# Patient Record
Sex: FEMALE | Race: WHITE | Employment: OTHER | URBAN - METROPOLITAN AREA
[De-identification: names, ages, dates, MRNs, and addresses within clinical notes are randomized per-mention and may not be internally consistent; named-entity substitution may affect disease eponyms.]

---

## 2017-01-01 ENCOUNTER — HOSPITAL ENCOUNTER (EMERGENCY)
Age: 72
Discharge: HOME | End: 2017-01-01
Attending: EMERGENCY MEDICINE
Payer: MEDICARE

## 2017-01-01 VITALS
TEMPERATURE: 97.7 F | HEIGHT: 63 IN | RESPIRATION RATE: 18 BRPM | BODY MASS INDEX: 33.66 KG/M2 | HEART RATE: 78 BPM | DIASTOLIC BLOOD PRESSURE: 70 MMHG | SYSTOLIC BLOOD PRESSURE: 195 MMHG | OXYGEN SATURATION: 94 % | WEIGHT: 190 LBS

## 2017-01-01 DIAGNOSIS — I10 ESSENTIAL HYPERTENSION: ICD-10-CM

## 2017-01-01 PROCEDURE — 74011250637 HC RX REV CODE- 250/637: Performed by: EMERGENCY MEDICINE

## 2017-01-01 PROCEDURE — 99284 EMERGENCY DEPT VISIT MOD MDM: CPT

## 2017-01-01 RX ORDER — CANDESARTAN CILEXETIL AND HYDROCHLOROTHIAZIDE 32; 12.5 MG/1; MG/1
1 TABLET ORAL DAILY
COMMUNITY
End: 2018-12-07

## 2017-01-01 RX ORDER — AMLODIPINE BESYLATE 5 MG/1
10 TABLET ORAL DAILY
COMMUNITY

## 2017-01-01 RX ORDER — CLONIDINE HYDROCHLORIDE 0.2 MG/1
0.2 TABLET ORAL
COMMUNITY

## 2017-01-01 RX ORDER — GLIMEPIRIDE 4 MG/1
4 TABLET ORAL 2 TIMES DAILY
COMMUNITY
End: 2018-12-07

## 2017-01-01 RX ORDER — CLONIDINE HYDROCHLORIDE 0.1 MG/1
0.2 TABLET ORAL
Status: COMPLETED | OUTPATIENT
Start: 2017-01-01 | End: 2017-01-01

## 2017-01-01 RX ORDER — AMLODIPINE BESYLATE 5 MG/1
5 TABLET ORAL
Status: COMPLETED | OUTPATIENT
Start: 2017-01-01 | End: 2017-01-01

## 2017-01-01 RX ADMIN — CLONIDINE HYDROCHLORIDE 0.2 MG: 0.1 TABLET ORAL at 07:40

## 2017-01-01 RX ADMIN — AMLODIPINE BESYLATE 5 MG: 5 TABLET ORAL at 07:40

## 2017-01-01 NOTE — DISCHARGE INSTRUCTIONS
Learning About High Blood Pressure  What is high blood pressure? Blood pressure is a measure of how hard the blood pushes against the walls of your arteries. It's normal for blood pressure to go up and down throughout the day, but if it stays up, you have high blood pressure. Another name for high blood pressure is hypertension. Two numbers tell you your blood pressure. The first number is the systolic pressure. It shows how hard the blood pushes when your heart is pumping. The second number is the diastolic pressure. It shows how hard the blood pushes between heartbeats, when your heart is relaxed and filling with blood. A blood pressure of less than 120/80 (say \"120 over 80\") is ideal for an adult. High blood pressure is 140/90 or higher. You have high blood pressure if your top number is 140 or higher or your bottom number is 90 or higher, or both. Many people fall into the category in between, called prehypertension. People with prehypertension need to make lifestyle changes to bring their blood pressure down and help prevent or delay high blood pressure. What happens when you have high blood pressure? · Blood flows through your arteries with too much force. Over time, this damages the walls of your arteries. But you can't feel it. High blood pressure usually doesn't cause symptoms. · Fat and calcium start to build up in your arteries. This buildup is called plaque. Plaque makes your arteries narrower and stiffer. Blood can't flow through them as easily. · This lack of good blood flow starts to damage some of the organs in your body. This can lead to problems such as coronary artery disease and heart attack, heart failure, stroke, kidney failure, and eye damage. How can you prevent high blood pressure? · Stay at a healthy weight. · Try to limit how much sodium you eat to less than 2,300 milligrams (mg) a day.  If you limit your sodium to 1,500 mg a day, you can lower your blood pressure even more.  ¨ Buy foods that are labeled \"unsalted,\" \"sodium-free,\" or \"low-sodium. \" Foods labeled \"reduced-sodium\" and \"light sodium\" may still have too much sodium. ¨ Flavor your food with garlic, lemon juice, onion, vinegar, herbs, and spices instead of salt. Do not use soy sauce, steak sauce, onion salt, garlic salt, mustard, or ketchup on your food. ¨ Use less salt (or none) when recipes call for it. You can often use half the salt a recipe calls for without losing flavor. · Be physically active. Get at least 30 minutes of exercise on most days of the week. Walking is a good choice. You also may want to do other activities, such as running, swimming, cycling, or playing tennis or team sports. · Limit alcohol to 2 drinks a day for men and 1 drink a day for women. · Eat plenty of fruits, vegetables, and low-fat dairy products. Eat less saturated and total fats. How is high blood pressure treated? · Your doctor will suggest making lifestyle changes. For example, your doctor may ask you to eat healthy foods, quit smoking, lose extra weight, and be more active. · If lifestyle changes don't help enough or your blood pressure is very high, you will have to take medicine every day. Follow-up care is a key part of your treatment and safety. Be sure to make and go to all appointments, and call your doctor if you are having problems. It's also a good idea to know your test results and keep a list of the medicines you take. Where can you learn more? Go to http://taylor-emanuel.info/. Enter P501 in the search box to learn more about \"Learning About High Blood Pressure. \"  Current as of: March 23, 2016  Content Version: 11.1  © 7076-0957 [employer]. Care instructions adapted under license by [employer] (which disclaims liability or warranty for this information).  If you have questions about a medical condition or this instruction, always ask your healthcare professional. [place], [facility] disclaims any warranty or liability for your use of this information. Low Sodium Diet (2,000 Milligram): Care Instructions  Your Care Instructions  Too much sodium causes your body to hold on to extra water. This can raise your blood pressure and force your heart and kidneys to work harder. In very serious cases, this could cause you to be put in the hospital. It might even be life-threatening. By limiting sodium, you will feel better and lower your risk of serious problems. The most common source of sodium is salt. People get most of the salt in their diet from canned, prepared, and packaged foods. Fast food and restaurant meals also are very high in sodium. Your doctor will probably limit your sodium to less than 2,000 milligrams (mg) a day. This limit counts all the sodium in prepared and packaged foods and any salt you add to your food. Follow-up care is a key part of your treatment and safety. Be sure to make and go to all appointments, and call your doctor if you are having problems. It's also a good idea to know your test results and keep a list of the medicines you take. How can you care for yourself at home? Read food labels  · Read labels on cans and food packages. The labels tell you how much sodium is in each serving. Make sure that you look at the serving size. If you eat more than the serving size, you have eaten more sodium. · Food labels also tell you the Percent Daily Value for sodium. Choose products with low Percent Daily Values for sodium. · Be aware that sodium can come in forms other than salt, including monosodium glutamate (MSG), sodium citrate, and sodium bicarbonate (baking soda). MSG is often added to Asian food. When you eat out, you can sometimes ask for food without MSG or added salt.   Buy low-sodium foods  · Buy foods that are labeled \"unsalted\" (no salt added), \"sodium-free\" (less than 5 mg of sodium per serving), or \"low-sodium\" (less than 140 mg of sodium per serving). Foods labeled \"reduced-sodium\" and \"light sodium\" may still have too much sodium. Be sure to read the label to see how much sodium you are getting. · Buy fresh vegetables, or frozen vegetables without added sauces. Buy low-sodium versions of canned vegetables, soups, and other canned goods. Prepare low-sodium meals  · Cut back on the amount of salt you use in cooking. This will help you adjust to the taste. Do not add salt after cooking. One teaspoon of salt has about 2,300 mg of sodium. · Take the salt shaker off the table. · Flavor your food with garlic, lemon juice, onion, vinegar, herbs, and spices. Do not use soy sauce, lite soy sauce, steak sauce, onion salt, garlic salt, celery salt, mustard, or ketchup on your food. · Use low-sodium salad dressings, sauces, and ketchup. Or make your own salad dressings and sauces without adding salt. · Use less salt (or none) when recipes call for it. You can often use half the salt a recipe calls for without losing flavor. Other foods such as rice, pasta, and grains do not need added salt. · Rinse canned vegetables, and cook them in fresh water. This removes some--but not all--of the salt. · Avoid water that is naturally high in sodium or that has been treated with water softeners, which add sodium. Call your local water company to find out the sodium content of your water supply. If you buy bottled water, read the label and choose a sodium-free brand. Avoid high-sodium foods  · Avoid eating:  ¨ Smoked, cured, salted, and canned meat, fish, and poultry. ¨ Ham, ellis, hot dogs, and luncheon meats. ¨ Regular, hard, and processed cheese and regular peanut butter. ¨ Crackers with salted tops, and other salted snack foods such as pretzels, chips, and salted popcorn. ¨ Frozen prepared meals, unless labeled low-sodium. ¨ Canned and dried soups, broths, and bouillon, unless labeled sodium-free or low-sodium.   ¨ Canned vegetables, unless labeled sodium-free or low-sodium. ¨ Western [place]icki fries, pizza, tacos, and other fast foods. ¨ Pickles, olives, ketchup, and other condiments, especially soy sauce, unless labeled sodium-free or low-sodium. Where can you learn more? Go to http://taylor-emanuel.info/. Enter O479 in the search box to learn more about \"Low Sodium Diet (2,000 Milligram): Care Instructions. \"  Current as of: July 26, 2016  Content Version: 11.1  © 8235-3351 [employer]. Care instructions adapted under license by [employer] (which disclaims liability or warranty for this information). If you have questions about a medical condition or this instruction, always ask your healthcare professional. [address] any warranty or liability for your use of this information.

## 2017-01-01 NOTE — ED PROVIDER NOTES
HPI Comments: 7:12 AM. Kathleen Haro is a 66 y.o. female with a history of DM, HTN, colon polyps, fatty liver, breast calcifications, and chronic kidney disease who presents to the ED c/o hypotension, after checking her BP monitor this morning so didn't take her usual BP meds. The patient mentions that she only took Atenolol yesterday, instead of all her usual BP medications. The patient notes that she normally also takes Atacand one a day, 0.2 mg Clonidine twice a day, and 5 mg Norvasc once a day. The patient denies vomiting or dizziness. There are no other concerns at this time. PCP: Malika Damico MD    The history is provided by the patient. Past Medical History:   Diagnosis Date    Breast calcifications      Left breast     Chronic kidney disease     Colon polyps     Diabetes ([facility] [address]     Fatty liver     Hypertension        Past Surgical History:   Procedure Laterality Date    Hx appendectomy      Endoscopy, colon, diagnostic      Hx hysterectomy  1990    Hx cholecystectomy      Hx breast biopsy Left 9/3/2010     Left breast    Hx breast biopsy       Liver    Hx tonsillectomy           Family History:   Problem Relation Age of Onset    Cancer Sister 71     breast cancer    Breast Cancer Sister 79    Hypertension Mother     Stroke Mother 59    Diabetes Other      grandmother    Colon Cancer Father 67       Social History     Social History    Marital status:      Spouse name: N/A    Number of children: N/A    Years of education: N/A     Occupational History    Not on file. Social History Main Topics    Smoking status: Former Smoker    Smokeless tobacco: Never Used    Alcohol use No    Drug use: No    Sexual activity: Not on file     Other Topics Concern    Not on file     Social History Narrative         ALLERGIES: Ciprofloxacin; Codeine; Penicillins; and Sulfa (sulfonamide antibiotics)    Review of Systems   Constitutional: Negative for fever.    HENT: Negative for sore throat. Eyes: Negative for redness. Respiratory: Negative for shortness of breath and wheezing. Cardiovascular:        Positive for hypotension   Gastrointestinal: Negative for abdominal pain and nausea. Genitourinary: Negative for dysuria. Musculoskeletal: Negative for neck stiffness. Skin: Negative for pallor. Neurological: Negative for headaches. Hematological: Does not bruise/bleed easily. All other systems reviewed and are negative. Vitals:    01/01/17 0630 01/01/17 0700 01/01/17 0730 01/01/17 0815   BP: (!) 202/69 191/62 (!) 212/75 195/70   Pulse:       Resp:       Temp:       SpO2: 94% 94% 93% 94%   Weight:       Height:                Physical Exam   Constitutional: She is oriented to person, place, and time. She appears well-developed and well-nourished. No distress. HENT:   Head: Normocephalic and atraumatic. Mouth/Throat: Oropharynx is clear and moist.   Eyes: Conjunctivae are normal. Pupils are equal, round, and reactive to light. No scleral icterus. Extraocular muscles intact   Neck: Normal range of motion. Neck supple. Cardiovascular: Intact distal pulses. Capillary refill < 3 seconds   Pulmonary/Chest: Effort normal and breath sounds normal. No respiratory distress. She has no wheezes. Abdominal: Soft. Bowel sounds are normal. She exhibits no distension. There is no tenderness. Musculoskeletal: Normal range of motion. She exhibits no edema. Lymphadenopathy:     She has no cervical adenopathy. Neurological: She is alert and oriented to person, place, and time. No cranial nerve deficit. Strength 5/5  No cerebral ataxia   Skin: Skin is warm and dry. She is not diaphoretic. Psychiatric:   No slurred speech   Nursing note and vitals reviewed. MDM  Number of Diagnoses or Management Options  Elevated blood pressure:   Essential hypertension:   Diagnosis management comments: Pt states her bp appeared low at home on he bp cuff.  However, in ED her bp is actually elevated. No ha, dizziness, or blurred vision. Will give her dose catapres, and amlodopine which she didn't take these or her metoprolol. Repeat bp is improving. Will dc home with violeta Pt to take her bp meds as precribed. Has no HA or dizziness. Amount and/or Complexity of Data Reviewed  Tests in the medicine section of CPT®: ordered and reviewed    Risk of Complications, Morbidity, and/or Mortality  Presenting problems: moderate    Patient Progress  Patient progress: improved    ED Course       Procedures    Vitals:  Patient Vitals for the past 12 hrs:   Temp Pulse Resp BP SpO2   01/01/17 0630 - - - (!) 202/69 94 %   01/01/17 0621 - - - (!) 209/87 -   01/01/17 0600 - - - 200/76 94 %   01/01/17 0545 - - - (!) 219/78 95 %   01/01/17 0540 - - - (!) 223/76 96 %   01/01/17 0532 97.7 °F (36.5 °C) 78 18 (!) 215/84 97 %   01/01/17 0527 - - - - 97 %   01/01/17 0524 - - - (!) 215/84 -     97% on RA, indicating adequate oxygenation. EKG interpretation by ED Physician:      Disposition:  Diagnosis:   1. Elevated blood pressure    2. Essential hypertension        Disposition: discharged    Follow-up Information     Follow up With Details Comments Contact Info    Edd Powell MD Call in 2 days  [address] EMERGENCY DEPT  As needed, If symptoms worsen [facility]. [phone]            Patient's Medications   Start Taking    No medications on file   Continue Taking    ALBUTEROL (PROVENTIL HFA) 90 MCG/ACTUATION INHALER    Take  by inhalation. AMLODIPINE (NORVASC) 5 MG TABLET    Take 5 mg by mouth daily. ASPIRIN 81 MG TABLET    Take 81 mg by mouth. ATENOLOL (TENORMIN) 100 MG TABLET    Take 100 mg by mouth daily. ATORVASTATIN (LIPITOR) 20 MG TABLET    Take  by mouth daily. CANDESARTAN-HYDROCHLOROTHIAZIDE (ATACAND HCT) 32-12.5 MG PER TABLET    Take 1 Tab by mouth daily. CANDESARTAN-HYDROCHLOROTHIAZIDE (ATACAND HCT) 32-12.5 MG PER TABLET    Take 1 Tab by mouth daily. CLONIDINE HCL (CATAPRES) 0.2 MG TABLET    Take 0.2 mg by mouth two (2) times a day. DIPHENHYDRAMINE HCL (BENADRYL ALLERGY PO)    Take  by mouth. GLIMEPIRIDE (AMARYL) 4 MG TABLET    Take 4 mg by mouth two (2) times a day. METFORMIN (GLUCOPHAGE) 1,000 MG TABLET    Take 1,000 mg by mouth two (2) times daily (with meals). MILK THISTLE 300 MG CAP    Take  by mouth. These Medications have changed    No medications on file   Stop Taking    No medications on file     Scribe Attestation:   I, Erin Latham, am scribing for and in the presence of Cynthia Langley,  January 01, 2017 at 6:41 AM     Signed by: Poonam Espana, 1/1/17, 6:41 AM    Physician Attestation:   I personally performed the services described in this documentation, reviewed and edited the documentation which was dictated to the scribe in my presence, and it accurately records my words and actions.  Cynthia Langley,   January 01, 2017 at 6:41 AM

## 2017-01-01 NOTE — ED NOTES
Patient states he home monitor gave BP readings of 130/? All day yesterday and this AM. Reports only taking Atenolol yesterday. Concerned about low blood pressure. Denies SOB, denies chest pain. States she has pain in her shoulders which is usual for her.

## 2017-01-24 ENCOUNTER — HOSPITAL ENCOUNTER (EMERGENCY)
Age: 72
Discharge: HOME | End: 2017-01-24
Attending: EMERGENCY MEDICINE
Payer: MEDICARE

## 2017-01-24 VITALS
BODY MASS INDEX: 34.72 KG/M2 | RESPIRATION RATE: 16 BRPM | DIASTOLIC BLOOD PRESSURE: 45 MMHG | SYSTOLIC BLOOD PRESSURE: 146 MMHG | WEIGHT: 196 LBS | OXYGEN SATURATION: 96 % | HEART RATE: 64 BPM | TEMPERATURE: 98 F

## 2017-01-24 LAB
ANION GAP BLD CALC-SCNC: 7 MMOL/L (ref 3–18)
APPEARANCE UR: CLEAR
BASOPHILS # BLD AUTO: 0 K/UL (ref 0–0.06)
BASOPHILS # BLD: 0 % (ref 0–2)
BILIRUB UR QL: NEGATIVE
BUN SERPL-MCNC: 17 MG/DL (ref 7–18)
BUN/CREAT SERPL: 27 (ref 12–20)
CALCIUM SERPL-MCNC: 9.2 MG/DL (ref 8.5–10.1)
CHLORIDE SERPL-SCNC: 100 MMOL/L (ref 100–108)
CK MB CFR SERPL CALC: 2 % (ref 0–4)
CK MB SERPL-MCNC: 0.5 NG/ML (ref 0.5–3.6)
CK SERPL-CCNC: 25 U/L (ref 26–192)
CO2 SERPL-SCNC: 32 MMOL/L (ref 21–32)
COLOR UR: YELLOW
CREAT SERPL-MCNC: 0.63 MG/DL (ref 0.6–1.3)
DIFFERENTIAL METHOD BLD: ABNORMAL
EOSINOPHIL # BLD: 0.2 K/UL (ref 0–0.4)
EOSINOPHIL NFR BLD: 2 % (ref 0–5)
ERYTHROCYTE [DISTWIDTH] IN BLOOD BY AUTOMATED COUNT: 15 % (ref 11.6–14.5)
GLUCOSE SERPL-MCNC: 157 MG/DL (ref 74–99)
GLUCOSE UR STRIP.AUTO-MCNC: NEGATIVE MG/DL
HCT VFR BLD AUTO: 37.5 % (ref 35–45)
HGB BLD-MCNC: 12.5 G/DL (ref 12–16)
HGB UR QL STRIP: NEGATIVE
KETONES UR QL STRIP.AUTO: NEGATIVE MG/DL
LEUKOCYTE ESTERASE UR QL STRIP.AUTO: NEGATIVE
LYMPHOCYTES # BLD AUTO: 17 % (ref 21–52)
LYMPHOCYTES # BLD: 1.3 K/UL (ref 0.9–3.6)
MCH RBC QN AUTO: 26.9 PG (ref 24–34)
MCHC RBC AUTO-ENTMCNC: 33.3 G/DL (ref 31–37)
MCV RBC AUTO: 80.8 FL (ref 74–97)
MONOCYTES # BLD: 0.4 K/UL (ref 0.05–1.2)
MONOCYTES NFR BLD AUTO: 6 % (ref 3–10)
NEUTS SEG # BLD: 5.6 K/UL (ref 1.8–8)
NEUTS SEG NFR BLD AUTO: 75 % (ref 40–73)
NITRITE UR QL STRIP.AUTO: NEGATIVE
PH UR STRIP: 7.5 [PH] (ref 5–8)
PLATELET # BLD AUTO: 225 K/UL (ref 135–420)
PMV BLD AUTO: 10.9 FL (ref 9.2–11.8)
POTASSIUM SERPL-SCNC: 3.9 MMOL/L (ref 3.5–5.5)
PROT UR STRIP-MCNC: NEGATIVE MG/DL
RBC # BLD AUTO: 4.64 M/UL (ref 4.2–5.3)
SODIUM SERPL-SCNC: 139 MMOL/L (ref 136–145)
SP GR UR REFRACTOMETRY: 1.01 (ref 1–1.03)
TROPONIN I SERPL-MCNC: <0.02 NG/ML (ref 0–0.04)
UROBILINOGEN UR QL STRIP.AUTO: 0.2 EU/DL (ref 0.2–1)
WBC # BLD AUTO: 7.6 K/UL (ref 4.6–13.2)

## 2017-01-24 PROCEDURE — 82550 ASSAY OF CK (CPK): CPT | Performed by: EMERGENCY MEDICINE

## 2017-01-24 PROCEDURE — 85025 COMPLETE CBC W/AUTO DIFF WBC: CPT | Performed by: EMERGENCY MEDICINE

## 2017-01-24 PROCEDURE — 80048 BASIC METABOLIC PNL TOTAL CA: CPT | Performed by: EMERGENCY MEDICINE

## 2017-01-24 PROCEDURE — 99284 EMERGENCY DEPT VISIT MOD MDM: CPT

## 2017-01-24 PROCEDURE — 81003 URINALYSIS AUTO W/O SCOPE: CPT | Performed by: EMERGENCY MEDICINE

## 2017-01-24 NOTE — ED TRIAGE NOTES
Seen at [facility] and urgent care over last few days for not feeling well due to elevated BP.  Denies headache

## 2017-01-24 NOTE — ED PROVIDER NOTES
HPI Comments: 1:29 PM Jackie Green is a 66 y.o. female with h/o DM, HTN, and CKD who presents to ED for evaluation of elevated blood pressure. Pt has been to [facility] and [facility]gent Care within the last 2 days about her high blood pressure. Pt was given Labetalol and discharged from [facility]. Her Norvasc dosage was increased by her Urgent Care provider. Pt has been taking her medications yesterday and this morning as prescribed. Pt took her Norvasc and Clonidine 0.2 mg. She says her blood pressure went up to 228/111. Pt was advised to come to the ED by Urgent Care. Pt denies CP, SOB, vomiting, and HA. She states she \"feels sluggish. \" No other concerns or symptoms at this time. PCP: Warner Nelson MD    The history is provided by the patient. Past Medical History:   Diagnosis Date    Breast calcifications      Left breast     Chronic kidney disease     Colon polyps     Diabetes ([facility] [address]     Fatty liver     Hypertension        Past Surgical History:   Procedure Laterality Date    Hx appendectomy      Endoscopy, colon, diagnostic      Hx hysterectomy  1990    Hx cholecystectomy      Hx breast biopsy Left 9/3/2010     Left breast    Hx breast biopsy       Liver    Hx tonsillectomy           Family History:   Problem Relation Age of Onset    Cancer Sister 71     breast cancer    Breast Cancer Sister 79    Hypertension Mother     Stroke Mother 59    Diabetes Other      grandmother    Colon Cancer Father 67       Social History     Social History    Marital status:      Spouse name: N/A    Number of children: N/A    Years of education: N/A     Occupational History    Not on file.      Social History Main Topics    Smoking status: Former Smoker    Smokeless tobacco: Never Used    Alcohol use No    Drug use: No    Sexual activity: Not on file     Other Topics Concern    Not on file     Social History Narrative         ALLERGIES: Ciprofloxacin; Codeine; Penicillins; and Sulfa (sulfonamide antibiotics)    Review of Systems   Constitutional: Positive for fatigue (\"sluggish\"). Negative for chills and fever. HENT: Negative for congestion, rhinorrhea and sore throat. Respiratory: Negative for cough and shortness of breath. Cardiovascular: Negative for chest pain and leg swelling. Gastrointestinal: Negative for abdominal pain, diarrhea, nausea and vomiting. Genitourinary: Negative for dysuria and frequency. Musculoskeletal: Negative for arthralgias, back pain and myalgias. Skin: Negative for rash and wound. Neurological: Negative for dizziness, numbness and headaches. Vitals:    01/24/17 1254 01/24/17 1421   BP: (!) 201/89 159/49   Pulse: 66 62   Resp: 17 14   Temp: 98 °F (36.7 °C)    SpO2: 100% 96%   Weight: 88.9 kg (196 lb)             Physical Exam   Constitutional: She is oriented to person, place, and time. She appears well-developed and well-nourished. No distress. HENT:   Head: Normocephalic and atraumatic. Mouth/Throat: Oropharynx is clear and moist.   Eyes: Conjunctivae and EOM are normal. Pupils are equal, round, and reactive to light. No scleral icterus. Neck: Normal range of motion. Neck supple. Cardiovascular: Normal rate, regular rhythm and normal heart sounds. No murmur heard. Pulmonary/Chest: Effort normal and breath sounds normal. No respiratory distress. Abdominal: Soft. Bowel sounds are normal. She exhibits no distension. There is no tenderness. Musculoskeletal: She exhibits no edema. Lymphadenopathy:     She has no cervical adenopathy. Neurological: She is alert and oriented to person, place, and time. Coordination normal.   Skin: Skin is warm and dry. No rash noted. Psychiatric: She has a normal mood and affect. Her behavior is normal.   Nursing note and vitals reviewed.        MDM  Number of Diagnoses or Management Options  Elevated blood pressure:   Diagnosis management comments: H/o elevated blood pressure seen X 2 in the last 3 days Norvasc increased to 10mg yesterday now improved Bp    Now improved will dc home         Amount and/or Complexity of Data Reviewed  Clinical lab tests: ordered and reviewed      ED Course       Procedures    Vitals:  Patient Vitals for the past 12 hrs:   Temp Pulse Resp BP SpO2   01/24/17 1421 - 62 14 159/49 96 %   01/24/17 1254 98 °F (36.7 °C) 66 17 (!) 201/89 100 %   100% on RA, indicating adequate oxygenation.       Medications ordered:   Medications - No data to display      Lab findings:  Recent Results (from the past 12 hour(s))   URINALYSIS W/ RFLX MICROSCOPIC    Collection Time: 01/24/17  2:08 PM   Result Value Ref Range    Color YELLOW      Appearance CLEAR      Specific gravity 1.014 1.005 - 1.030      pH (UA) 7.5 5.0 - 8.0      Protein NEGATIVE  NEG mg/dL    Glucose NEGATIVE  NEG mg/dL    Ketone NEGATIVE  NEG mg/dL    Bilirubin NEGATIVE  NEG      Blood NEGATIVE  NEG      Urobilinogen 0.2 0.2 - 1.0 EU/dL    Nitrites NEGATIVE  NEG      Leukocyte Esterase NEGATIVE  NEG     METABOLIC PANEL, BASIC    Collection Time: 01/24/17  2:30 PM   Result Value Ref Range    Sodium 139 136 - 145 mmol/L    Potassium 3.9 3.5 - 5.5 mmol/L    Chloride 100 100 - 108 mmol/L    CO2 32 21 - 32 mmol/L    Anion gap 7 3.0 - 18 mmol/L    Glucose 157 (H) 74 - 99 mg/dL    BUN 17 7.0 - 18 MG/DL    Creatinine 0.63 0.6 - 1.3 MG/DL    BUN/Creatinine ratio 27 (H) 12 - 20      GFR est AA >60 >60 ml/min/1.73m2    GFR est non-AA >60 >60 ml/min/1.73m2    Calcium 9.2 8.5 - 10.1 MG/DL   CBC WITH AUTOMATED DIFF    Collection Time: 01/24/17  2:31 PM   Result Value Ref Range    WBC 7.6 4.6 - 13.2 K/uL    RBC 4.64 4.20 - 5.30 M/uL    HGB 12.5 12.0 - 16.0 g/dL    HCT 37.5 35.0 - 45.0 %    MCV 80.8 74.0 - 97.0 FL    MCH 26.9 24.0 - 34.0 PG    MCHC 33.3 31.0 - 37.0 g/dL    RDW 15.0 (H) 11.6 - 14.5 %    PLATELET 095 488 - 767 K/uL    MPV 10.9 9.2 - 11.8 FL    NEUTROPHILS 75 (H) 40 - 73 %    LYMPHOCYTES 17 (L) 21 - 52 %    MONOCYTES 6 3 - 10 % EOSINOPHILS 2 0 - 5 %    BASOPHILS 0 0 - 2 %    ABS. NEUTROPHILS 5.6 1.8 - 8.0 K/UL    ABS. LYMPHOCYTES 1.3 0.9 - 3.6 K/UL    ABS. MONOCYTES 0.4 0.05 - 1.2 K/UL    ABS. EOSINOPHILS 0.2 0.0 - 0.4 K/UL    ABS. BASOPHILS 0.0 0.0 - 0.06 K/UL    DF AUTOMATED     CARDIAC PANEL,(CK, CKMB & TROPONIN)    Collection Time: 01/24/17  2:31 PM   Result Value Ref Range    CK 25 (L) 26 - 192 U/L    CK - MB 0.5 0.5 - 3.6 ng/ml    CK-MB Index 2.0 0.0 - 4.0 %    Troponin-I, Qt. <0.02 0.0 - 0.045 NG/ML         Orders:  Orders Placed This Encounter    METABOLIC PANEL, BASIC     Standing Status:   Standing     Number of Occurrences:   1    URINALYSIS W/ RFLX MICROSCOPIC     Standing Status:   Standing     Number of Occurrences:   1    CBC WITH AUTOMATED DIFF     Standing Status:   Standing     Number of Occurrences:   1    CARDIAC PANEL,(CK, CKMB & TROPONIN)     Standing Status:   Standing     Number of Occurrences:   1       Progress notes, Consult notes, or additional Procedure notes:       Disposition:  Diagnosis:   1. Elevated blood pressure        Disposition:     Follow-up Information     Follow up With Details Comments Contact [facility]  As needed, If symptoms worsen [address] [facility]    Emely Mccoy MD Schedule an appointment as soon as possible for a visit for ED follow up appointment [facility]  [phone]             Patient's Medications   Start Taking    No medications on file   Continue Taking    ALBUTEROL (PROVENTIL HFA) 90 MCG/ACTUATION INHALER    Take  by inhalation. AMLODIPINE (NORVASC) 5 MG TABLET    Take 10 mg by mouth daily. ASPIRIN 81 MG TABLET    Take 81 mg by mouth. ATENOLOL (TENORMIN) 100 MG TABLET    Take 100 mg by mouth daily. ATORVASTATIN (LIPITOR) 20 MG TABLET    Take  by mouth daily. CANDESARTAN-HYDROCHLOROTHIAZIDE (ATACAND HCT) 32-12.5 MG PER TABLET    Take 1 Tab by mouth daily. CANDESARTAN-HYDROCHLOROTHIAZIDE (ATACAND HCT) 32-12.5 MG PER TABLET    Take 1 Tab by mouth daily. CLONIDINE HCL (CATAPRES) 0.2 MG TABLET    Take 0.2 mg by mouth two (2) times a day. DIPHENHYDRAMINE HCL (BENADRYL ALLERGY PO)    Take  by mouth. GLIMEPIRIDE (AMARYL) 4 MG TABLET    Take 4 mg by mouth two (2) times a day. METFORMIN (GLUCOPHAGE) 1,000 MG TABLET    Take 1,000 mg by mouth two (2) times daily (with meals). MILK THISTLE 300 MG CAP    Take  by mouth. These Medications have changed    No medications on file   Stop Taking    No medications on file       [id number] [facility] and in the presence of Jayde Bishop MD (01/24/17)      Physician Attestation  I personally performed the services described in this documentation, reviewed and edited the documentation which was dictated to the scribe in my presence, and it accurately records my words and actions.     Jayde Bishop MD (01/24/17)      Signed by: Rhodia Habermann, Scribe, 01/24/17, 1:33 PM

## 2017-01-24 NOTE — DISCHARGE INSTRUCTIONS
Acute High Blood Pressure: Care Instructions  Your Care Instructions  Acute high blood pressure is very high blood pressure. It's a serious problem. Very high blood pressure can damage your brain, heart, eyes, and kidneys. You may have been given medicines to lower your blood pressure. You may have gotten them as pills or through a needle in one of your veins. This is called an IV. And maybe you were given other medicines too. These can be needed when high blood pressure causes other problems. To keep your blood pressure at a lower level, you may need to make healthy lifestyle changes. And you will probably need to take medicines. Be sure to follow up with your doctor about your blood pressure and what you can do about it. Follow-up care is a key part of your treatment and safety. Be sure to make and go to all appointments, and call your doctor if you are having problems. It's also a good idea to know your test results and keep a list of the medicines you take. How can you care for yourself at home? · See your doctor as often as he or she recommends. This is to make sure your blood pressure is under control. You will probably go at least 2 times a year. But it may be more often at first.  · Take your blood pressure medicine exactly as prescribed. You may take one or more types. They include diuretics, beta-blockers, ACE inhibitors, calcium channel blockers, and angiotensin II receptor blockers. Call your doctor if you think you are having a problem with your medicine. · If you take blood pressure medicine, talk to your doctor before you take decongestants or anti-inflammatory medicine, such as ibuprofen. These can raise blood pressure. · Learn how to check your blood pressure at home. Check it often. · Ask your doctor if it's okay to drink alcohol. · Talk to your doctor about lifestyle changes that can help blood pressure. These include being active and not smoking.   When should you call for help?  Call 911 anytime you think you may need emergency care. This may mean having symptoms that suggest that your blood pressure is causing a serious heart or blood vessel problem. Your blood pressure may be over 180/110. For example, call 911 if:  · You have symptoms of a heart attack. These may include:  ¨ Chest pain or pressure, or a strange feeling in the chest.  ¨ Sweating. ¨ Shortness of breath. ¨ Nausea or vomiting. ¨ Pain, pressure, or a strange feeling in the back, neck, jaw, or upper belly or in one or both shoulders or arms. ¨ Lightheadedness or sudden weakness. ¨ A fast or irregular heartbeat. · You have symptoms of a stroke. These may include:  ¨ Sudden numbness, tingling, weakness, or loss of movement in your face, arm, or leg, especially on only one side of your body. ¨ Sudden vision changes. ¨ Sudden trouble speaking. ¨ Sudden confusion or trouble understanding simple statements. ¨ Sudden problems with walking or balance. ¨ A sudden, severe headache that is different from past headaches. · You have severe back or belly pain. Do not wait until your blood pressure comes down on its own. Get help right away. Call your doctor now or seek immediate care if:  · Your blood pressure is much higher than normal (such as 180/110 or higher), but you don't have symptoms. · You think high blood pressure is causing symptoms, such as:  ¨ Severe headache. ¨ Blurry vision. Watch closely for changes in your health, and be sure to contact your doctor if:  · Your blood pressure measures 140/90 or higher at least 2 times. That means the top number is 140 or higher or the bottom number is 90 or higher, or both. · You think you may be having side effects from your blood pressure medicine. · Your blood pressure is usually normal, but it goes above normal at least 2 times. Where can you learn more? Go to http://taylor-emanuel.info/.   Enter E346 in the search box to learn more about \"Acute High Blood Pressure: Care Instructions. \"  Current as of: August 8, 2016  Content Version: 11.1  © 2881-0883 [employer]. Care instructions adapted under license by [employer] (which disclaims liability or warranty for this information). If you have questions about a medical condition or this instruction, always ask your healthcare professional. [address] any warranty or liability for your use of this information.

## 2017-01-31 ENCOUNTER — HOSPITAL ENCOUNTER (EMERGENCY)
Age: 72
Discharge: HOME | End: 2017-01-31
Attending: EMERGENCY MEDICINE
Payer: MEDICARE

## 2017-01-31 ENCOUNTER — APPOINTMENT (OUTPATIENT)
Dept: GENERAL RADIOLOGY | Age: 72
End: 2017-01-31
Attending: EMERGENCY MEDICINE
Payer: MEDICARE

## 2017-01-31 VITALS
RESPIRATION RATE: 16 BRPM | DIASTOLIC BLOOD PRESSURE: 62 MMHG | HEIGHT: 63 IN | SYSTOLIC BLOOD PRESSURE: 137 MMHG | WEIGHT: 190 LBS | OXYGEN SATURATION: 96 % | BODY MASS INDEX: 33.66 KG/M2 | HEART RATE: 65 BPM | TEMPERATURE: 98 F

## 2017-01-31 DIAGNOSIS — I10 ESSENTIAL HYPERTENSION: Primary | ICD-10-CM

## 2017-01-31 LAB
ANION GAP BLD CALC-SCNC: 22 MMOL/L (ref 10–20)
BUN BLD-MCNC: 18 MG/DL (ref 7–18)
CA-I BLD-MCNC: 1.15 MMOL/L (ref 1.12–1.32)
CHLORIDE BLD-SCNC: 92 MMOL/L (ref 100–108)
CO2 BLD-SCNC: 26 MMOL/L (ref 19–24)
CREAT UR-MCNC: 0.7 MG/DL (ref 0.6–1.3)
GLUCOSE BLD STRIP.AUTO-MCNC: 172 MG/DL (ref 74–106)
HCT VFR BLD CALC: 42 % (ref 36–49)
HGB BLD-MCNC: 14.3 G/DL (ref 12–16)
POTASSIUM BLD-SCNC: 3.5 MMOL/L (ref 3.5–5.5)
SODIUM BLD-SCNC: 136 MMOL/L (ref 136–145)
TROPONIN I BLD-MCNC: <0.04 NG/ML (ref 0–0.08)

## 2017-01-31 PROCEDURE — 71010 XR CHEST PORT: CPT

## 2017-01-31 PROCEDURE — 84484 ASSAY OF TROPONIN QUANT: CPT

## 2017-01-31 PROCEDURE — 99285 EMERGENCY DEPT VISIT HI MDM: CPT

## 2017-01-31 PROCEDURE — 80047 BASIC METABLC PNL IONIZED CA: CPT

## 2017-01-31 PROCEDURE — 93005 ELECTROCARDIOGRAM TRACING: CPT

## 2017-01-31 RX ORDER — LORAZEPAM 1 MG/1
1 TABLET ORAL
Status: DISCONTINUED | OUTPATIENT
Start: 2017-01-31 | End: 2017-01-31

## 2017-01-31 NOTE — DISCHARGE INSTRUCTIONS
Learning About High Blood Pressure  What is high blood pressure? Blood pressure is a measure of how hard the blood pushes against the walls of your arteries. It's normal for blood pressure to go up and down throughout the day, but if it stays up, you have high blood pressure. Another name for high blood pressure is hypertension. Two numbers tell you your blood pressure. The first number is the systolic pressure. It shows how hard the blood pushes when your heart is pumping. The second number is the diastolic pressure. It shows how hard the blood pushes between heartbeats, when your heart is relaxed and filling with blood. A blood pressure of less than 120/80 (say \"120 over 80\") is ideal for an adult. High blood pressure is 140/90 or higher. You have high blood pressure if your top number is 140 or higher or your bottom number is 90 or higher, or both. Many people fall into the category in between, called prehypertension. People with prehypertension need to make lifestyle changes to bring their blood pressure down and help prevent or delay high blood pressure. What happens when you have high blood pressure? · Blood flows through your arteries with too much force. Over time, this damages the walls of your arteries. But you can't feel it. High blood pressure usually doesn't cause symptoms. · Fat and calcium start to build up in your arteries. This buildup is called plaque. Plaque makes your arteries narrower and stiffer. Blood can't flow through them as easily. · This lack of good blood flow starts to damage some of the organs in your body. This can lead to problems such as coronary artery disease and heart attack, heart failure, stroke, kidney failure, and eye damage. How can you prevent high blood pressure? · Stay at a healthy weight. · Try to limit how much sodium you eat to less than 2,300 milligrams (mg) a day.  If you limit your sodium to 1,500 mg a day, you can lower your blood pressure even more.  ¨ Buy foods that are labeled \"unsalted,\" \"sodium-free,\" or \"low-sodium. \" Foods labeled \"reduced-sodium\" and \"light sodium\" may still have too much sodium. ¨ Flavor your food with garlic, lemon juice, onion, vinegar, herbs, and spices instead of salt. Do not use soy sauce, steak sauce, onion salt, garlic salt, mustard, or ketchup on your food. ¨ Use less salt (or none) when recipes call for it. You can often use half the salt a recipe calls for without losing flavor. · Be physically active. Get at least 30 minutes of exercise on most days of the week. Walking is a good choice. You also may want to do other activities, such as running, swimming, cycling, or playing tennis or team sports. · Limit alcohol to 2 drinks a day for men and 1 drink a day for women. · Eat plenty of fruits, vegetables, and low-fat dairy products. Eat less saturated and total fats. How is high blood pressure treated? · Your doctor will suggest making lifestyle changes. For example, your doctor may ask you to eat healthy foods, quit smoking, lose extra weight, and be more active. · If lifestyle changes don't help enough or your blood pressure is very high, you will have to take medicine every day. Follow-up care is a key part of your treatment and safety. Be sure to make and go to all appointments, and call your doctor if you are having problems. It's also a good idea to know your test results and keep a list of the medicines you take. Where can you learn more? Go to http://taylro-emanuel.info/. Enter P501 in the search box to learn more about \"Learning About High Blood Pressure. \"  Current as of: March 23, 2016  Content Version: 11.1  © 3250-5892 [employer]. Care instructions adapted under license by [employer] (which disclaims liability or warranty for this information).  If you have questions about a medical condition or this instruction, always ask your healthcare professional. [employer] disclaims any warranty or liability for your use of this information.

## 2017-01-31 NOTE — ED TRIAGE NOTES
Pt with dizziness that started today with chest tightness, states over last  Weeks has been having medications adjusted. Seen in er twice over 2 weeks. Took 2 clonidine before callign EMS.

## 2017-01-31 NOTE — ED PROVIDER NOTES
HPI Comments: 5:09 PM Lavern To is a 66 y.o. female with a history of HTN, DM,  who presents to the ED c/o dizziness that began earlier today. She states that for the past couple of weeks, she has been having her HTN medications adjusted and has been seen in the ED a couple of times. She states that today, she became anxious, experiencing chest tightness and dizziness, and decided to take 2 Clonidine and 2 Benadryl. She mentions that she has been having increased stress lately. There are no other concerns at this time. Patient is a 66 y.o. female presenting with hypertension and dizziness. The history is provided by the patient. Hypertension    Associated symptoms include dizziness. Dizziness          Past Medical History:   Diagnosis Date    Breast calcifications      Left breast     Chronic kidney disease     Colon polyps     Diabetes [facility] [address]     Fatty liver     Hypertension        Past Surgical History:   Procedure Laterality Date    Hx appendectomy      Endoscopy, colon, diagnostic      Hx hysterectomy  1990    Hx cholecystectomy      Hx breast biopsy Left 9/3/2010     Left breast    Hx breast biopsy       Liver    Hx tonsillectomy           Family History:   Problem Relation Age of Onset    Cancer Sister 71     breast cancer    Breast Cancer Sister 79    Hypertension Mother     Stroke Mother 59    Diabetes Other      grandmother    Colon Cancer Father 67       Social History     Social History    Marital status:      Spouse name: N/A    Number of children: N/A    Years of education: N/A     Occupational History    Not on file.      Social History Main Topics    Smoking status: Former Smoker    Smokeless tobacco: Never Used    Alcohol use No    Drug use: No    Sexual activity: Not on file     Other Topics Concern    Not on file     Social History Narrative         ALLERGIES: Ciprofloxacin; Codeine; Penicillins; and Sulfa (sulfonamide antibiotics)    Review of Systems   Constitutional: Negative. HENT: Negative. Eyes: Negative. Respiratory: Positive for chest tightness. Cardiovascular: Negative. Gastrointestinal: Negative. Endocrine: Negative. Genitourinary: Negative. Musculoskeletal: Negative. Skin: Negative. Allergic/Immunologic: Negative. Neurological: Positive for dizziness. Hematological: Negative. Psychiatric/Behavioral: The patient is nervous/anxious. All other systems reviewed and are negative. Vitals:    01/31/17 1705 01/31/17 1730   BP: 187/67 134/52   Pulse: 73 69   Resp: 12 15   Temp: 98 °F (36.7 °C)    SpO2: 100% 97%   Weight: 86.2 kg (190 lb)    Height: 5' 3\" (1.6 m)    100% on RA, indicating adequate oxygenation. Physical Exam   Constitutional: She is oriented to person, place, and time. She appears well-developed and well-nourished. HENT:   Head: Normocephalic and atraumatic. Eyes: Conjunctivae and EOM are normal. Pupils are equal, round, and reactive to light. Neck: Normal range of motion. Neck supple. Cardiovascular: Normal rate, regular rhythm, normal heart sounds and intact distal pulses. Pulmonary/Chest: Effort normal and breath sounds normal.   Abdominal: Soft. Bowel sounds are normal.   Musculoskeletal: Normal range of motion. Neurological: She is alert and oriented to person, place, and time. Skin: Skin is warm and dry. Psychiatric: She has a normal mood and affect. Her behavior is normal. Judgment and thought content normal.   Nursing note and vitals reviewed. [facility]  ED Course       Procedures      Vitals:  Patient Vitals for the past 12 hrs:   Temp Pulse Resp BP SpO2   01/31/17 1730 - 69 15 134/52 97 %   01/31/17 1705 98 °F (36.7 °C) 73 12 187/67 100 %   100% on RA, indicating adequate oxygenation.        Lab findings:  Recent Results (from the past 12 hour(s))   EKG, 12 LEAD, INITIAL    Collection Time: 01/31/17  5:11 PM   Result Value Ref Range    Ventricular Rate 74 BPM Atrial Rate 74 BPM    P-R Interval 148 ms    QRS Duration 78 ms    Q-T Interval 422 ms    QTC Calculation (Bezet) 468 ms    Calculated P Axis 28 degrees    Calculated R Axis 19 degrees    Calculated T Axis 63 degrees    Diagnosis       Normal sinus rhythm  Normal ECG  When compared with ECG of 16-NOV-2016 14:55,  ST elevation now present in Lateral leads     POC TROPONIN-I    Collection Time: 01/31/17  5:18 PM   Result Value Ref Range    Troponin-I (POC) <0.04 0.00 - 0.08 ng/mL   POC CHEM8    Collection Time: 01/31/17  5:20 PM   Result Value Ref Range    CO2 (POC) 26 (H) 19 - 24 MMOL/L    Glucose (POC) 172 (H) 74 - 106 MG/DL    BUN (POC) 18 7 - 18 MG/DL    Creatinine (POC) 0.7 0.6 - 1.3 MG/DL    GFR-AA (POC) >60 >60 ml/min/1.73m2    GFR, non-AA (POC) >60 >60 ml/min/1.73m2    Sodium (POC) 136 136 - 145 MMOL/L    Potassium (POC) 3.5 3.5 - 5.5 MMOL/L    Calcium, ionized (POC) 1.15 1.12 - 1.32 MMOL/L    Chloride (POC) 92 (L) 100 - 108 MMOL/L    Anion gap (POC) 22 (H) 10 - 20      Hematocrit (POC) 42 36 - 49 %    Hemoglobin (POC) 14.3 12 - 16 G/DL       EKG interpretation by ED Physician:  5:13 PM Normal sinus rhythm at a rate of 74 bpm. ME interval of 148 ms. QRS duration of 78 ms. QTc of 468 ms. R axis 19. X-Ray, CT or other radiology findings or impressions:  XR CHEST PORT    (Results Pending)   5:35 PM There is no acute cardiopulmonary disease noted on the patient's chest x-ray, as interpreted by myself. Progress notes, Consult notes or additional Procedure notes:   5:33 PM Blood pressure is currently 134/52. Reevaluation of patient:   5:50 PM I have reassessed the patient who will be discharged in stable condition. Patient is to return to emergency department if any new or worsening condition. Patient understands and verbalizes agreement with plan. Disposition:  Diagnosis:   1.  Essential hypertension        Disposition:Discharge    Follow-up Information     Follow up With Details Comments Contact Info    Dyana Andrea MD Schedule an appointment as soon as possible for a visit in 2 days  [address] [facility] EMERGENCY DEPT  If symptoms worsen [address]  [phone]            Patient's Medications   Start Taking    No medications on file   Continue Taking    ALBUTEROL (PROVENTIL HFA) 90 MCG/ACTUATION INHALER    Take  by inhalation. AMLODIPINE (NORVASC) 5 MG TABLET    Take 10 mg by mouth daily. ASPIRIN 81 MG TABLET    Take 81 mg by mouth. ATENOLOL (TENORMIN) 100 MG TABLET    Take 100 mg by mouth daily. ATORVASTATIN (LIPITOR) 20 MG TABLET    Take  by mouth daily. CANDESARTAN-HYDROCHLOROTHIAZIDE (ATACAND HCT) 32-12.5 MG PER TABLET    Take 1 Tab by mouth daily. CANDESARTAN-HYDROCHLOROTHIAZIDE (ATACAND HCT) 32-12.5 MG PER TABLET    Take 1 Tab by mouth daily. CLONIDINE HCL (CATAPRES) 0.2 MG TABLET    Take 0.2 mg by mouth two (2) times a day. DIPHENHYDRAMINE HCL (BENADRYL ALLERGY PO)    Take  by mouth. GLIMEPIRIDE (AMARYL) 4 MG TABLET    Take 4 mg by mouth two (2) times a day. METFORMIN (GLUCOPHAGE) 1,000 MG TABLET    Take 1,000 mg by mouth two (2) times daily (with meals). MILK THISTLE 300 MG CAP    Take  by mouth. These Medications have changed    No medications on file   Stop Taking    No medications on file    Scribe Attestation:   Isa Hutchinson acting as a scribe for and in the presence of Dr. Aimee Amador MD January 31, 2017 at 5:06 PM       Signed by: Poonam Song, January 31, 2017 at 5:50 PM       Provider Attestation:   I personally performed the services described in the documentation, reviewed the documentation, as recorded by the scribe in my presence, and it accurately and completely records my words and actions.      Reviewed and signed by:  Dr. Aimee Amador MD

## 2017-01-31 NOTE — ED NOTES
Pt discharged to home, questions answered, denies further questions, verbalized understanding of instructions. Armband removed and shredded. Pt educated regarding pain management and any pain medications prescribed, pt also educated on non pharmacologic pain management.

## 2017-02-01 LAB
ATRIAL RATE: 74 BPM
CALCULATED P AXIS, ECG09: 28 DEGREES
CALCULATED R AXIS, ECG10: 19 DEGREES
CALCULATED T AXIS, ECG11: 63 DEGREES
DIAGNOSIS, 93000: NORMAL
P-R INTERVAL, ECG05: 148 MS
Q-T INTERVAL, ECG07: 422 MS
QRS DURATION, ECG06: 78 MS
QTC CALCULATION (BEZET), ECG08: 468 MS
VENTRICULAR RATE, ECG03: 74 BPM

## 2017-04-04 ENCOUNTER — HOSPITAL ENCOUNTER (OUTPATIENT)
Dept: MAMMOGRAPHY | Age: 72
Discharge: HOME | End: 2017-04-04
Payer: MEDICARE

## 2017-04-04 DIAGNOSIS — Z12.31 VISIT FOR SCREENING MAMMOGRAM: ICD-10-CM

## 2017-04-04 PROCEDURE — 77067 SCR MAMMO BI INCL CAD: CPT

## 2017-04-04 PROCEDURE — 77063 BREAST TOMOSYNTHESIS BI: CPT

## 2017-04-04 PROCEDURE — G0202 SCR MAMMO BI INCL CAD: HCPCS

## 2018-05-23 ENCOUNTER — HOSPITAL ENCOUNTER (OUTPATIENT)
Dept: MAMMOGRAPHY | Age: 73
Discharge: HOME | End: 2018-05-23
Payer: MEDICARE

## 2018-05-23 DIAGNOSIS — Z12.31 VISIT FOR SCREENING MAMMOGRAM: ICD-10-CM

## 2018-05-23 PROCEDURE — 77063 BREAST TOMOSYNTHESIS BI: CPT

## 2018-12-02 ENCOUNTER — APPOINTMENT (OUTPATIENT)
Dept: GENERAL RADIOLOGY | Age: 73
DRG: 310 | End: 2018-12-02
Attending: NURSE PRACTITIONER
Payer: MEDICARE

## 2018-12-02 ENCOUNTER — HOSPITAL ENCOUNTER (INPATIENT)
Age: 73
LOS: 5 days | Discharge: HOME | DRG: 310 | End: 2018-12-07
Attending: EMERGENCY MEDICINE | Admitting: HOSPITALIST
Payer: MEDICARE

## 2018-12-02 DIAGNOSIS — I48.91 ATRIAL FIBRILLATION WITH RVR (HCC): ICD-10-CM

## 2018-12-02 DIAGNOSIS — I48.91 NEW ONSET A-FIB (HCC): Primary | ICD-10-CM

## 2018-12-02 DIAGNOSIS — F41.9 ANXIETY: ICD-10-CM

## 2018-12-02 PROBLEM — E11.9 DM (DIABETES MELLITUS) (HCC): Status: ACTIVE | Noted: 2018-12-02

## 2018-12-02 PROBLEM — I10 HTN (HYPERTENSION): Status: ACTIVE | Noted: 2018-12-02

## 2018-12-02 LAB
ANION GAP SERPL CALC-SCNC: 9 MMOL/L (ref 3–18)
BASOPHILS # BLD: 0 K/UL (ref 0–0.1)
BASOPHILS NFR BLD: 0 % (ref 0–2)
BUN SERPL-MCNC: 19 MG/DL (ref 7–18)
BUN/CREAT SERPL: 22 (ref 12–20)
CALCIUM SERPL-MCNC: 8.8 MG/DL (ref 8.5–10.1)
CHLORIDE SERPL-SCNC: 103 MMOL/L (ref 100–108)
CK MB CFR SERPL CALC: 2.1 % (ref 0–4)
CK MB SERPL-MCNC: 1.2 NG/ML (ref 5–25)
CK SERPL-CCNC: 56 U/L (ref 26–192)
CO2 SERPL-SCNC: 28 MMOL/L (ref 21–32)
CREAT SERPL-MCNC: 0.86 MG/DL (ref 0.6–1.3)
DIFFERENTIAL METHOD BLD: ABNORMAL
EOSINOPHIL # BLD: 0.1 K/UL (ref 0–0.4)
EOSINOPHIL NFR BLD: 1 % (ref 0–5)
ERYTHROCYTE [DISTWIDTH] IN BLOOD BY AUTOMATED COUNT: 14.8 % (ref 11.6–14.5)
EST. AVERAGE GLUCOSE BLD GHB EST-MCNC: 246 MG/DL
GLUCOSE BLD STRIP.AUTO-MCNC: 221 MG/DL (ref 70–110)
GLUCOSE SERPL-MCNC: 240 MG/DL (ref 74–99)
HBA1C MFR BLD: 10.2 % (ref 4.2–5.6)
HCT VFR BLD AUTO: 38 % (ref 35–45)
HGB BLD-MCNC: 12.2 G/DL (ref 12–16)
LYMPHOCYTES # BLD: 3 K/UL (ref 0.9–3.6)
LYMPHOCYTES NFR BLD: 34 % (ref 21–52)
MCH RBC QN AUTO: 26 PG (ref 24–34)
MCHC RBC AUTO-ENTMCNC: 32.1 G/DL (ref 31–37)
MCV RBC AUTO: 81 FL (ref 74–97)
MONOCYTES # BLD: 0.7 K/UL (ref 0.05–1.2)
MONOCYTES NFR BLD: 8 % (ref 3–10)
NEUTS SEG # BLD: 5.1 K/UL (ref 1.8–8)
NEUTS SEG NFR BLD: 57 % (ref 40–73)
PLATELET # BLD AUTO: 285 K/UL (ref 135–420)
PMV BLD AUTO: 10.9 FL (ref 9.2–11.8)
POTASSIUM SERPL-SCNC: 3.9 MMOL/L (ref 3.5–5.5)
RBC # BLD AUTO: 4.69 M/UL (ref 4.2–5.3)
SODIUM SERPL-SCNC: 140 MMOL/L (ref 136–145)
TROPONIN I SERPL-MCNC: <0.02 NG/ML (ref 0–0.04)
WBC # BLD AUTO: 9 K/UL (ref 4.6–13.2)

## 2018-12-02 PROCEDURE — 74011000258 HC RX REV CODE- 258: Performed by: NURSE PRACTITIONER

## 2018-12-02 PROCEDURE — 80048 BASIC METABOLIC PNL TOTAL CA: CPT

## 2018-12-02 PROCEDURE — 96376 TX/PRO/DX INJ SAME DRUG ADON: CPT

## 2018-12-02 PROCEDURE — 93005 ELECTROCARDIOGRAM TRACING: CPT

## 2018-12-02 PROCEDURE — 82962 GLUCOSE BLOOD TEST: CPT

## 2018-12-02 PROCEDURE — 96365 THER/PROPH/DIAG IV INF INIT: CPT

## 2018-12-02 PROCEDURE — 74011250637 HC RX REV CODE- 250/637: Performed by: HOSPITALIST

## 2018-12-02 PROCEDURE — 83036 HEMOGLOBIN GLYCOSYLATED A1C: CPT

## 2018-12-02 PROCEDURE — 65660000000 HC RM CCU STEPDOWN

## 2018-12-02 PROCEDURE — 99285 EMERGENCY DEPT VISIT HI MDM: CPT

## 2018-12-02 PROCEDURE — 74011000250 HC RX REV CODE- 250: Performed by: NURSE PRACTITIONER

## 2018-12-02 PROCEDURE — 71045 X-RAY EXAM CHEST 1 VIEW: CPT

## 2018-12-02 PROCEDURE — 82553 CREATINE MB FRACTION: CPT

## 2018-12-02 PROCEDURE — 85025 COMPLETE CBC W/AUTO DIFF WBC: CPT

## 2018-12-02 PROCEDURE — 74011636637 HC RX REV CODE- 636/637: Performed by: HOSPITALIST

## 2018-12-02 RX ORDER — ASPIRIN 81 MG/1
81 TABLET ORAL DAILY
Status: DISCONTINUED | OUTPATIENT
Start: 2018-12-03 | End: 2018-12-05

## 2018-12-02 RX ORDER — DEXTROSE MONOHYDRATE 25 G/50ML
25-50 INJECTION, SOLUTION INTRAVENOUS AS NEEDED
Status: DISCONTINUED | OUTPATIENT
Start: 2018-12-02 | End: 2018-12-08 | Stop reason: HOSPADM

## 2018-12-02 RX ORDER — INSULIN LISPRO 100 [IU]/ML
INJECTION, SOLUTION INTRAVENOUS; SUBCUTANEOUS
Status: DISCONTINUED | OUTPATIENT
Start: 2018-12-02 | End: 2018-12-08 | Stop reason: HOSPADM

## 2018-12-02 RX ORDER — ACETAMINOPHEN 325 MG/1
650 TABLET ORAL
Status: DISCONTINUED | OUTPATIENT
Start: 2018-12-02 | End: 2018-12-08 | Stop reason: HOSPADM

## 2018-12-02 RX ORDER — METOPROLOL TARTRATE 25 MG/1
25 TABLET, FILM COATED ORAL 2 TIMES DAILY
Status: DISCONTINUED | OUTPATIENT
Start: 2018-12-02 | End: 2018-12-03

## 2018-12-02 RX ORDER — ONDANSETRON 4 MG/1
4 TABLET, ORALLY DISINTEGRATING ORAL
Status: DISCONTINUED | OUTPATIENT
Start: 2018-12-02 | End: 2018-12-08 | Stop reason: HOSPADM

## 2018-12-02 RX ORDER — LORAZEPAM 0.5 MG/1
0.5 TABLET ORAL 3 TIMES DAILY
COMMUNITY
End: 2018-12-07

## 2018-12-02 RX ORDER — ATORVASTATIN CALCIUM 20 MG/1
20 TABLET, FILM COATED ORAL DAILY
Status: DISCONTINUED | OUTPATIENT
Start: 2018-12-03 | End: 2018-12-08 | Stop reason: HOSPADM

## 2018-12-02 RX ORDER — MAGNESIUM SULFATE 100 %
4 CRYSTALS MISCELLANEOUS AS NEEDED
Status: DISCONTINUED | OUTPATIENT
Start: 2018-12-02 | End: 2018-12-08 | Stop reason: HOSPADM

## 2018-12-02 RX ORDER — DILTIAZEM HYDROCHLORIDE 5 MG/ML
20 INJECTION INTRAVENOUS
Status: COMPLETED | OUTPATIENT
Start: 2018-12-02 | End: 2018-12-02

## 2018-12-02 RX ADMIN — DILTIAZEM HYDROCHLORIDE 10 MG/HR: 5 INJECTION INTRAVENOUS at 20:33

## 2018-12-02 RX ADMIN — DILTIAZEM HYDROCHLORIDE 20 MG: 5 INJECTION INTRAVENOUS at 15:30

## 2018-12-02 RX ADMIN — DILTIAZEM HYDROCHLORIDE 7.5 MG/HR: 5 INJECTION INTRAVENOUS at 19:57

## 2018-12-02 RX ADMIN — APIXABAN 5 MG: 5 TABLET, FILM COATED ORAL at 21:43

## 2018-12-02 RX ADMIN — METOPROLOL TARTRATE 25 MG: 25 TABLET ORAL at 22:04

## 2018-12-02 RX ADMIN — INSULIN LISPRO 4 UNITS: 100 INJECTION, SOLUTION INTRAVENOUS; SUBCUTANEOUS at 21:43

## 2018-12-02 RX ADMIN — DILTIAZEM HYDROCHLORIDE 2.5 MG/HR: 5 INJECTION INTRAVENOUS at 18:52

## 2018-12-02 NOTE — ED PROVIDER NOTES
Rafaela Morales is a 74year old female who presents to the ED with a c/o heart palpitations. Pt states it started this morning after having a bad dream that awoke her. 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 She was sent here to the ED by them after they called [facility] for atrial fibrillation by EKG. Pt denies pain. States this has never happened before. Admits only to HTN, and is complaint with her HTN meds. Past Medical History:  
Diagnosis Date  Breast calcifications Left breast   
 Chronic kidney disease  Colon polyps  Diabetes ([facility] [address]  Fatty liver  Hypertension  Menopause Past Surgical History:  
Procedure Laterality Date  ENDOSCOPY, COLON, DIAGNOSTIC    
 HX APPENDECTOMY  HX BREAST BIOPSY Left 9/3/2010 Left breast  
 HX BREAST BIOPSY Liver  HX CHOLECYSTECTOMY  HX HYSTERECTOMY  1990  
 HX TONSILLECTOMY Family History:  
Problem Relation Age of Onset  Cancer Sister 71  
     breast cancer  Breast Cancer Sister 79  Hypertension Mother  Stroke Mother 59  Diabetes Other   
     grandmother  Colon Cancer Father 67 Social History Socioeconomic History  Marital status:  Spouse name: Not on file  Number of children: Not on file  Years of education: Not on file  Highest education level: Not on file Social Needs  Financial resource strain: Not on file  Food insecurity - worry: Not on file  Food insecurity - inability: Not on file  Transportation needs - medical: Not on file  Transportation needs - non-medical: Not on file Occupational History  Not on file Tobacco Use  Smoking status: Former Smoker  Smokeless tobacco: Never Used Substance and Sexual Activity  Alcohol use: No  
 Drug use:  No  
  Sexual activity: Not on file Other Topics Concern  Not on file Social History Narrative  Not on file ALLERGIES: Ciprofloxacin; Codeine; Penicillins; and Sulfa (sulfonamide antibiotics) Review of Systems Constitutional: Negative. HENT: Negative. Eyes: Negative. Respiratory: Negative. Cardiovascular: Negative. Gastrointestinal: Negative. Genitourinary: Negative. Musculoskeletal: Negative. Skin: Negative. Neurological: Negative. Psychiatric/Behavioral: Negative. Vitals:  
 12/02/18 1523 12/02/18 1525 12/02/18 1530 12/02/18 1545 BP:   103/60 125/64 Pulse:   80 75 Resp:   17 19 Temp: 98.2 °F (36.8 °C) SpO2:   96% 95% Weight:  90.7 kg (200 lb) Height:  5' 3\" (1.6 m) Physical Exam  
Constitutional: She appears well-developed and well-nourished. No distress. HENT:  
Head: Normocephalic and atraumatic. Nose: Nose normal.  
Eyes: EOM are normal. Right eye exhibits no discharge. Left eye exhibits no discharge. No scleral icterus. Neck: Normal range of motion. Neck supple. No tracheal deviation present. Cardiovascular: Normal rate, regular rhythm and intact distal pulses. Pulmonary/Chest: Effort normal and breath sounds normal.  
Abdominal: Soft. She exhibits no distension. Genitourinary:  
Genitourinary Comments: NE 
  
Musculoskeletal: Normal range of motion. She exhibits edema (bilateral pedal edema. ). She exhibits no deformity. Neurological: She is alert. Coordination normal.  
Skin: Skin is warm and dry. NE. Psychiatric: She has a normal mood and affect. Her behavior is normal.  
Nursing note and vitals reviewed. MDM Number of Diagnoses or Management Options Atrial fibrillation with RVR ([facility] [address]):  
New onset a-fib [facility]):  
Diagnosis management comments: PROGRESS NOTE:  The CXR was reviewed. No acute findings. No central congestion noted.   Marcio Schaefer NP  4:22 PM 
 
 PROGRESS NOTE:  Called LIZETT Almanzar from cardiology, and advised her that the Pt slipped back into an uncontrolled rate and that we are admitting her. She will round on the Pt in the morning. Carole Cobos NP  6:10 PM 
 
CONSULT:  Spoke with Dr Greta Law, who has accepted the Pt for admission. Carole Cobos NP  6:20 PM 
 
 
 
  
Amount and/or Complexity of Data Reviewed Clinical lab tests: ordered and reviewed Tests in the radiology section of CPT®: ordered and reviewed Discuss the patient with other providers: yes (Abdiel Villafana PA-C) Independent visualization of images, tracings, or specimens: yes (Plain film XR) Risk of Complications, Morbidity, and/or Mortality Presenting problems: high Diagnostic procedures: high Management options: high Patient Progress Patient progress: stable 5:27 PM: Consult:  Discussed care with Davian Powell PA-C (Cardiology) Standard discussion; including history of patients chief complaint, available diagnostic results, and treatment course. She says as long as patient's rate is controlled and she is nonsymptomatic, she can follow up by calling them tomorrow morning and making an appointment. ED Course as of Dec 02 1615 Alexus Mancillar Dec 02, 2018  
1530 I was personally available for consultation in the emergency department. Gabriela Pond MD   [BT] 1609 Pt with rate much improved after medications   [BT] ED Course User Index [BT] Caren Cisneros MD  
 
 
Procedures Diagnosis: 1. New onset a-fib ([address] 2. Atrial fibrillation with RVR ([address] Disposition:    Admitted - Dr Greta Law Follow-up Information Follow up With Specialties Details Why Contact Info Mercedes Reynolds MD Internal Medicine   [address] [facility] [address] [phone] Félix Engel MD Cardiology, Internal Medicine Call in the [address] [facility] fo ran appointment. [address] [facility] [address] [facility] [place] [facility] [address] [phone] Medication List  
  
START taking these medications   
apixaban 5 mg (74 tabs) starter pack Commonly known as:  Olivia Ag Take 10 mg (two 5 mg tablets) by mouth twice a day for 7 days Followed by 5 mg (one 5 mg tablet) by mouth twice a day ASK your doctor about these medications   
aspirin 81 mg tablet * ATACAND HCT 32-12.5 mg per tablet Generic drug:  candesartan-hydroCHLOROthiazide * ATACAND HCT 32-12.5 mg per tablet Generic drug:  candesartan-hydroCHLOROthiazide BENADRYL ALLERGY PO 
  
cloNIDine HCl 0.2 mg tablet Commonly known as:  CATAPRES 
  
glimepiride 4 mg tablet Commonly known as:  AMARYL 
  
GLUCOPHAGE 1,000 mg tablet Generic drug:  metFORMIN 
  
LIPITOR 20 mg tablet Generic drug:  atorvastatin 
  
milk thistle 300 mg Cap NORVASC 5 mg tablet Generic drug:  amLODIPine PROVENTIL HFA 90 mcg/actuation inhaler Generic drug:  albuterol TENORMIN 100 mg tablet Generic drug:  atenolol * This list has 2 medication(s) that are the same as other medications prescribed for you. Read the directions carefully, and ask your doctor or other care provider to review them with you. Where to Get Your Medications Information about where to get these medications is not yet available Ask your nurse or doctor about these medications · apixaban 5 mg (74 tabs) starter pack

## 2018-12-02 NOTE — ED TRIAGE NOTES
Patient woke up this morning having palpitations. Spent the morning volunteering and then went to urgent care because it was not getting any better. Patient denies chest pain or SOB

## 2018-12-03 ENCOUNTER — APPOINTMENT (OUTPATIENT)
Dept: NON INVASIVE DIAGNOSTICS | Age: 73
DRG: 310 | End: 2018-12-03
Attending: HOSPITALIST
Payer: MEDICARE

## 2018-12-03 LAB
ANION GAP SERPL CALC-SCNC: 10 MMOL/L (ref 3–18)
ATRIAL RATE: 101 BPM
ATRIAL RATE: 150 BPM
BUN SERPL-MCNC: 14 MG/DL (ref 7–18)
BUN/CREAT SERPL: 20 (ref 12–20)
CALCIUM SERPL-MCNC: 8.9 MG/DL (ref 8.5–10.1)
CALCULATED R AXIS, ECG10: 28 DEGREES
CALCULATED R AXIS, ECG10: 33 DEGREES
CALCULATED T AXIS, ECG11: 129 DEGREES
CALCULATED T AXIS, ECG11: 96 DEGREES
CHLORIDE SERPL-SCNC: 103 MMOL/L (ref 100–108)
CO2 SERPL-SCNC: 27 MMOL/L (ref 21–32)
CREAT SERPL-MCNC: 0.71 MG/DL (ref 0.6–1.3)
DIAGNOSIS, 93000: NORMAL
DIAGNOSIS, 93000: NORMAL
GLUCOSE BLD STRIP.AUTO-MCNC: 185 MG/DL (ref 70–110)
GLUCOSE BLD STRIP.AUTO-MCNC: 188 MG/DL (ref 70–110)
GLUCOSE BLD STRIP.AUTO-MCNC: 241 MG/DL (ref 70–110)
GLUCOSE BLD STRIP.AUTO-MCNC: 242 MG/DL (ref 70–110)
GLUCOSE BLD STRIP.AUTO-MCNC: 286 MG/DL (ref 70–110)
GLUCOSE SERPL-MCNC: 303 MG/DL (ref 74–99)
POTASSIUM SERPL-SCNC: 4.1 MMOL/L (ref 3.5–5.5)
Q-T INTERVAL, ECG07: 312 MS
Q-T INTERVAL, ECG07: 412 MS
QRS DURATION, ECG06: 80 MS
QRS DURATION, ECG06: 82 MS
QTC CALCULATION (BEZET), ECG08: 463 MS
QTC CALCULATION (BEZET), ECG08: 486 MS
SODIUM SERPL-SCNC: 140 MMOL/L (ref 136–145)
VENTRICULAR RATE, ECG03: 146 BPM
VENTRICULAR RATE, ECG03: 76 BPM

## 2018-12-03 PROCEDURE — 74011636637 HC RX REV CODE- 636/637: Performed by: HOSPITALIST

## 2018-12-03 PROCEDURE — 80048 BASIC METABOLIC PNL TOTAL CA: CPT

## 2018-12-03 PROCEDURE — 36415 COLL VENOUS BLD VENIPUNCTURE: CPT

## 2018-12-03 PROCEDURE — 74011250637 HC RX REV CODE- 250/637: Performed by: HOSPITALIST

## 2018-12-03 PROCEDURE — 74011250637 HC RX REV CODE- 250/637: Performed by: INTERNAL MEDICINE

## 2018-12-03 PROCEDURE — 82962 GLUCOSE BLOOD TEST: CPT

## 2018-12-03 PROCEDURE — 65660000000 HC RM CCU STEPDOWN

## 2018-12-03 PROCEDURE — 74011250637 HC RX REV CODE- 250/637: Performed by: PHYSICIAN ASSISTANT

## 2018-12-03 RX ORDER — METOPROLOL TARTRATE 25 MG/1
25 TABLET, FILM COATED ORAL ONCE
Status: COMPLETED | OUTPATIENT
Start: 2018-12-03 | End: 2018-12-03

## 2018-12-03 RX ORDER — INSULIN GLARGINE 100 [IU]/ML
10 INJECTION, SOLUTION SUBCUTANEOUS
Status: DISCONTINUED | OUTPATIENT
Start: 2018-12-03 | End: 2018-12-04

## 2018-12-03 RX ORDER — METOPROLOL TARTRATE 50 MG/1
50 TABLET ORAL 3 TIMES DAILY
Status: DISCONTINUED | OUTPATIENT
Start: 2018-12-03 | End: 2018-12-04

## 2018-12-03 RX ORDER — METOPROLOL TARTRATE 50 MG/1
50 TABLET ORAL 2 TIMES DAILY
Status: DISCONTINUED | OUTPATIENT
Start: 2018-12-03 | End: 2018-12-03 | Stop reason: SDUPTHER

## 2018-12-03 RX ORDER — LORAZEPAM 0.5 MG/1
0.5 TABLET ORAL ONCE
Status: COMPLETED | OUTPATIENT
Start: 2018-12-03 | End: 2018-12-03

## 2018-12-03 RX ORDER — MINOXIDIL 10 MG/1
10 TABLET ORAL AS NEEDED
COMMUNITY
End: 2018-12-07

## 2018-12-03 RX ADMIN — METOPROLOL TARTRATE 25 MG: 25 TABLET ORAL at 10:20

## 2018-12-03 RX ADMIN — APIXABAN 5 MG: 5 TABLET, FILM COATED ORAL at 17:17

## 2018-12-03 RX ADMIN — ASPIRIN 81 MG: 81 TABLET, COATED ORAL at 08:30

## 2018-12-03 RX ADMIN — INSULIN GLARGINE 10 UNITS: 100 INJECTION, SOLUTION SUBCUTANEOUS at 21:15

## 2018-12-03 RX ADMIN — INSULIN LISPRO 6 UNITS: 100 INJECTION, SOLUTION INTRAVENOUS; SUBCUTANEOUS at 12:19

## 2018-12-03 RX ADMIN — LORAZEPAM 0.5 MG: 0.5 TABLET ORAL at 11:17

## 2018-12-03 RX ADMIN — ATORVASTATIN CALCIUM 20 MG: 20 TABLET, FILM COATED ORAL at 08:30

## 2018-12-03 RX ADMIN — INSULIN LISPRO 3 UNITS: 100 INJECTION, SOLUTION INTRAVENOUS; SUBCUTANEOUS at 21:16

## 2018-12-03 RX ADMIN — METOPROLOL TARTRATE 50 MG: 50 TABLET ORAL at 21:16

## 2018-12-03 RX ADMIN — METOPROLOL TARTRATE 50 MG: 50 TABLET ORAL at 15:51

## 2018-12-03 RX ADMIN — METOPROLOL TARTRATE 50 MG: 50 TABLET ORAL at 05:46

## 2018-12-03 RX ADMIN — APIXABAN 5 MG: 5 TABLET, FILM COATED ORAL at 08:30

## 2018-12-03 RX ADMIN — INSULIN LISPRO 6 UNITS: 100 INJECTION, SOLUTION INTRAVENOUS; SUBCUTANEOUS at 08:32

## 2018-12-03 RX ADMIN — INSULIN LISPRO 3 UNITS: 100 INJECTION, SOLUTION INTRAVENOUS; SUBCUTANEOUS at 17:18

## 2018-12-03 NOTE — H&P
Medicine History and Physical 
Patient: Wil Varner   Age:  74 y.o. Assessment Principal Problem: 
  Atrial fibrillation with RVR ([facility] (12/2/2018) Active Problems: 
  HTN (hypertension) (12/2/2018) DM (diabetes mellitus) ([facility] (12/2/2018) Plan 1)  Atrial fibrillation with RVR 
 - Start eliquis 
 - Echo  
 - metoprolol started stop cardizem drip for now - Cards consulted 2)  DM 
 - SSI 3)  HTN 
 - holding meds for now to allow for titration of rate control meds, restart should bp spike. DISPO 
-Pt to be admitted  at this time for reasons addressed above, continued hospitalization for ongoing assessment and treatment indicated Anticipated Date of Discharge: 1-2 days Anticipated Disposition (home, SNF) : home Chief Complaint:  
Chief Complaint Patient presents with  Palpitations HPI:  
Wil Varner is a 74y.o. year old female who presents with  Palpitations and SOB that started today. 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 She checked her HR it was in 130's/140's . She went to urgent care and was sent here. She has hx of pSVT as per sentara chart but doesn't appear to have been on a blood thinner previously. Given she is back in afib RVR will admit for med titration and start of eliquis. Review of Systems - positive responses in bold type Constitutional: Negative for fever, chills, diaphoresis and unexpected weight change. HENT: Negative for ear pain, congestion, sore throat, rhinorrhea, drooling, trouble swallowing, neck pain and tinnitus. Eyes: Negative for photophobia, pain, redness and visual disturbance. Respiratory: negative for shortness of breath, cough, choking, chest tightness, wheezing or stridor. Cardiovascular: Negative for chest pain, palpitations and leg swelling.   
Gastrointestinal: Negative for nausea, vomiting, abdominal pain, diarrhea, constipation, blood in stool, abdominal distention and anal bleeding. Genitourinary: Negative for dysuria, urgency, frequency, hematuria, flank pain and difficulty urinating. Musculoskeletal: Negative for back pain and arthralgias. Skin: Negative for color change, rash and wound. Neurological: Negative for dizziness, seizures, syncope, speech difficulty, light-headedness or headaches. Hematological: Does not bruise/bleed easily. Psychiatric/Behavioral: Negative for suicidal ideas, hallucinations, behavioral problems, self-injury or agitation Past Medical History: 
Past Medical History:  
Diagnosis Date  Breast calcifications Left breast   
 Chronic kidney disease  Colon polyps  Diabetes ([facility] [address]  Fatty liver  Hypertension  Menopause Past Surgical History: 
Past Surgical History:  
Procedure Laterality Date  ENDOSCOPY, COLON, DIAGNOSTIC    
 HX APPENDECTOMY  HX BREAST BIOPSY Left 9/3/2010 Left breast  
 HX BREAST BIOPSY Liver  HX CHOLECYSTECTOMY  HX HYSTERECTOMY  1990  
 HX TONSILLECTOMY Family History: 
Family History Problem Relation Age of Onset  Cancer Sister 71  
     breast cancer  Breast Cancer Sister 79  Hypertension Mother  Stroke Mother 59  Diabetes Other   
     grandmother  Colon Cancer Father 67 Social History: 
Social History Socioeconomic History  Marital status:  Spouse name: Not on file  Number of children: Not on file  Years of education: Not on file  Highest education level: Not on file Tobacco Use  Smoking status: Former Smoker  Smokeless tobacco: Never Used Substance and Sexual Activity  Alcohol use: No  
 Drug use: No  
 
 
Home Medications: 
Prior to Admission medications Medication Sig Start Date End Date Taking? Authorizing Provider  
amLODIPine (NORVASC) 5 mg tablet Take 10 mg by mouth daily.     Other, MD Marce  
 cloNIDine HCl (CATAPRES) 0.2 mg tablet Take 0.2 mg by mouth two (2) times a day. Chris, MD Marce  
glimepiride (AMARYL) 4 mg tablet Take 4 mg by mouth two (2) times a day. Marce Perez MD  
candesartan-hydroCHLOROthiazide (ATACAND HCT) 32-12.5 mg per tablet Take 1 Tab by mouth daily. Marce Perez MD  
atorvastatin (LIPITOR) 20 mg tablet Take  by mouth daily. Provider, Historical  
albuterol (PROVENTIL HFA) 90 mcg/actuation inhaler Take  by inhalation. Provider, Historical  
milk thistle 300 mg Cap Take  by mouth. Provider, Historical  
metFORMIN (GLUCOPHAGE) 1,000 mg tablet Take 1,000 mg by mouth two (2) times daily (with meals). Provider, Historical  
atenolol (TENORMIN) 100 mg tablet Take 100 mg by mouth daily. Provider, Historical  
candesartan-hydrochlorothiazide (ATACAND HCT) 32-12.5 mg per tablet Take 1 Tab by mouth daily. Provider, Historical  
aspirin 81 mg tablet Take 81 mg by mouth. Provider, Historical  
DIPHENHYDRAMINE HCL (BENADRYL ALLERGY PO) Take  by mouth. Provider, Historical  
 
 
Allergies: Allergies Allergen Reactions  Ciprofloxacin Hives  Codeine Other (comments) States not allergic  Penicillins Not Reported This Time  Sulfa (Sulfonamide Antibiotics) Hives Physical Exam:  
 
Visit Vitals /64 Pulse 75 Temp 98.2 °F (36.8 °C) Resp 19 Ht 5' 3\" (1.6 m) Wt 90.7 kg (200 lb) SpO2 95% BMI 35.43 kg/m² Physical Exam: 
General appearance: alert, cooperative, no distress, appears stated age Head: Normocephalic, without obvious abnormality, atraumatic Neck: supple, trachea midline Lungs: clear to auscultation bilaterally Heart: Irreg irreg tachy Abdomen: soft, non-tender. Bowel sounds normal. No masses,  no organomegaly Extremities: extremities normal, atraumatic, no cyanosis or edema Skin: Skin color, texture, turgor normal. No rashes or lesions Neurologic: Grossly normal 
 
Intake and Output: Current Shift:  No intake/output data recorded. Last three shifts:  No intake/output data recorded. Lab/Data Reviewed: 
CMP:  
Lab Results Component Value Date/Time  12/02/2018 03:20 PM  
 K 3.9 12/02/2018 03:20 PM  
  12/02/2018 03:20 PM  
 CO2 28 12/02/2018 03:20 PM  
 AGAP 9 12/02/2018 03:20 PM  
  (H) 12/02/2018 03:20 PM  
 BUN 19 (H) 12/02/2018 03:20 PM  
 CREA 0.86 12/02/2018 03:20 PM  
 GFRAA >60 12/02/2018 03:20 PM  
 GFRNA >60 12/02/2018 03:20 PM  
 CA 8.8 12/02/2018 03:20 PM  
 
CBC:  
Lab Results Component Value Date/Time WBC 9.0 12/02/2018 03:20 PM  
 HGB 12.2 12/02/2018 03:20 PM  
 HCT 38.0 12/02/2018 03:20 PM  
  12/02/2018 03:20 PM  
 
All Cardiac Markers in the last 24 hours:  
Lab Results Component Value Date/Time CPK 56 12/02/2018 03:20 PM  
 CKMB 1.2 12/02/2018 03:20 PM  
 CKND1 2.1 12/02/2018 03:20 PM  
 TROIQ <0.02 12/02/2018 03:20 PM  
 
 
EKG: tracing reviewed  independently -atrial fib Sue Arriaga MD 
 
December 2, 2018

## 2018-12-03 NOTE — PROGRESS NOTES
Bedside shift change report given to KEELEY Pierre (oncoming nurse) by Derian Gonzalez RN (offgoing nurse). Report included the following information SBAR, Kardex, Intake/Output, MAR and Recent Results. 
   
1470 -- AM medications given, well tolerated, will continue to monitor. No pain noted. 1023 -- Spoke with Dr. Diana Luevano, about patient's' anxiety, further orders to give Ativan 0.5 mg once. 
   
1059 -- Reassessment completed, no change in patient condition, will continue to monitor. 
   
1520 -- Reassessment completed, no change in patient condition, will continue to monitor. 
   
Bedside shift change report given to KEELEY Miller (oncoming nurse) by Loraine Hess RN (offgoing nurse). Report included the following information SBAR, Kardex, Intake/Output, MAR and Recent Results.

## 2018-12-03 NOTE — PROGRESS NOTES
Problem: Falls - Risk of 
Goal: *Absence of Falls Document Radha Deal Fall Risk and appropriate interventions in the flowsheet. Outcome: Progressing Towards Goal 
Fall Risk Interventions: 
  
 
  
 
Medication Interventions: Teach patient to arise slowly

## 2018-12-03 NOTE — DIABETES MGMT
Diabetes Patient/Family Education RecordFactors That  May Influence Patients Ability  to Learn or  Comply with Recommendations []   Language barrier    []   Cultural needs   []   Motivation  
 []   Cognitive limitation    []   Physical   [x]   Education  
 []   Physiological factors   []   Hearing/vision/speaking impairment   []   Sikhism beliefs []   Financial factors   []  Other:   []  No factors identified at this time. Person Instructed: [x]   Patient   []   Family   []  Other Preference for Learning: 
 [x]   Verbal   [x]   Written   []  Demonstration Level of Comprehension & Competence:   
[x]  Good                                      [] Fair                                     []  Poor                             []  Needs Reinforcement  
[x]  Teachback completed Education Component:  
[x]  Medication management, including how to administer insulin (if appropriate) and potential medication interactions patient taking metformin 1000 mg twice daily and glimepiride 4 mg twice a day. She states she doesn't miss doses. Has never been prescribed insulin for home, pt is reluctant. \"I am afraid that I will have a low blood sugar, and I live alone. \"  Explained the benefits of insulin and how to prevent and treat hypoglycemia. Patient prefers to start with diet and exercise first.  Hospital insulin protocol reviewed with pt and pt states understanding. No questions or concerns voiced at this time. [x]  Nutritional management -obtain usual meal pattern states she avoids sweetened beverages. Tries to watch her diet. \"I love bread, but I am trying to stay away from pasta. \"  
[]  Exercise [x]  Signs, symptoms, and treatment of hyperglycemia and hypoglycemia [x] Prevention, recognition and treatment of hyperglycemia and hypoglycemia [x]  Importance of blood glucose monitoring and how to obtain a blood glucose meter has a glucometer and supplies at home. Checks her BG three times daily  
[]  Instruction on use of the blood glucose meter [x]  Discuss the importance of HbA1C monitoring   
[x]  Sick day guidelines  
[x]  Proper use and disposal of lancets, needles, syringes or insulin pens (if appropriate) [x]  Potential long-term complications (retinopathy, kidney disease, neuropathy, foot care) [x] Information about whom to contact in case of emergency or for more information   
[x]  Goal:  Patient/family will demonstrate understanding of Diabetes Self Management Skills by: (date) __12/08/18_____ Plan for post-discharge education or self-management support: 
  [x] Outpatient class schedule provided            [] Patient Declined 
  [] Scheduled for outpatient classes (date) _______ Verify: 
Does patient understand how diabetes medications work? _______yes_____________________ Does patient know what their most recent A1c is? _____________yes______________________ Does patient monitor glucose at home? __________________yes_________________________ Does patient have a glucometer, testing supplies or difficulty obtaining diabetes medications? _________________ Seble Collier RN CDE Ext [phone]

## 2018-12-03 NOTE — PROGRESS NOTES
Problem: Falls - Risk of 
Goal: *Absence of Falls Document Jessica Russo Fall Risk and appropriate interventions in the flowsheet. Outcome: Progressing Towards Goal 
Fall Risk Interventions: 
  
 
  
 
Medication Interventions: Teach patient to arise slowly, Patient to call before getting OOB

## 2018-12-03 NOTE — PROGRESS NOTES
conducted an initial consultation and Spiritual Assessment for Balta Gudino, who is a 74 y.o.,female. Patients Primary Language is: [place]. According to the patients EMR Quaker Affiliation is: Synagogue. The reason the Patient came to the hospital is:  
Patient Active Problem List  
 Diagnosis Date Noted  HTN (hypertension) 12/02/2018  DM (diabetes mellitus) ([facility] [address] 12/02/2018  Atrial fibrillation with RVR ([facility] [address] 12/02/2018  Family history of colon cancer requiring screening colonoscopy 07/01/2014  Abscess of back 10/05/2012 The  provided the following Interventions: 
Initiated a relationship of care and support. Explored issues of jhonny, spirituality and/or Protestant needs while hospitalized. Listened empathically. Provided chaplaincy education. Provided information about Spiritual Care Services. Offered prayer and assurance of continued prayers on patient's behalf. Chart reviewed. The following outcomes were achieved: 
Patient shared some information about their medical narrative and spiritual journey/beliefs. Patient processed feeling about current hospitalization. Patient expressed gratitude for the 's visit. Assessment: 
Patient did not indicate any spiritual or Protestant issues which require Spiritual Care Services interventions at this time. Patient does not have any Protestant/cultural needs that will affect patients preferences in health care. Plan: 
Chaplains will continue to follow and will provide pastoral care on an as needed or requested basis.  recommends bedside caregivers page  on duty if patient shows signs of acute spiritual or emotional distress. [facility] Staff  Spiritual Care  
[phone]

## 2018-12-03 NOTE — PROGRESS NOTES
Progress Note Patient: Charito Rodriguez               Sex: female          DOA: 12/2/2018 YOB: 1945      Age:  74 y.o.        LOS:  LOS: 1 day CHIEF COMPLAINT:  A fib with RVR Subjective:  
 
Patient feels okay Some mild SOB She does not have chest pain and does not feel palpitations. Objective:  
  
Visit Vitals BP (!) 166/96 (BP 1 Location: Right arm, BP Patient Position: Head of bed elevated (Comment degrees)) Pulse (!) 131 Temp 97.8 °F (36.6 °C) Resp 16 Ht 5' 3\" (1.6 m) Wt 90.7 kg (199 lb 15.3 oz) SpO2 97% BMI 35.42 kg/m² Physical Exam: 
Gen:  Patient is in no distress. No complaint HEENT and Neck:  PERRL, No cervical tenderness or masses Lungs:  Clear bilaterally. No rales, no wheeze. Normal effort. Heart:  Tachycardic with irregular rhythm. No murmur or gallop. No JVD. No edema. Abdomen:  Soft, non tender, no masses, no Hepatosplenomegally Extremities:  No digital clubbing, no cyanosis Neuro:  Alert and oriented, Normal affect, Cranial nerves intact, No sensory deficits Lab/Data Reviewed: All lab results for the last 24 hours reviewed. Assessment/Plan Principal Problem: 
  Atrial fibrillation with RVR ([facility] [address] (12/2/2018) Active Problems: 
  HTN (hypertension) (12/2/2018) DM (diabetes mellitus) ([facility] [address] (12/2/2018) Plan: 
Continue with rhythm control Discussed with cardiology BP control BS control DVT prophylaxis.

## 2018-12-03 NOTE — DIABETES MGMT
GLYCEMIC CONTROL AND NUTRITION Assessment/Recommendations: 
Fasting lab glucose this am 303 mg/dl Recommend adding basal insulin. Lantus 10 units daily Advanced corrective insulin coverage to very insulin resistant. Will continue inpatient monitoring. Most recent blood glucose values: 
Results for Zeke Yost (MRN [id number]) as of 12/3/2018 10:00 Ref. Range 12/2/2018 21:11 12/3/2018 07:38 GLUCOSE,FAST - POC Latest Ref Range: 70 - 110 mg/dL 221 (H) 286 (H) Current A1C of 10.2 % is equivalent to average blood glucose of 246 mg/dl over the past 2-3 months. Current hospital diabetes medications:  
Lispro corrective insulin coverage AC&HS Home diabetes medications: 
Glimepiride 4 mg twice a day Metformin 1000 mg twice daily Diet:   
Cardiac consistent carb Education:  __x_Refer to Diabetes Education Record  
          ____Education not indicated at this time Jayne Temple RN CDE Ext [phone]

## 2018-12-03 NOTE — PROGRESS NOTES
2030  Assumed care of alert and oriented female pt. Cardizem drip infusing. Pt is ambulatory. Skin check completed with Carin Buckner RN. 
 
2115  Admission assessment completed, denies pain. 0530  SBP elevated, Dr Fannie Elkins notified. New order received to increase Metoprolol to 50mg twice daily. Give one dose now. Bedside shift change report given to Byron (oncoming nurse) by Deanna Collado (offgoing nurse). Report included the following information SBAR, Kardex, Intake/Output, MAR and Cardiac Rhythm A-Fib.

## 2018-12-03 NOTE — DIABETES MGMT
Diabetes Patient/Family Education RecordFactors That  May Influence Patients Ability  to Learn or  Comply with Recommendations []   Language barrier    []   Cultural needs   []   Motivation  
 []   Cognitive limitation    []   Physical   []   Education  
 []   Physiological factors   []   Hearing/vision/speaking impairment   []   Spiritism beliefs []   Financial factors   []  Other:   [x]  No factors identified at this time. Person Instructed: [x]   Patient   []   Family   []  Other Preference for Learning: 
 [x]   Verbal   []   Written   []  Demonstration Level of Comprehension & Competence:   
[]  Good                                      [x] Fair                                     []  Poor                             [x]  Needs Reinforcement  
[]  Teachback completed Education Component:  
[x]  Medication management, including how to administer insulin (if appropriate) and potential medication interactions [x]  Nutritional management -obtain usual meal pattern  
[]  Exercise  
[]  Signs, symptoms, and treatment of hyperglycemia and hypoglycemia  
[] Prevention, recognition and treatment of hyperglycemia and hypoglycemia  
[]  Importance of blood glucose monitoring and how to obtain a blood glucose meter   
[]  Instruction on use of the blood glucose meter  
[]  Discuss the importance of HbA1C monitoring   
[]  Sick day guidelines  
[]  Proper use and disposal of lancets, needles, syringes or insulin pens (if appropriate) [x]  Potential long-term complications (retinopathy, kidney disease, neuropathy, foot care) [x] Information about whom to contact in case of emergency or for more information   
[x]  Goal:  Patient/family will demonstrate understanding of Diabetes Self Management Skills by: (date) _12/13/18______ Plan for post-discharge education or self-management support: 
  [x] Outpatient class schedule provided            [] Patient Declined [] Scheduled for outpatient classes (date) _______ Verify: 
Does patient understand how diabetes medications work? ____________________________ Does patient know what their most recent A1c is? ___________________________________ Does patient monitor glucose at home? ___________________________________________ Does patient have a glucometer, testing supplies or difficulty obtaining diabetes medications? _________________ Rigo Pastrana RD, CDE Office:  [phone] [place] Pager:  [phone]

## 2018-12-03 NOTE — CONSULTS
[facility] - Consult Note    Date of  Admission: 12/2/2018  3:04 PM   Primary Care Physician:  Sofie Casarez MD      I saw, evaluated, interviewed and examined the patient personally. I agree with the findings and plan of care as documented below with PADANIA note  A.fib with RVR , new diagnosis  [id number]  Agree with anticoagulation  Wean off IV cardizem  Increase oral BB dose   ECHO today    Sheila Quijano MD           Assessment:     - Atrial fibrillation, new diagnosis. CHADS2-Vasc score 5 (HTN, DM, Female, Age +4)  - Hypertension  - Diabetes Mellitus  - BRIELLE, does not use CPAP machine  - Hx renal artery stenosis and angioplasty in 2001     Plan:     - Rate control started on Diltiazem gtt and Lopressor, increasing PO dose for further rate control. One 25 mg Lopressor dose now and increase to 50 mg TID.  - Risk of CVA vs. Risk of bleed was discussed in detail with patient. She understood and agrees to start Eliquis, denies hx of ulcer of GI bleed. Explained signs to look for with GIB.   - Echocardiogram this admission  - Continue with ASA and statin     History of Present Illness: This is a 74 y.o. female admitted for Atrial fibrillation with RVR ([facility] [address] [I48.91]. Patient complains of:  Shortness of breath    This is a 74year old female with a history of diabetes, hypertension, BRIELLE and renal artery stenosis that cardiology is seeing in consult due to afib. She explains that yesterday she felt shortness of breath, denies chest pain, does not recall feeling palpitations. She has had some shortness of breath on occasion, also has some dependent LE edema after standing on feet all day. She has 3-4 pillow orthopnea for years. Has hx of sleep apnea but does not use CPAP machine, states she takes it off at night and does not remember. Denies hx of GI bleed or ulcers. Denies CAD hx, does not smoke or use ETOH.       Cardiac risk factors: dyslipidemia, diabetes mellitus, hypertension, post-menopausal      Review of Symptoms:  Except as stated above include:  Constitutional:  negative  Respiratory:  +dyspnea  Cardiovascular:  Per HPI  Gastrointestinal: negative  Genitourinary:  negative  Musculoskeletal:  Negative  Neurological:  Negative  Dermatological:  Negative  Endocrinological: Negative  Psychological:  Negative    A comprehensive review of systems was negative except for that written in the HPI. Past Medical History:     Past Medical History:   Diagnosis Date    Atrial fibrillation (HCC)     Breast calcifications     Left breast     Chronic kidney disease     Colon polyps     Diabetes ([address]     Fatty liver     Hypertension     Menopause          Social History:     Social History     Socioeconomic History    Marital status:      Spouse name: Not on file    Number of children: Not on file    Years of education: Not on file    Highest education level: Not on file   Tobacco Use    Smoking status: Former Smoker    Smokeless tobacco: Never Used   Substance and Sexual Activity    Alcohol use: No    Drug use: No        Family History:     Family History   Problem Relation Age of Onset    Cancer Sister 71        breast cancer    Breast Cancer Sister 79    Hypertension Mother     Stroke Mother 59    Diabetes Other         grandmother    Colon Cancer Father 67        Medications:      Allergies   Allergen Reactions    Ciprofloxacin Hives    Codeine Other (comments)     States not allergic      Penicillins Not Reported This Time    Sulfa (Sulfonamide Antibiotics) Hives        Current Facility-Administered Medications   Medication Dose Route Frequency    metoprolol tartrate (LOPRESSOR) tablet 50 mg  50 mg Oral BID    aspirin delayed-release tablet 81 mg  81 mg Oral DAILY    atorvastatin (LIPITOR) tablet 20 mg  20 mg Oral DAILY    acetaminophen (TYLENOL) tablet 650 mg  650 mg Oral Q4H PRN    ondansetron (ZOFRAN ODT) tablet 4 mg  4 mg Oral Q4H PRN    insulin lispro (HUMALOG) injection   SubCUTAneous AC&HS    glucose chewable tablet 16 g  4 Tab Oral PRN    glucagon (GLUCAGEN) injection 1 mg  1 mg IntraMUSCular PRN    dextrose (D50W) injection syrg 12.5-25 g  25-50 mL IntraVENous PRN    apixaban (ELIQUIS) tablet 5 mg  5 mg Oral BID    dilTIAZem (CARDIZEM) 125 mg in 0.9% sodium chloride 125 mL infusion  0-15 mg/hr IntraVENous CONTINUOUS    influenza vaccine 2018-19 (6 mos+)(PF) (FLUARIX QUAD/FLULAVAL QUAD) injection 0.5 mL  0.5 mL IntraMUSCular PRIOR TO DISCHARGE         Physical Exam:     Visit Vitals  BP (!) 166/96 (BP 1 Location: Right arm, BP Patient Position: Head of bed elevated (Comment degrees))   Pulse (!) 131   Temp 97.8 °F (36.6 °C)   Resp 16   Ht 5' 3\" (1.6 m)   Wt 199 lb 15.3 oz (90.7 kg)   SpO2 97%   BMI 35.42 kg/m²     BP Readings from Last 3 Encounters:   12/03/18 (!) 166/96   01/31/17 137/62   01/24/17 146/45     Pulse Readings from Last 3 Encounters:   12/03/18 (!) 131   01/31/17 65   01/24/17 64     Wt Readings from Last 3 Encounters:   12/03/18 199 lb 15.3 oz (90.7 kg)   01/31/17 190 lb (86.2 kg)   01/24/17 196 lb (88.9 kg)       General:  alert, cooperative, no distress  Neck:  nontender, no JVD  Lungs:  clear to auscultation bilaterally  Heart:  irregularly irregular rhythm  Abdomen:  abdomen is soft without significant tenderness, masses, organomegaly or guarding  Extremities:  extremities normal, atraumatic, no cyanosis or edema  Skin: Warm and dry.  no hyperpigmentation, vitiligo, or suspicious lesions  Neuro: alert, oriented x3, affect appropriate  Psych: non focal     Data Review:     Recent Labs     12/02/18  1520   WBC 9.0   HGB 12.2   HCT 38.0        Recent Labs     12/03/18  0545 12/02/18  1520    140   K 4.1 3.9    103   CO2 27 28   * 240*   BUN 14 19*   CREA 0.71 0.86   CA 8.9 8.8       Results for orders placed or performed during the hospital encounter of 12/02/18   EKG, 12 LEAD, INITIAL   Result Value Ref Range    Ventricular Rate 146 BPM    Atrial Rate 150 BPM    QRS Duration 80 ms    Q-T Interval 312 ms    QTC Calculation (Bezet) 486 ms    Calculated R Axis 28 degrees    Calculated T Axis 129 degrees    Diagnosis       Undetermined rhythm  Nonspecific ST and T wave abnormality  Abnormal ECG  When compared with ECG of 31-JAN-2017 17:11,  Current undetermined rhythm precludes rhythm comparison, needs review  ST no longer elevated in Inferior leads  ST no longer elevated in Lateral leads  Nonspecific T wave abnormality, worse in Lateral leads         All Cardiac Markers in the last 24 hours:    Lab Results   Component Value Date/Time    CPK 56 12/02/2018 03:20 PM    CKMB 1.2 12/02/2018 03:20 PM    CKND1 2.1 12/02/2018 03:20 PM    TROIQ <0.02 12/02/2018 03:20 PM       Last Lipid:  No results found for: CHOL, CHOLX, CHLST, CHOLV, HDL, LDL, LDLC, DLDLP, TGLX, TRIGL, TRIGP, CHHD, CHHDX    Signed By: Thania Kasper PA-C     December 3, 2018

## 2018-12-03 NOTE — DIABETES MGMT
NUTRITIONAL ASSESSMENT GLYCEMIC CONTROL/ PLAN OF CARE David Benítez           74 y.o.           12/2/2018 1. New onset a-fib ([facility] [address] 2. Atrial fibrillation with RVR ([facility] [address] INTERVENTIONS/PLAN:  
1. Suggest 10 units Lantus/day. 2.  1325-2916 calorie CHO consistent diet to promote weight control (2 g Na) 3. On-going diabetes education. 4.  Monitor po intake, labs and weights. ASSESSMENT:  
Nutritional Status:  Pt is 174% ideal wt; pt appears well nourished at this time. Po intake is adequate. Nutrition Diagnoses: Altered nutrition related labs due to diabetes as evidenced by A1C of 10.2%. Obesity due to excess energy intake as evidenced by BMI of 35.4 kg/m2. Diabetes Management: Pt with elevated A1C, on 2 oral diabetic agents at home. Pt may benefit from starting insulin as outpatient; she expressed reluctance to insulin use but was agreeable to have education on use of insulin pens at this time. Current BG is elevated and suggest low dose of basal insulin while in hospital. 
 
Recent blood glucose:   
12/3/18:  286, 241, 242 - has used 12 units corrective lispro thus far. 12/2/18:  221 Within target range (non-ICU: <140; ICU<180): [] Yes   [x]  No 
 
Current Insulin regimen:  
Corrective lispro, very insulin resistant dosing ACHS Home medication/insulin regimen:  
Metformin 1000 mg BID Glimiperide, 4 mg BID HbA1c: 10.2% - ave BG has been ~ 246 mg/dL over past 3 months. Adequate glycemic control PTA:  [] Yes  [x] No 
  
 
SUBJECTIVE/OBJECTIVE: Information obtained from: chart review, pt 
12/3/18:  Pt attended DM management classes at [facility] several years ago. She reports her appetite is fair. She states her wt has been stable but she maybe \"lsot a few lbs\" PTA. She denies having known food allergies and does not have problems with chewing or swallowing. Diet: 1800 calorie, consistent CHO 1.5 gram Na Patient Vitals for the past 100 hrs: 
 % Diet Eaten 12/03/18 0934 75 % Medications: [x]                Reviewed Most Recent POC Glucose:  
Recent Labs 12/03/18 
0545 12/02/18 
1520 * 240* Labs:  
Lab Results Component Value Date/Time Hemoglobin A1c 10.2 (H) 12/02/2018 03:20 PM  
 
Lab Results Component Value Date/Time Sodium 140 12/03/2018 05:45 AM  
 Potassium 4.1 12/03/2018 05:45 AM  
 Chloride 103 12/03/2018 05:45 AM  
 CO2 27 12/03/2018 05:45 AM  
 Anion gap 10 12/03/2018 05:45 AM  
 Glucose 303 (H) 12/03/2018 05:45 AM  
 BUN 14 12/03/2018 05:45 AM  
 Creatinine 0.71 12/03/2018 05:45 AM  
 Calcium 8.9 12/03/2018 05:45 AM  
 Albumin 3.7 11/10/2015 10:18 PM  
 
 
Anthropometrics: IBW : 52.2 kg (115 lb), % IBW (Calculated): 173.88 %, BMI (calculated): 35.4 Wt Readings from Last 1 Encounters:  
12/03/18 90.7 kg (199 lb 15.3 oz) Ht Readings from Last 1 Encounters:  
12/03/18 5' 3\" (1.6 m) Estimated Nutrition Needs:  7400 Kcals/day, Protein (g): 63 g Fluid (ml): 1800 ml Based on:   [x]          Actual BW    []          ABW   []            Adjusted BW   
    
Nutrition Interventions: 
Diabetes education Goal:  
Blood glucose will be within target range of  mg/dL by 12/6/18. Nutrition Monitoring and Evaluation   
 
[x]     Monitor po intake on meal rounds 
[x]     Continue inpatient monitoring and intervention 
[]     Other: 
 
 
Nutrition Risk:  []   High     []  Moderate    [x]  Minimal/Uncompromised Ofe Dixon RD, CDE Office:  [phone] [place] Pager:  [phone]

## 2018-12-03 NOTE — MANAGEMENT PLAN
Discharge/Transition Planning Interviewed patient. Verified demographics listed on face sheet with patient; all information correct. Pt with Medicare and  for insurance. Patient stated their PCP is Dr Greg Whitfield and has appt on Dec 19th; pt states sees every 3-4 mo. Patient lives in single family home alone . Patient's NOK is son. Patient independent with ADLs prior to admission. No use of DME prior to admission. Has CPAP, but states it  \"is  and need new sleep test\". Retired, but volunteers. Drives Discharge plan is Home Patient has designated ___son_____________________ to participate in his/her discharge plan and to receive any needed information.  
 
-Name: Sulema Reynolds 
-Phone number:[phone] Care Management Interventions PCP Verified by CM: Yes(Dr Greg Whitfield) Last Visit to PCP: 18 Mode of Transport at Discharge: Other (see comment)(son) Transition of Care Consult (CM Consult): Discharge Planning Current Support Network: Own Home, Lives Alone Confirm Follow Up Transport: Self Plan discussed with Pt/Family/Caregiver: Yes Discharge Location Discharge Placement: Home Reason for Admission:   A-fibb RRAT Score:          12 Plan for utilizing home health:     n/a Likelihood of Readmission:  low Transition of Care Plan:      Home Zac Cordero RN BSN Outcomes Manager Pager # [phone]

## 2018-12-03 NOTE — PROGRESS NOTES
conducted a Follow up consultation and Spiritual Assessment for Adonay Leiva, who is a 74 y.o.,female. The  provided the following Interventions: 
Continued the relationship of care and support. 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 Listened empathically to patient. She said this never happened before. She said she is taking medication for high blood pressure, but that is all. She is concerned about her daughter who lives by herself and works in [employer]. Offered prayer and assurance of continued prayer on patient's behalf. Chart reviewed. The following outcomes were achieved: 
Patient expressed gratitude for pastoral care visit. Assessment: 
There are no further spiritual or Sabianist issues which require Spiritual Care Services interventions at this time. Plan: 
Chaplains will continue to follow and provide pastoral care as needed or requested.  recommends bedside caregivers page  on duty if patient shows signs of acute spiritual or emotional distress. The Rev. [address] Spiritual Care Services [address] [facility] [facility] [phone] / [facility] [phone]

## 2018-12-04 ENCOUNTER — APPOINTMENT (OUTPATIENT)
Dept: NON INVASIVE DIAGNOSTICS | Age: 73
DRG: 310 | End: 2018-12-04
Attending: HOSPITALIST
Payer: MEDICARE

## 2018-12-04 LAB
ECHO AO ROOT DIAM: 2.5 CM
ECHO AV PEAK GRADIENT: 0 MMHG
ECHO AV PEAK VELOCITY: 0 CM/S
ECHO EST RA PRESSURE: 10 MMHG
ECHO LA VOL 2C: 55.71 ML (ref 22–52)
ECHO LA VOL 4C: 95.01 ML (ref 22–52)
ECHO LA VOLUME INDEX A2C: 28.87 ML/M2 (ref 16–28)
ECHO LA VOLUME INDEX A4C: 49.24 ML/M2 (ref 16–28)
ECHO LV EDV A2C: 63.7 ML
ECHO LV EDV A4C: 75.2 ML
ECHO LV EDV BP: 70.3 ML (ref 56–104)
ECHO LV EDV INDEX A4C: 39 ML/M2
ECHO LV EDV INDEX BP: 36.4 ML/M2
ECHO LV EDV NDEX A2C: 33 ML/M2
ECHO LV EJECTION FRACTION A2C: 69 %
ECHO LV EJECTION FRACTION A4C: 77 %
ECHO LV EJECTION FRACTION BIPLANE: 73.3 % (ref 55–100)
ECHO LV ESV A2C: 19.7 ML
ECHO LV ESV A4C: 17.1 ML
ECHO LV ESV BP: 18.8 ML (ref 19–49)
ECHO LV ESV INDEX A2C: 10.2 ML/M2
ECHO LV ESV INDEX A4C: 8.9 ML/M2
ECHO LV ESV INDEX BP: 9.7 ML/M2
ECHO LV INTERNAL DIMENSION DIASTOLIC: 3.25 CM (ref 3.9–5.3)
ECHO LV INTERNAL DIMENSION SYSTOLIC: 2.58 CM
ECHO LV IVSD: 1.26 CM (ref 0.6–0.9)
ECHO LV MASS 2D: 170.5 G (ref 67–162)
ECHO LV MASS INDEX 2D: 88.4 G/M2 (ref 43–95)
ECHO LV POSTERIOR WALL DIASTOLIC: 1.45 CM (ref 0.6–0.9)
ECHO LVOT DIAM: 2.13 CM
ECHO LVOT PEAK GRADIENT: 3.5 MMHG
ECHO LVOT PEAK VELOCITY: 93.04 CM/S
ECHO PULMONARY ARTERY SYSTOLIC PRESSURE (PASP): 33 MMHG
ECHO RIGHT VENTRICULAR SYSTOLIC PRESSURE (RVSP): 10 MMHG
ECHO TV REGURGITANT MAX VELOCITY: 0 CM/S
ECHO TV REGURGITANT PEAK GRADIENT: 0 MMHG
GLUCOSE BLD STRIP.AUTO-MCNC: 233 MG/DL (ref 70–110)
GLUCOSE BLD STRIP.AUTO-MCNC: 274 MG/DL (ref 70–110)
GLUCOSE BLD STRIP.AUTO-MCNC: 289 MG/DL (ref 70–110)
GLUCOSE BLD STRIP.AUTO-MCNC: 300 MG/DL (ref 70–110)

## 2018-12-04 PROCEDURE — 74011000258 HC RX REV CODE- 258: Performed by: FAMILY MEDICINE

## 2018-12-04 PROCEDURE — 93306 TTE W/DOPPLER COMPLETE: CPT

## 2018-12-04 PROCEDURE — 74011250636 HC RX REV CODE- 250/636: Performed by: HOSPITALIST

## 2018-12-04 PROCEDURE — 90686 IIV4 VACC NO PRSV 0.5 ML IM: CPT | Performed by: HOSPITALIST

## 2018-12-04 PROCEDURE — 74011000250 HC RX REV CODE- 250: Performed by: PHYSICIAN ASSISTANT

## 2018-12-04 PROCEDURE — 74011636637 HC RX REV CODE- 636/637: Performed by: HOSPITALIST

## 2018-12-04 PROCEDURE — 74011250637 HC RX REV CODE- 250/637: Performed by: HOSPITALIST

## 2018-12-04 PROCEDURE — 74011250637 HC RX REV CODE- 250/637: Performed by: INTERNAL MEDICINE

## 2018-12-04 PROCEDURE — 82962 GLUCOSE BLOOD TEST: CPT

## 2018-12-04 PROCEDURE — 74011250637 HC RX REV CODE- 250/637: Performed by: PHYSICIAN ASSISTANT

## 2018-12-04 PROCEDURE — 74011250637 HC RX REV CODE- 250/637: Performed by: FAMILY MEDICINE

## 2018-12-04 PROCEDURE — 74011000250 HC RX REV CODE- 250: Performed by: FAMILY MEDICINE

## 2018-12-04 PROCEDURE — 90471 IMMUNIZATION ADMIN: CPT

## 2018-12-04 PROCEDURE — 65660000000 HC RM CCU STEPDOWN

## 2018-12-04 RX ORDER — METOPROLOL TARTRATE 5 MG/5ML
5 INJECTION INTRAVENOUS
Status: DISCONTINUED | OUTPATIENT
Start: 2018-12-04 | End: 2018-12-08 | Stop reason: HOSPADM

## 2018-12-04 RX ORDER — LORAZEPAM 0.5 MG/1
0.5 TABLET ORAL
Status: DISCONTINUED | OUTPATIENT
Start: 2018-12-04 | End: 2018-12-08 | Stop reason: HOSPADM

## 2018-12-04 RX ORDER — AMLODIPINE BESYLATE 5 MG/1
10 TABLET ORAL DAILY
Status: DISCONTINUED | OUTPATIENT
Start: 2018-12-04 | End: 2018-12-08 | Stop reason: HOSPADM

## 2018-12-04 RX ORDER — METOPROLOL TARTRATE 50 MG/1
100 TABLET ORAL 2 TIMES DAILY
Status: DISCONTINUED | OUTPATIENT
Start: 2018-12-04 | End: 2018-12-06

## 2018-12-04 RX ORDER — INSULIN GLARGINE 100 [IU]/ML
15 INJECTION, SOLUTION SUBCUTANEOUS
Status: DISCONTINUED | OUTPATIENT
Start: 2018-12-04 | End: 2018-12-05

## 2018-12-04 RX ADMIN — INSULIN LISPRO 12 UNITS: 100 INJECTION, SOLUTION INTRAVENOUS; SUBCUTANEOUS at 08:24

## 2018-12-04 RX ADMIN — INSULIN LISPRO 6 UNITS: 100 INJECTION, SOLUTION INTRAVENOUS; SUBCUTANEOUS at 21:59

## 2018-12-04 RX ADMIN — LORAZEPAM 0.5 MG: 0.5 TABLET ORAL at 00:40

## 2018-12-04 RX ADMIN — METOPROLOL TARTRATE 50 MG: 50 TABLET ORAL at 06:09

## 2018-12-04 RX ADMIN — LORAZEPAM 0.5 MG: 0.5 TABLET ORAL at 22:59

## 2018-12-04 RX ADMIN — DILTIAZEM HYDROCHLORIDE 5 MG/HR: 5 INJECTION INTRAVENOUS at 00:43

## 2018-12-04 RX ADMIN — METOPROLOL TARTRATE 5 MG: 5 INJECTION, SOLUTION INTRAVENOUS at 20:57

## 2018-12-04 RX ADMIN — ATORVASTATIN CALCIUM 20 MG: 20 TABLET, FILM COATED ORAL at 08:25

## 2018-12-04 RX ADMIN — INSULIN LISPRO 9 UNITS: 100 INJECTION, SOLUTION INTRAVENOUS; SUBCUTANEOUS at 12:21

## 2018-12-04 RX ADMIN — LORAZEPAM 0.5 MG: 0.5 TABLET ORAL at 10:49

## 2018-12-04 RX ADMIN — APIXABAN 5 MG: 5 TABLET, FILM COATED ORAL at 17:25

## 2018-12-04 RX ADMIN — INFLUENZA VIRUS VACCINE 0.5 ML: 15; 15; 15; 15 SUSPENSION INTRAMUSCULAR at 11:18

## 2018-12-04 RX ADMIN — APIXABAN 5 MG: 5 TABLET, FILM COATED ORAL at 08:25

## 2018-12-04 RX ADMIN — AMLODIPINE BESYLATE 10 MG: 5 TABLET ORAL at 17:25

## 2018-12-04 RX ADMIN — METOPROLOL TARTRATE 5 MG: 5 INJECTION, SOLUTION INTRAVENOUS at 14:20

## 2018-12-04 RX ADMIN — METOPROLOL TARTRATE 100 MG: 50 TABLET ORAL at 13:40

## 2018-12-04 RX ADMIN — INSULIN GLARGINE 15 UNITS: 100 INJECTION, SOLUTION SUBCUTANEOUS at 21:59

## 2018-12-04 RX ADMIN — INSULIN LISPRO 9 UNITS: 100 INJECTION, SOLUTION INTRAVENOUS; SUBCUTANEOUS at 17:04

## 2018-12-04 RX ADMIN — ASPIRIN 81 MG: 81 TABLET, COATED ORAL at 08:25

## 2018-12-04 NOTE — DIABETES MGMT
GLYCEMIC CONTROL AND NUTRITION Assessment/Recommendations: 
· Fasting blood glucose elevated and recommend increasing basal insulin. Suggest 15 units Lantus/day. · Pt appears  more receptive to learning insulin pen use in event she starts insulin in the future. Attempted to start insulin pen education today however pt stating she received a medication that made her feeling \"whoozy\" and \"tired\", asking that we do the education tomorrow 12/5. · Written materials on use of insulin pens and hypoglycemia left at bedside for now. · Will follow up in the AM (12/5) to attempt insulin pen education. Most recent blood glucose values: 
12/4/18:  300, 274 
12/3/18:  286, 241, 242, 185, 188 Current A1C:  10.2% - ave BG has been ~ 246 mg/dL over past 3 months. Current hospital diabetes medications:  
Lantus 10 units daily Corrective lispro, very insulin resistant dosing ACHS Previous day's insulin requirements: 28 units (10 units Lantus, 18 units corrective lispro) Home diabetes medications: 
Metformin 1000 mg BID Glimiperide, 4 mg BID Diet: consistent CHO 7867-8117 calories, 2 gram Na Education:  ____Refer to Diabetes Education Record __x__Education not indicated at this time - deferring until 12/5 per pt request 
 
 
Amanda Lopez RD, CDE Office:  [phone] [place] Pager:  [phone]

## 2018-12-04 NOTE — PROGRESS NOTES
Progress Note Patient: Bandar Gomez               Sex: female          DOA: 12/2/2018 YOB: 1945      Age:  74 y.o.        LOS:  LOS: 2 days CHIEF COMPLAINT:  A fib with RVR Subjective:  
 
NAD, AAOx3 Objective:  
  
Visit Vitals BP (!) 167/108 Pulse (!) 120 Temp 97.9 °F (36.6 °C) Resp 16 Ht 5' 3\" (1.6 m) Wt 90.3 kg (199 lb) SpO2 97% BMI 35.25 kg/m² Physical Exam: 
Gen:  Patient is in no distress. No complaint HEENT and Neck:  PERRL, No cervical tenderness or masses Lungs:  Clear bilaterally. No rales, no wheeze. Normal effort. Heart:  Tachycardic with irregular rhythm. No murmur or gallop. No JVD. No edema. Abdomen:  Soft, non tender, no masses, no Hepatosplenomegally Extremities:  No digital clubbing, no cyanosis Neuro:  Alert and oriented, Normal affect, Cranial nerves intact, No sensory deficits Lab/Data Reviewed: All lab results for the last 24 hours reviewed. Assessment/Plan Principal Problem: 
  Atrial fibrillation with RVR ([facility] (12/2/2018) Active Problems: 
  HTN (hypertension) (12/2/2018) DM (diabetes mellitus) ([facility] (12/2/2018) Plan: 
Increase metoprolol to 100 BID 
PRN BB

## 2018-12-04 NOTE — PROGRESS NOTES
Problem: Falls - Risk of 
Goal: *Absence of Falls Document Jessica Marina Fall Risk and appropriate interventions in the flowsheet. Outcome: Progressing Towards Goal 
Fall Risk Interventions: 
  
 
  
 
Medication Interventions: Patient to call before getting OOB

## 2018-12-04 NOTE — PROGRESS NOTES
[facility] - Progress Note Admit Date: 12/2/2018 Patient seen and examined. Attempting to gain control of AF ventricular response. No specific complaints except concern about clonidine discontinuation. Agree with assessment and plan as noted below. Patricia Vincent MD 
Assessment: - Atrial fibrillation, new diagnosis. CHADS2-Vasc score 5 (HTN, DM, Female, Age +4) - Hypertension 
- Diabetes Mellitus 
- BRIELLE, does not use CPAP machine - Hx renal artery stenosis and angioplasty in 2001 Plan:  
 
- Changing Lopressor dose to 100 mg BID, resuming Amlodipine, added PRN IV Lopressor for HR >110. Will continue to slowly add back home meds after adjusting rate control regimen. Subjective: No new complaints. No chest pain or shortness of breath. Objective:  
  
Patient Vitals for the past 8 hrs: 
 Temp Pulse Resp BP SpO2  
12/04/18 1216 97.9 °F (36.6 °C) (!) 123 16 (!) 160/98 97 % 12/04/18 0855 97.8 °F (36.6 °C) (!) 116 16 173/89 96 % 12/04/18 0609  (!) 128  (!) 148/105  Patient Vitals for the past 96 hrs: 
 Weight 12/03/18 0523 199 lb 15.3 oz (90.7 kg) 12/02/18 1525 200 lb (90.7 kg) Intake/Output Summary (Last 24 hours) at 12/4/2018 1242 Last data filed at 12/4/2018 8672 Gross per 24 hour Intake 284.92 ml Output  Net 284.92 ml Physical Exam: 
General:  alert, cooperative, no distress Neck:  nontender, no JVD Lungs:  clear to auscultation bilaterally Heart:  irregularly irregular rhythm Abdomen:  abdomen is soft without significant tenderness, masses, organomegaly or guarding Extremities:  extremities normal, atraumatic, no cyanosis or edema Data Review:  
 
Labs: Results:  
   
Chemistry Recent Labs 12/03/18 
0545 12/02/18 
1520 * 240*  140  
K 4.1 3.9  103 CO2 27 28 BUN 14 19* CREA 0.71 0.86 CA 8.9 8.8 AGAP 10 9 BUCR 20 22* CBC w/Diff Recent Labs 12/02/18 
1520 WBC 9.0 RBC 4.69 HGB 12.2 HCT 38.0  
 GRANS 57 LYMPH 34 EOS 1 Cardiac Enzymes No results found for: CPK, CK, CKMMB, CKMB, RCK3, CKMBT, CKNDX, CKND1, NAS, TROPT, TROIQ, DANIEL, TROPT, TNIPOC, BNP, BNPP Coagulation No results for input(s): PTP, INR, APTT in the last 72 hours. No lab exists for component: INREXT Lipid Panel No results found for: CHOL, CHOLPOCT, CHOLX, CHLST, CHOLV, 561105, HDL, LDL, LDLC, DLDLP, 079100, VLDLC, VLDL, TGLX, TRIGL, TRIGP, TGLPOCT, CHHD, CHHDX  
BNP No results found for: BNP, BNPP, XBNPT Liver Enzymes No results for input(s): TP, ALB, TBIL, AP, SGOT, GPT in the last 72 hours. No lab exists for component: DBIL Digoxin Thyroid Studies Lab Results Component Value Date/Time TSH 3.81 (H) 05/15/2015 11:42 AM  
    
 
Signed By: Shannen Coulter. Patricia Prabhakar PA-C December 4, 2018

## 2018-12-04 NOTE — PROGRESS NOTES
Bedside shift change report given to KEELEY Pierre (oncoming nurse) by Reymundo Schneider RN (offgoing nurse). Report included the following information SBAR, Kardex, Intake/Output, MAR and Recent Results. 
   
0825 -- AM medications given, well tolerated, will continue to monitor. No pain noted, A/O x 4, call bell in reach. 
   
1119 -- Reassessment completed, no change in patient condition, will continue to monitor. 1415 -- Call from tele-monitor - 150's, nurse to the room, patient is up and in the bathroom. 1420 -- Metoprolol 5mg IV given for HR >110 
   
1505 -- Reassessment completed, no change in patient condition, will continue to monitor. 
   
Bedside shift change report given to KEELEY Miller (oncoming nurse) by Gretchen Narvaez RN (offgoing nurse). Report included the following information SBAR, Kardex, Intake/Output, MAR and Recent Results.

## 2018-12-04 NOTE — PROGRESS NOTES
Problem: Falls - Risk of 
Goal: *Absence of Falls Document Haylee Britt Fall Risk and appropriate interventions in the flowsheet. Outcome: Progressing Towards Goal 
Fall Risk Interventions: 
  
 
  
 
Medication Interventions: Patient to call before getting OOB

## 2018-12-04 NOTE — PROGRESS NOTES
Bedside shift change report given to Angela FAY (oncoming nurse) by Alexsander Perez, RN (offgoing nurse). Report included the following information SBAR, Kardex, Intake/Output, MAR and Cardiac Rhythm A-Fib. 
 
1920  Shift assessment, denies pain, watching TV in bed. 2116   BP - 168/98. Scheduled Metoprolol given. 2130  Dr Mike Cintron on the unit. Notified of pt's HR and BP despite continuous Cardizem infusion. Verbal order received to increase rate to 5mg/hr. 2330  /99, . Per pt, she takes Ativan for elevated BP to help calm her down. Paged Dr Mike Cintron, telephone order received for 0.5mg Ativan PRN 3 times daily. 0400  Sleeping, /95. Want her home meds restarted. Bedside shift change report given to Byron (oncoming nurse) by Manasa Smith. (offgoing nurse). Report included the following information SBAR, Kardex, Intake/Output, MAR and Cardiac Rhythm A-Fib.

## 2018-12-05 LAB
GLUCOSE BLD STRIP.AUTO-MCNC: 153 MG/DL (ref 70–110)
GLUCOSE BLD STRIP.AUTO-MCNC: 204 MG/DL (ref 70–110)
GLUCOSE BLD STRIP.AUTO-MCNC: 229 MG/DL (ref 70–110)
GLUCOSE BLD STRIP.AUTO-MCNC: 240 MG/DL (ref 70–110)

## 2018-12-05 PROCEDURE — 82962 GLUCOSE BLOOD TEST: CPT

## 2018-12-05 PROCEDURE — 74011250637 HC RX REV CODE- 250/637: Performed by: PHYSICIAN ASSISTANT

## 2018-12-05 PROCEDURE — 74011000250 HC RX REV CODE- 250: Performed by: PHYSICIAN ASSISTANT

## 2018-12-05 PROCEDURE — 74011250637 HC RX REV CODE- 250/637: Performed by: INTERNAL MEDICINE

## 2018-12-05 PROCEDURE — 74011636637 HC RX REV CODE- 636/637: Performed by: HOSPITALIST

## 2018-12-05 PROCEDURE — 65660000000 HC RM CCU STEPDOWN

## 2018-12-05 PROCEDURE — 74011250637 HC RX REV CODE- 250/637: Performed by: HOSPITALIST

## 2018-12-05 RX ORDER — CANDESARTAN 16 MG/1
32 TABLET ORAL DAILY
Status: DISCONTINUED | OUTPATIENT
Start: 2018-12-05 | End: 2018-12-05 | Stop reason: CLARIF

## 2018-12-05 RX ORDER — DILTIAZEM HYDROCHLORIDE 60 MG/1
60 TABLET, FILM COATED ORAL 4 TIMES DAILY
Status: DISCONTINUED | OUTPATIENT
Start: 2018-12-05 | End: 2018-12-06

## 2018-12-05 RX ORDER — DILTIAZEM HYDROCHLORIDE 30 MG/1
30 TABLET, FILM COATED ORAL 4 TIMES DAILY
Status: DISCONTINUED | OUTPATIENT
Start: 2018-12-05 | End: 2018-12-05

## 2018-12-05 RX ORDER — LOSARTAN POTASSIUM 50 MG/1
100 TABLET ORAL DAILY
Status: DISCONTINUED | OUTPATIENT
Start: 2018-12-05 | End: 2018-12-08 | Stop reason: HOSPADM

## 2018-12-05 RX ORDER — INSULIN GLARGINE 100 [IU]/ML
20 INJECTION, SOLUTION SUBCUTANEOUS
Status: DISCONTINUED | OUTPATIENT
Start: 2018-12-05 | End: 2018-12-06

## 2018-12-05 RX ORDER — DILTIAZEM HYDROCHLORIDE 60 MG/1
60 TABLET, FILM COATED ORAL 4 TIMES DAILY
Status: DISCONTINUED | OUTPATIENT
Start: 2018-12-05 | End: 2018-12-05

## 2018-12-05 RX ORDER — HYDROCHLOROTHIAZIDE 25 MG/1
12.5 TABLET ORAL DAILY
Status: DISCONTINUED | OUTPATIENT
Start: 2018-12-05 | End: 2018-12-08 | Stop reason: HOSPADM

## 2018-12-05 RX ORDER — CLONIDINE HYDROCHLORIDE 0.1 MG/1
0.2 TABLET ORAL 2 TIMES DAILY
Status: DISCONTINUED | OUTPATIENT
Start: 2018-12-05 | End: 2018-12-06

## 2018-12-05 RX ADMIN — DILTIAZEM HYDROCHLORIDE 60 MG: 60 TABLET, FILM COATED ORAL at 08:17

## 2018-12-05 RX ADMIN — INSULIN LISPRO 6 UNITS: 100 INJECTION, SOLUTION INTRAVENOUS; SUBCUTANEOUS at 12:47

## 2018-12-05 RX ADMIN — APIXABAN 5 MG: 5 TABLET, FILM COATED ORAL at 17:40

## 2018-12-05 RX ADMIN — ASPIRIN 81 MG: 81 TABLET, COATED ORAL at 08:16

## 2018-12-05 RX ADMIN — LOSARTAN POTASSIUM 100 MG: 50 TABLET ORAL at 10:44

## 2018-12-05 RX ADMIN — INSULIN LISPRO 6 UNITS: 100 INJECTION, SOLUTION INTRAVENOUS; SUBCUTANEOUS at 17:23

## 2018-12-05 RX ADMIN — APIXABAN 5 MG: 5 TABLET, FILM COATED ORAL at 08:15

## 2018-12-05 RX ADMIN — METOPROLOL TARTRATE 5 MG: 5 INJECTION, SOLUTION INTRAVENOUS at 05:06

## 2018-12-05 RX ADMIN — HYDROCHLOROTHIAZIDE 12.5 MG: 25 TABLET ORAL at 08:15

## 2018-12-05 RX ADMIN — INSULIN GLARGINE 20 UNITS: 100 INJECTION, SOLUTION SUBCUTANEOUS at 22:21

## 2018-12-05 RX ADMIN — INSULIN LISPRO 6 UNITS: 100 INJECTION, SOLUTION INTRAVENOUS; SUBCUTANEOUS at 08:18

## 2018-12-05 RX ADMIN — ATORVASTATIN CALCIUM 20 MG: 20 TABLET, FILM COATED ORAL at 08:17

## 2018-12-05 RX ADMIN — CLONIDINE HYDROCHLORIDE 0.2 MG: 0.1 TABLET ORAL at 12:46

## 2018-12-05 RX ADMIN — DILTIAZEM HYDROCHLORIDE 60 MG: 60 TABLET, FILM COATED ORAL at 17:41

## 2018-12-05 RX ADMIN — METOPROLOL TARTRATE 100 MG: 50 TABLET ORAL at 08:17

## 2018-12-05 RX ADMIN — AMLODIPINE BESYLATE 10 MG: 5 TABLET ORAL at 08:16

## 2018-12-05 RX ADMIN — DILTIAZEM HYDROCHLORIDE 30 MG: 30 TABLET, FILM COATED ORAL at 12:47

## 2018-12-05 RX ADMIN — CLONIDINE HYDROCHLORIDE 0.2 MG: 0.1 TABLET ORAL at 17:41

## 2018-12-05 NOTE — PROGRESS NOTES
[facility] - Progress Note Admit Date: 12/2/2018 I saw, evaluated, interviewed and examined the patient personally. I agree with the findings and plan of care as documented below with PA-C note HR and BP still suboptimals Continue BB Agree to start low dose short acting cardizem 30 mg every 6 hours for now DC ASA Continue apixaban Samir Griffin MD 
 
 
 
 
 
Assessment:  
 
 - Atrial fibrillation, new diagnosis. CHADS2-Vasc score 5 (HTN, DM, Female, Age +4) - Hypertension 
- Diabetes Mellitus 
- BRIELLE, does not use CPAP machine - Hx renal artery stenosis and angioplasty in 2001 Plan:  
 
- Started 60 mg Cardizem 4 times daily, afib rates very difficult to control. Also on PO Lopressor 100 mg BID and PRN IV Lopressor - Resumed Atacand and HCTZ, Amlodipine - May be some rebound hypertension after Clonidine, resuming Subjective: No new complaints. Objective:  
  
Patient Vitals for the past 8 hrs: 
 Temp Pulse Resp BP SpO2  
12/05/18 1044  (!) 120  176/81   
12/05/18 0820 97.4 °F (36.3 °C) (!) 145 18 170/89 92 % 12/05/18 0555 98.1 °F (36.7 °C) (!) 142 18 (!) 163/98 95 % 12/05/18 0506  (!) 140    Patient Vitals for the past 96 hrs: 
 Weight 12/05/18 0555 199 lb 4.7 oz (90.4 kg) 12/04/18 1246 199 lb (90.3 kg) 12/03/18 0523 199 lb 15.3 oz (90.7 kg) 12/02/18 1525 200 lb (90.7 kg) Intake/Output Summary (Last 24 hours) at 12/5/2018 1104 Last data filed at 12/4/2018 1859 Gross per 24 hour Intake 360 ml Output 500 ml Net -140 ml Physical Exam: 
General:  alert, cooperative, no distress Neck:  nontender, no JVD Lungs:  clear to auscultation bilaterally Heart:  irregularly irregular rhythm Abdomen:  abdomen is soft without significant tenderness, masses, organomegaly or guarding Extremities:  extremities normal, atraumatic, no cyanosis or edema Data Review:  
 
Labs: Results:  
   
Chemistry Recent Labs 12/03/18 
0545 12/02/18 
1520 * 240*  140  
K 4.1 3.9  103 CO2 27 28 BUN 14 19* CREA 0.71 0.86 CA 8.9 8.8 AGAP 10 9 BUCR 20 22* CBC w/Diff Recent Labs 12/02/18 
1520 WBC 9.0  
RBC 4.69 HGB 12.2 HCT 38.0  
 GRANS 57 LYMPH 34 EOS 1 Cardiac Enzymes No results found for: CPK, CK, CKMMB, CKMB, RCK3, CKMBT, CKNDX, CKND1, NAS, TROPT, TROIQ, DANIEL, TROPT, TNIPOC, BNP, BNPP Coagulation No results for input(s): PTP, INR, APTT in the last 72 hours. No lab exists for component: INREXT Lipid Panel No results found for: CHOL, CHOLPOCT, CHOLX, CHLST, CHOLV, 835385, HDL, LDL, LDLC, DLDLP, 111435, VLDLC, VLDL, TGLX, TRIGL, TRIGP, TGLPOCT, CHHD, CHHDX  
BNP No results found for: BNP, BNPP, XBNPT Liver Enzymes No results for input(s): TP, ALB, TBIL, AP, SGOT, GPT in the last 72 hours. No lab exists for component: DBIL Digoxin Thyroid Studies Lab Results Component Value Date/Time TSH 3.81 (H) 05/15/2015 11:42 AM  
    
 
Signed By: Shannen Coulter. Patricia Prabhakar PA-C December 5, 2018

## 2018-12-05 NOTE — PROGRESS NOTES
Bedside shift change report given to Angela FAY (oncoming nurse) by Levy Mittal, KEELEY (offgoing nurse). Report included the following information SBAR, Kardex, Intake/Output, MAR and Cardiac Rhythm A-Fib. 
  
2010  Shift assessment, denies pain. 2057  PRN Metoprolol given for . 
 
0445  Pt sleeping, no distress noted. 0500  Noted with HR in the high 140s with ambulation to the bathroom. Pt advised to stay in bed and to call for assistance with ambulation. PRN IV Metoprolol given. Bedside shift change report given to KEELEY Pierre (oncoming nurse) by Anjum Aguilar RN (offgoing nurse). Report included the following information SBAR, Kardex, Intake/Output, MAR and Cardiac rhythm A-Fib. Stephanie Buchanan

## 2018-12-05 NOTE — PROGRESS NOTES
Bedside shift change report given to Gabby RN (oncoming nurse) by Gael Arriaga RN (offgoing nurse). Report included the following information SBAR, Kardex, Intake/Output, MAR and Recent Results. 
   
0818 -- AM medications given, well tolerated, will continue to monitor. No pain noted. Orders followed per MEWS score of 4. 
   
1130 -- Reassessment completed, no change in patient condition, will continue to monitor. 
   
1530 -- Reassessment completed, no change in patient condition, will continue to monitor. 1740 -- Meds given. 1936 -- During bedside report patient feeling weak, vitals signs were /72 T 97.7 HR 92 RR 18 O2 96. 
   
Bedside shift change report given to Beth Appiah, [address]) by Ashley Raphael RN (offgoing nurse). Report included the following information SBAR, Kardex, Intake/Output, MAR and Recent Results.

## 2018-12-05 NOTE — PROGRESS NOTES
Problem: Falls - Risk of 
Goal: *Absence of Falls Document Towson Kappa Fall Risk and appropriate interventions in the flowsheet. Outcome: Progressing Towards Goal 
Fall Risk Interventions: 
  
 
  
 
Medication Interventions: Patient to call before getting OOB, Teach patient to arise slowly

## 2018-12-05 NOTE — PROGRESS NOTES
Problem: Falls - Risk of 
Goal: *Absence of Falls Document April Thomas Fall Risk and appropriate interventions in the flowsheet. Outcome: Progressing Towards Goal 
Fall Risk Interventions: 
  
 
  
 
Medication Interventions: Patient to call before getting OOB, Teach patient to arise slowly

## 2018-12-05 NOTE — CDMP QUERY
The medical record reflects the following: 
 
Risk: 75 yo female w/PMH diabetes, admitted with new onset Afib Clinical Indicators: Glucose 12/2 240    12/3 303  12/4 233   12/5 229 Treatment: AC/HS accu checks 
-Sliding scale insulin Please clarify if this patient is being treated/managed for: 
 
=>Diabetes mellitus with hyperglycemia 
=>Other Explanation of clinical findings =>Unable to Determine (no explanation of clinical findings) Please clarify and document your clinical opinion in the progress notes and discharge summary including the definitive and/or presumptive diagnosis, (suspected or probable), related to the above clinical findings. Please include clinical findings supporting your diagnosis. If you DECLINE this query or would like to communicate with [facility], please utilize the \"[employer] message box\" at the TOP of the Progress Note on the right. Thank you, 
Mahamed Guadalupe RN [facility]   [phone]

## 2018-12-05 NOTE — PROGRESS NOTES
Problem: Falls - Risk of 
Goal: *Absence of Falls Document Charito Tanner Fall Risk and appropriate interventions in the flowsheet. Outcome: Progressing Towards Goal 
Fall Risk Interventions: 
  
 
  
 
Medication Interventions: Patient to call before getting OOB, Teach patient to arise slowly

## 2018-12-05 NOTE — PROGRESS NOTES
Progress Note Patient: Makayla Daniel               Sex: female          DOA: 12/2/2018 YOB: 1945      Age:  74 y.o.        LOS:  LOS: 3 days CHIEF COMPLAINT:  A fib with RVR Subjective:  
 
NAD, Feeling well, HR still uncontrolled Objective:  
  
Visit Vitals /83 Pulse (!) 118 Temp 97.9 °F (36.6 °C) Resp 18 Ht 5' 3\" (1.6 m) Wt 90.4 kg (199 lb 4.7 oz) SpO2 95% BMI 35.30 kg/m² Physical Exam: 
Gen:  Patient is in no distress. No complaint HEENT and Neck:  PERRL, No cervical tenderness or masses Lungs:  Clear bilaterally. No rales, no wheeze. Normal effort. Heart:  Tachycardic with irregular rhythm. No murmur or gallop. No JVD. No edema. Abdomen:  Soft, non tender, no masses, no Hepatosplenomegally Extremities:  No digital clubbing, no cyanosis Neuro:  Alert and oriented, Normal affect, Cranial nerves intact, No sensory deficits Lab/Data Reviewed: All lab results for the last 24 hours reviewed. Assessment/Plan Principal Problem: 
  Atrial fibrillation with RVR ([facility] (12/2/2018) Active Problems: 
  HTN (hypertension) (12/2/2018) DM (diabetes mellitus) ([facility] (12/2/2018) Plan: 
metoprolol  100 BID 
PRN lopressor IV Start cardizem 60 q 6 Continue BP meds Losartan Clonidine 
hctz 
norvasc

## 2018-12-05 NOTE — ROUTINE PROCESS
Glycemic Control Plan of Care Follow up today for continued DM education and use of insulin pens. Patient instructed in use of pens including priming and site rotation. Able to give a return demonstration - printed instructions provided. Lantus increased to 20 units today Saige Arrieta MPH RN 
Pager [phone]

## 2018-12-05 NOTE — PROGRESS NOTES
conducted a Follow up consultation and Spiritual Assessment for Che Mendes, who is a 74 y.o.,female. The  provided the following Interventions: 
Continued the relationship of care and support. Listened empathically to patient. She said she feels more relaxed; the doctors are doing their best to find the correct medication. She has positive comments for medical staff. Offered [employer], prayer, and assurance of continued prayer on patient's behalf. Chart reviewed. The following outcomes were achieved: 
Patient expressed gratitude for pastoral care visit. Assessment: 
There are no further spiritual or Confucianism issues which require Spiritual Care Services interventions at this time. Plan: 
Chaplains will continue to follow and provide pastoral care as needed or requested. The Rev. Ha Jurado, Spiritual Care Services [address] [facility] [facility] [phone] / [facility] [phone]

## 2018-12-06 LAB
GLUCOSE BLD STRIP.AUTO-MCNC: 168 MG/DL (ref 70–110)
GLUCOSE BLD STRIP.AUTO-MCNC: 209 MG/DL (ref 70–110)
GLUCOSE BLD STRIP.AUTO-MCNC: 227 MG/DL (ref 70–110)
GLUCOSE BLD STRIP.AUTO-MCNC: 261 MG/DL (ref 70–110)

## 2018-12-06 PROCEDURE — 74011250637 HC RX REV CODE- 250/637: Performed by: FAMILY MEDICINE

## 2018-12-06 PROCEDURE — 74011250637 HC RX REV CODE- 250/637: Performed by: PHYSICIAN ASSISTANT

## 2018-12-06 PROCEDURE — 82962 GLUCOSE BLOOD TEST: CPT

## 2018-12-06 PROCEDURE — 74011250637 HC RX REV CODE- 250/637: Performed by: HOSPITALIST

## 2018-12-06 PROCEDURE — 74011636637 HC RX REV CODE- 636/637: Performed by: HOSPITALIST

## 2018-12-06 PROCEDURE — 65660000000 HC RM CCU STEPDOWN

## 2018-12-06 RX ORDER — CLONIDINE HYDROCHLORIDE 0.1 MG/1
0.1 TABLET ORAL 2 TIMES DAILY
Status: DISCONTINUED | OUTPATIENT
Start: 2018-12-06 | End: 2018-12-08 | Stop reason: HOSPADM

## 2018-12-06 RX ORDER — DILTIAZEM HYDROCHLORIDE 120 MG/1
240 CAPSULE, COATED, EXTENDED RELEASE ORAL DAILY
Status: DISCONTINUED | OUTPATIENT
Start: 2018-12-07 | End: 2018-12-06

## 2018-12-06 RX ORDER — INSULIN GLARGINE 100 [IU]/ML
24 INJECTION, SOLUTION SUBCUTANEOUS
Status: DISCONTINUED | OUTPATIENT
Start: 2018-12-06 | End: 2018-12-08 | Stop reason: HOSPADM

## 2018-12-06 RX ORDER — DILTIAZEM HYDROCHLORIDE 120 MG/1
120 CAPSULE, COATED, EXTENDED RELEASE ORAL DAILY
Status: DISCONTINUED | OUTPATIENT
Start: 2018-12-07 | End: 2018-12-07

## 2018-12-06 RX ORDER — DILTIAZEM HYDROCHLORIDE 60 MG/1
60 TABLET, FILM COATED ORAL 4 TIMES DAILY
Status: DISPENSED | OUTPATIENT
Start: 2018-12-06 | End: 2018-12-06

## 2018-12-06 RX ADMIN — HYDROCHLOROTHIAZIDE 12.5 MG: 25 TABLET ORAL at 09:22

## 2018-12-06 RX ADMIN — INSULIN LISPRO 6 UNITS: 100 INJECTION, SOLUTION INTRAVENOUS; SUBCUTANEOUS at 21:30

## 2018-12-06 RX ADMIN — INSULIN LISPRO 9 UNITS: 100 INJECTION, SOLUTION INTRAVENOUS; SUBCUTANEOUS at 12:56

## 2018-12-06 RX ADMIN — APIXABAN 5 MG: 5 TABLET, FILM COATED ORAL at 09:25

## 2018-12-06 RX ADMIN — INSULIN LISPRO 3 UNITS: 100 INJECTION, SOLUTION INTRAVENOUS; SUBCUTANEOUS at 18:20

## 2018-12-06 RX ADMIN — METOPROLOL TARTRATE 100 MG: 50 TABLET ORAL at 09:25

## 2018-12-06 RX ADMIN — INSULIN GLARGINE 24 UNITS: 100 INJECTION, SOLUTION SUBCUTANEOUS at 21:31

## 2018-12-06 RX ADMIN — ATORVASTATIN CALCIUM 20 MG: 20 TABLET, FILM COATED ORAL at 09:23

## 2018-12-06 RX ADMIN — APIXABAN 5 MG: 5 TABLET, FILM COATED ORAL at 18:23

## 2018-12-06 RX ADMIN — INSULIN LISPRO 6 UNITS: 100 INJECTION, SOLUTION INTRAVENOUS; SUBCUTANEOUS at 09:26

## 2018-12-06 RX ADMIN — LORAZEPAM 0.5 MG: 0.5 TABLET ORAL at 09:30

## 2018-12-06 RX ADMIN — LORAZEPAM 0.5 MG: 0.5 TABLET ORAL at 19:14

## 2018-12-06 RX ADMIN — DILTIAZEM HYDROCHLORIDE 60 MG: 60 TABLET, FILM COATED ORAL at 05:23

## 2018-12-06 RX ADMIN — LOSARTAN POTASSIUM 100 MG: 50 TABLET ORAL at 09:25

## 2018-12-06 RX ADMIN — CLONIDINE HYDROCHLORIDE 0.1 MG: 0.1 TABLET ORAL at 18:14

## 2018-12-06 RX ADMIN — AMLODIPINE BESYLATE 10 MG: 5 TABLET ORAL at 09:23

## 2018-12-06 RX ADMIN — DILTIAZEM HYDROCHLORIDE 60 MG: 60 TABLET, FILM COATED ORAL at 18:14

## 2018-12-06 RX ADMIN — DILTIAZEM HYDROCHLORIDE 90 MG: 60 TABLET, FILM COATED ORAL at 09:20

## 2018-12-06 RX ADMIN — CLONIDINE HYDROCHLORIDE 0.2 MG: 0.1 TABLET ORAL at 09:20

## 2018-12-06 NOTE — PROGRESS NOTES
2000 Assumed care of pt after bedside report with pt lying in bed with HOB elevated. Pt on telemetry #11 and denotes NSR. AAO x 4. Lungs clear. Voiding as needed. Pt states feels \"drained\". Advised pt to call for nurse to assist to bathroom. Pt noted to have increased HR with activity. Denies c/o pain. 2145 Accucheck result 153. Nurse in to given scheduled meds of Lantus insulin 20 units and Lispro insulin 3 units as per sliding scale coverage as well as Cardizem po. Pt refuses meds at present. . States her BS is 153 and HR is normal. Nurse attempted to explain to pt reason for meds and pt wants to talk to someone about her meds. She states she feels \"washed out\" and believes her meds are causing this. Charge nurse Maryam Vasques advised and will talk to pt. 2220 Charge nurse Maryam Vasques RN in to see pt. Cardizem held and Lispro insulin held. Pt in no acute distress. 12/06/2018 0000 Resting without complaints after earlier meds. VSS. 0451 Resting at present without complaints. Pt noted to be in atrial fib on telemetry #11 with rate 101. 0524 Pt given PM dose of po Cardizem since pt has converted to uncontrolled afib and /74.

## 2018-12-06 NOTE — PROGRESS NOTES
Problem: Falls - Risk of 
Goal: *Absence of Falls Document Doe Kins Fall Risk and appropriate interventions in the flowsheet. Outcome: Progressing Towards Goal 
Fall Risk Interventions: 
Mobility Interventions: Patient to call before getting OOB Medication Interventions: Evaluate medications/consider consulting pharmacy

## 2018-12-06 NOTE — PROGRESS NOTES
[facility] - Progress Note Admit Date: 12/2/2018 Patient seen and examined independently. Attempting to adjust meds for rate control and her severe hypertension. Rate slowing today and so will adjust meds as noted. Patient on Eliquis for stroke prevention. She is not interested in THANG guided electrocardioversion. Agree with assessment and plan as noted below. Clyde Torre MD 
Assessment:  
 
 - Atrial fibrillation, new diagnosis. CHADS2-Vasc score 5 (HTN, DM, Female, Age +4) - Hypertension 
- Diabetes Mellitus 
- BRIELLE - Hx renal artery stenosis and angioplasty in 2001 Plan: - This morning afib rates 90's, currently rates 60's after discussion with RN 
- Discussed with patient rhythm control strategy as she is on multiple antihypertensives and AV glen blocking agents (High doses of Cardizem and Lopressor.) Risks and benefits of THANG/Cardioversion discussed with patient she declines rhythm control strategy at this time. - Eliquis for stroke prophylaxis - Stop short acting Cardizem after this evening's dose, Starting long acting 120 mg Cardizem tomorrow AM. Reducing Lopressor dose - Reducing Clonidine dose to .1 mg BID due to some recent marginal BP readings (103/67) Subjective: No new complaints. No chest pain or shortness of breath. Objective:  
  
Patient Vitals for the past 8 hrs: 
 Temp Pulse Resp BP SpO2  
12/06/18 1130 97.5 °F (36.4 °C) 79 18 103/67 92 % 12/06/18 0747 98.4 °F (36.9 °C) (!) 107 18 176/78 96 % Patient Vitals for the past 96 hrs: 
 Weight 12/06/18 0451 199 lb 11.8 oz (90.6 kg) 12/05/18 0555 199 lb 4.7 oz (90.4 kg) 12/04/18 1246 199 lb (90.3 kg) 12/03/18 0523 199 lb 15.3 oz (90.7 kg) 12/02/18 1525 200 lb (90.7 kg) Intake/Output Summary (Last 24 hours) at 12/6/2018 1334 Last data filed at 12/6/2018 1482 Gross per 24 hour Intake 240 ml Output 400 ml Net -160 ml Physical Exam: General:  alert, cooperative, no distress Neck:  nontender, no JVD Lungs:  clear to auscultation bilaterally Heart:  irregularly irregular rhythm Abdomen:  abdomen is soft without significant tenderness, masses, organomegaly or guarding Extremities:  extremities normal, atraumatic, no cyanosis or edema Data Review:  
 
Labs: Results:  
   
Chemistry No results for input(s): GLU, NA, K, CL, CO2, BUN, CREA, CA, MG, PHOS, AGAP, BUCR, TBIL, GPT, AP, TP, ALB, GLOB, AGRAT in the last 72 hours. CBC w/Diff No results for input(s): WBC, RBC, HGB, HCT, PLT, GRANS, LYMPH, EOS, HGBEXT, HCTEXT, PLTEXT in the last 72 hours. Cardiac Enzymes No results found for: CPK, CK, CKMMB, CKMB, RCK3, CKMBT, CKNDX, CKND1, NAS, TROPT, TROIQ, DANIEL, TROPT, TNIPOC, BNP, BNPP Coagulation No results for input(s): PTP, INR, APTT in the last 72 hours. No lab exists for component: INREXT Lipid Panel No results found for: CHOL, CHOLPOCT, CHOLX, CHLST, CHOLV, 689274, HDL, LDL, LDLC, DLDLP, 048268, VLDLC, VLDL, TGLX, TRIGL, TRIGP, TGLPOCT, CHHD, CHHDX  
BNP No results found for: BNP, BNPP, XBNPT Liver Enzymes No results for input(s): TP, ALB, TBIL, AP, SGOT, GPT in the last 72 hours. No lab exists for component: DBIL Digoxin Thyroid Studies Lab Results Component Value Date/Time TSH 3.81 (H) 05/15/2015 11:42 AM  
    
 
Signed By: David Costa. Sami Steinberg PA-C December 6, 2018

## 2018-12-06 NOTE — ROUTINE PROCESS
0700 Bedside report given to [facility] (oncoming nurse) per Hulon Cooks (offgoing nurse) utilizing SBAR, MAR, Kardex, EKG interpretation with rate and rhythm.

## 2018-12-06 NOTE — MANAGEMENT PLAN
Discharge/Transition Planning CM spoke with patient and plan remains home when medically stable Shaheen Marcos RN BSN Outcomes Manager Pager # [phone]

## 2018-12-06 NOTE — PROGRESS NOTES
In chart as charge nurse to assist with medication pass 0524  Pt converted to afib hr 's bp 124/74. Pm dosage of cardizem that was held given at this time.

## 2018-12-07 VITALS
SYSTOLIC BLOOD PRESSURE: 137 MMHG | OXYGEN SATURATION: 98 % | HEIGHT: 63 IN | BODY MASS INDEX: 35.39 KG/M2 | DIASTOLIC BLOOD PRESSURE: 85 MMHG | RESPIRATION RATE: 18 BRPM | TEMPERATURE: 98.1 F | HEART RATE: 81 BPM | WEIGHT: 199.74 LBS

## 2018-12-07 LAB
GLUCOSE BLD STRIP.AUTO-MCNC: 121 MG/DL (ref 70–110)
GLUCOSE BLD STRIP.AUTO-MCNC: 138 MG/DL (ref 70–110)
GLUCOSE BLD STRIP.AUTO-MCNC: 203 MG/DL (ref 70–110)
GLUCOSE BLD STRIP.AUTO-MCNC: 239 MG/DL (ref 70–110)

## 2018-12-07 PROCEDURE — 74011636637 HC RX REV CODE- 636/637: Performed by: HOSPITALIST

## 2018-12-07 PROCEDURE — 74011250637 HC RX REV CODE- 250/637: Performed by: FAMILY MEDICINE

## 2018-12-07 PROCEDURE — 74011250636 HC RX REV CODE- 250/636: Performed by: INTERNAL MEDICINE

## 2018-12-07 PROCEDURE — 74011000250 HC RX REV CODE- 250: Performed by: PHYSICIAN ASSISTANT

## 2018-12-07 PROCEDURE — 82962 GLUCOSE BLOOD TEST: CPT

## 2018-12-07 PROCEDURE — 74011250637 HC RX REV CODE- 250/637: Performed by: HOSPITALIST

## 2018-12-07 PROCEDURE — 74011250637 HC RX REV CODE- 250/637: Performed by: PHYSICIAN ASSISTANT

## 2018-12-07 RX ORDER — FACIAL-BODY WIPES
10 EACH TOPICAL ONCE
Status: COMPLETED | OUTPATIENT
Start: 2018-12-07 | End: 2018-12-07

## 2018-12-07 RX ORDER — AMOXICILLIN 250 MG
2 CAPSULE ORAL DAILY
Status: DISCONTINUED | OUTPATIENT
Start: 2018-12-07 | End: 2018-12-08 | Stop reason: HOSPADM

## 2018-12-07 RX ORDER — LORAZEPAM 0.5 MG/1
0.5 TABLET ORAL
Qty: 30 TAB | Refills: 0 | Status: SHIPPED
Start: 2018-12-07

## 2018-12-07 RX ORDER — DILTIAZEM HYDROCHLORIDE 120 MG/1
120 CAPSULE, COATED, EXTENDED RELEASE ORAL DAILY
Qty: 30 CAP | Refills: 0 | Status: SHIPPED | OUTPATIENT
Start: 2018-12-07 | End: 2019-11-15

## 2018-12-07 RX ORDER — METOPROLOL TARTRATE 75 MG/1
75 TABLET, FILM COATED ORAL EVERY 12 HOURS
Qty: 60 TAB | Refills: 0 | Status: SHIPPED | OUTPATIENT
Start: 2018-12-07

## 2018-12-07 RX ORDER — DILTIAZEM HYDROCHLORIDE 120 MG/1
240 CAPSULE, COATED, EXTENDED RELEASE ORAL DAILY
Status: DISCONTINUED | OUTPATIENT
Start: 2018-12-08 | End: 2018-12-08 | Stop reason: HOSPADM

## 2018-12-07 RX ORDER — DIGOXIN 0.25 MG/ML
250 INJECTION INTRAMUSCULAR; INTRAVENOUS
Status: COMPLETED | OUTPATIENT
Start: 2018-12-07 | End: 2018-12-07

## 2018-12-07 RX ORDER — INSULIN GLARGINE 100 [IU]/ML
INJECTION, SOLUTION SUBCUTANEOUS
Qty: 2 VIAL | Refills: 0 | Status: SHIPPED | OUTPATIENT
Start: 2018-12-07

## 2018-12-07 RX ADMIN — LORAZEPAM 0.5 MG: 0.5 TABLET ORAL at 00:31

## 2018-12-07 RX ADMIN — AMLODIPINE BESYLATE 10 MG: 5 TABLET ORAL at 09:03

## 2018-12-07 RX ADMIN — METOPROLOL TARTRATE 75 MG: 50 TABLET ORAL at 22:47

## 2018-12-07 RX ADMIN — DOCUSATE SODIUM AND SENNOSIDES 2 TABLET: 8.6; 5 TABLET, FILM COATED ORAL at 17:24

## 2018-12-07 RX ADMIN — INSULIN GLARGINE 24 UNITS: 100 INJECTION, SOLUTION SUBCUTANEOUS at 22:48

## 2018-12-07 RX ADMIN — INSULIN LISPRO 6 UNITS: 100 INJECTION, SOLUTION INTRAVENOUS; SUBCUTANEOUS at 12:38

## 2018-12-07 RX ADMIN — METOPROLOL TARTRATE 5 MG: 5 INJECTION, SOLUTION INTRAVENOUS at 05:56

## 2018-12-07 RX ADMIN — CLONIDINE HYDROCHLORIDE 0.1 MG: 0.1 TABLET ORAL at 17:24

## 2018-12-07 RX ADMIN — DILTIAZEM HYDROCHLORIDE 120 MG: 120 CAPSULE, COATED, EXTENDED RELEASE ORAL at 09:03

## 2018-12-07 RX ADMIN — METOPROLOL TARTRATE 75 MG: 50 TABLET ORAL at 09:02

## 2018-12-07 RX ADMIN — LORAZEPAM 0.5 MG: 0.5 TABLET ORAL at 09:20

## 2018-12-07 RX ADMIN — LOSARTAN POTASSIUM 100 MG: 50 TABLET ORAL at 09:00

## 2018-12-07 RX ADMIN — HYDROCHLOROTHIAZIDE 12.5 MG: 25 TABLET ORAL at 09:05

## 2018-12-07 RX ADMIN — APIXABAN 5 MG: 5 TABLET, FILM COATED ORAL at 17:24

## 2018-12-07 RX ADMIN — APIXABAN 5 MG: 5 TABLET, FILM COATED ORAL at 09:02

## 2018-12-07 RX ADMIN — DIGOXIN 250 MCG: 250 INJECTION, SOLUTION INTRAMUSCULAR; INTRAVENOUS; PARENTERAL at 11:28

## 2018-12-07 RX ADMIN — ATORVASTATIN CALCIUM 20 MG: 20 TABLET, FILM COATED ORAL at 09:02

## 2018-12-07 RX ADMIN — INSULIN LISPRO 6 UNITS: 100 INJECTION, SOLUTION INTRAVENOUS; SUBCUTANEOUS at 17:22

## 2018-12-07 RX ADMIN — BISACODYL 10 MG: 10 SUPPOSITORY RECTAL at 10:13

## 2018-12-07 RX ADMIN — CLONIDINE HYDROCHLORIDE 0.1 MG: 0.1 TABLET ORAL at 09:02

## 2018-12-07 NOTE — PROGRESS NOTES
Progress Note Patient: Krys Rivas               Sex: female          DOA: 12/2/2018 YOB: 1945      Age:  74 y.o.        LOS:  LOS: 5 days CHIEF COMPLAINT:  A fib with RVR Subjective:  
 
Patient upset because she wants to be transferred to [facility] Objective:  
  
Visit Vitals /82 (BP 1 Location: Left arm, BP Patient Position: At rest) Pulse (!) 130 Temp 97.8 °F (36.6 °C) Resp 18 Ht 5' 3\" (1.6 m) Wt 90.6 kg (199 lb 11.8 oz) SpO2 92% BMI 35.38 kg/m² Physical Exam: 
Gen:  Patient is in no distress. No complaint HEENT and Neck:  PERRL, No cervical tenderness or masses Lungs:  Clear bilaterally. No rales, no wheeze. Normal effort. Heart:  Tachycardic with irregular rhythm. No murmur or gallop. No JVD. No edema. Abdomen:  Soft, non tender, no masses, no Hepatosplenomegally Extremities:  No digital clubbing, no cyanosis Neuro:  Alert and oriented, Normal affect, Cranial nerves intact, No sensory deficits Lab/Data Reviewed: All lab results for the last 24 hours reviewed. Assessment/Plan Principal Problem: 
  Atrial fibrillation with RVR ([facility] (12/2/2018) Active Problems: 
  HTN (hypertension) (12/2/2018) DM (diabetes mellitus) ([facility] (12/2/2018) Plan: 
Patient remains tachycardic this am even though she had good control from midday yesterday to early this am.  She has been extremely difficult in last 48 hrs to work with I have fully explained cardioversion to her as well and medical management , the difference between rate and rhythm control. The possibility of cardioversion both succeeding or failing (immediately or overtime). As per Dr. Jemma Dexter she was not interested in cardioversion.    
 
She now wants transfer to Dr. Julio Preston her cardiologist however this is not based on necessity as there are no services available there that are not here that will require patient to set up transfer by finding an accepting admitting physician if an admitting physician calls me or transfer center calls me I will be more than happy to work on this process. I have not yet received a call from the physician and I have called the transfer center and no transfer has been started by franco per their records. Afib RVR Attempting improvement with dig Continuing lopressor and cardizem Continue BP meds Losartan Clonidine 
hctz 
norvasc

## 2018-12-07 NOTE — PROGRESS NOTES
Problem: Falls - Risk of 
Goal: *Absence of Falls Document Monica Early Fall Risk and appropriate interventions in the flowsheet. Outcome: Progressing Towards Goal 
Fall Risk Interventions: 
Mobility Interventions: Patient to call before getting OOB Medication Interventions: Evaluate medications/consider consulting pharmacy

## 2018-12-07 NOTE — PROGRESS NOTES
2000-no signs of distress. Ordered meds given throughout shift. Notified MD at 0600 of patient's elevated HR after dose of prn lopressor. MD verbalized no new orders and stated to continue patient's scheduled bp meds at 0900. Passed HR/morning med info to day nurse. Bedside shift change report given to [address] [place] [address] (oncoming nurse) by Linden Pappas (offgoing nurse). Report included the following information SBAR.

## 2018-12-07 NOTE — CDMP QUERY
The medical record reflects the following: 
 
Risk: 73 yo female admitted with Atrial fib Clinical Indicators:  EKG on admission  A.fib with RVR 
 
12/5  Cardiology note HR and BP still suboptimals Continue BB. Agree to start low dose short acting cardizem 30 mg every 6 hours for now., Continue apixaban Treatment: Cardiology consult, Cardizem bolus and drip, apixaban ,  telemetry monitoring, serial coagulation labs When all testing and treatment has been completed, please clarify if  patient is being treated/managed for: 
 
=>Persistent: the rapid heart rate begins on its own and may continue for more than a week; it may stop on its own or treatment/intervention may be required =>Chronic/Permanent: the rapid heart rate begins on its own but will not or cannot stop on its own without treatment or intervention =>Other Explanation of clinical findings =>Unable to Determine (no explanation of clinical findings) Please clarify and document your clinical opinion in the progress notes and discharge summary including the definitive and/or presumptive diagnosis, (suspected or probable), related to the above clinical findings. Please include clinical findings supporting your diagnosis. If you DECLINE this query or would like to communicate with [facility], please utilize the \"[employer] message box\" at the TOP of the Progress Note on the right. Thank you, 
Jesus Austin RN [facility]   [phone]

## 2018-12-07 NOTE — ROUTINE PROCESS
0840-Assessment completed, call bell within reach, no distress noted, voiced no complaints at this time. 0920-am due medications given. 1230-no change in condition, no distress noted,  Voiced no complaints at this time. 1400-resting quietly in bed. 1500-patient states she wants to transfer to [facility], inform Dr. Carisa Nash, per Dr. Carisa Nash, the MD that is willing to accept her should speak with Dr. Carisa Nash, gave the patient Dr. Mary Pat office number. 1630-no change in condition, no distress noted. 1810-pm due medications given. 2000-Bedside and Verbal shift change report given to Nai Woodward (oncoming nurse) by Chinyere Bonilla (offgoing nurse). Report included the following information MAR and Recent Results.

## 2018-12-07 NOTE — MANAGEMENT PLAN
Discharge/Transition Planning Care Management following. Pt plan remains home. Pt upset she is not being followed by her Cardiologist Dr Deanne Moran at [facility]. Recommend she call office and see if Dr Deanne Moran would transfer her. Not likely as treatment pt needs is available at current hospital. Pt discharge cancelled today as pt remains in a-fibb. Cardiology recommends Cardioversion, but pt stating she doesn't want this procedure. Aki Romero RN BSN Outcomes Manager Pager # [phone]

## 2018-12-07 NOTE — PROGRESS NOTES
Problem: Falls - Risk of 
Goal: *Absence of Falls Document Davian Umana Fall Risk and appropriate interventions in the flowsheet. Outcome: Progressing Towards Goal 
Fall Risk Interventions: 
Mobility Interventions: Patient to call before getting OOB Medication Interventions: Patient to call before getting OOB

## 2018-12-07 NOTE — DISCHARGE SUMMARY
Discharge Summary    Patient: Carmen Woods               Sex: female          DOA: 12/2/2018         YOB: 1945      Age:  74 y.o.        LOS:  LOS: 5 days                Admit Date: 12/2/2018    Discharge Date: 12/7/2018    Discharge Medications:     Current Discharge Medication List      START taking these medications    Details   apixaban (ELIQUIS) 5 mg tablet Take 1 Tab by mouth two (2) times a day. Qty: 60 Tab, Refills: 0      dilTIAZem CD (CARDIZEM CD) 120 mg ER capsule Take 1 Cap by mouth daily. Qty: 30 Cap, Refills: 0      insulin glargine (LANTUS) 100 unit/mL injection Take 24 Units daily  Qty: 2 Vial, Refills: 0      metoprolol tartrate 75 mg tab Take 75 mg by mouth every twelve (12) hours. Qty: 60 Tab, Refills: 0         CONTINUE these medications which have CHANGED    Details   LORazepam (ATIVAN) 0.5 mg tablet Take 1 Tab by mouth three (3) times daily as needed. Max Daily Amount: 1.5 mg.  Qty: 30 Tab, Refills: 0    Associated Diagnoses: Anxiety         CONTINUE these medications which have NOT CHANGED    Details   amLODIPine (NORVASC) 5 mg tablet Take 10 mg by mouth daily. cloNIDine HCl (CATAPRES) 0.2 mg tablet Take 0.2 mg by mouth two (2) times a day. atorvastatin (LIPITOR) 20 mg tablet Take  by mouth daily. albuterol (PROVENTIL HFA) 90 mcg/actuation inhaler Take  by inhalation. candesartan-hydrochlorothiazide (ATACAND HCT) 32-12.5 mg per tablet Take 1 Tab by mouth daily. aspirin 81 mg tablet Take 81 mg by mouth. DIPHENHYDRAMINE HCL (BENADRYL ALLERGY PO) Take  by mouth.          STOP taking these medications       minoxidil (LONITEN) 10 mg tablet Comments:   Reason for Stopping:         glimepiride (AMARYL) 4 mg tablet Comments:   Reason for Stopping:         metFORMIN (GLUCOPHAGE) 1,000 mg tablet Comments:   Reason for Stopping:         atenolol (TENORMIN) 100 mg tablet Comments:   Reason for Stopping:         milk thistle 300 mg Cap Comments: Reason for Stopping: Follow-up: per receiving facility    Discharge Condition: Stable    Activity: Activity as tolerated    Diet: Cardiac Diet    Labs:  Labs: Results:       Chemistry No results for input(s): GLU, NA, K, CL, CO2, BUN, CREA, CA, AGAP, BUCR, TBIL, GPT, AP, TP, ALB, GLOB, AGRAT in the last 72 hours. CBC w/Diff No results for input(s): WBC, RBC, HGB, HCT, PLT, GRANS, LYMPH, EOS, HGBEXT, HCTEXT, PLTEXT in the last 72 hours. Cardiac Enzymes No results for input(s): CPK, CKND1, NAS in the last 72 hours. No lab exists for component: CKRMB, TROIP   Coagulation No results for input(s): PTP, INR, APTT in the last 72 hours. No lab exists for component: INREXT    Lipid Panel No results found for: CHOL, CHOLPOCT, CHOLX, CHLST, CHOLV, 626909, HDL, LDL, LDLC, DLDLP, 033489, VLDLC, VLDL, TGLX, TRIGL, TRIGP, TGLPOCT, CHHD, CHHDX   BNP No results for input(s): BNPP in the last 72 hours. Liver Enzymes No results for input(s): TP, ALB, TBIL, AP, SGOT, GPT in the last 72 hours. No lab exists for component: DBIL   Thyroid Studies Lab Results   Component Value Date/Time    TSH 3.81 (H) 05/15/2015 11:42 AM          Imaging:      Echo    · Estimated left ventricular ejection fraction is 66 - 70%. Visually measured ejection fraction. Left ventricular moderate concentric hypertrophy. Left ventricular mid-cavity obstruction. Normal left ventricular wall motion, no regional wall motion abnormality noted. · Left atrial cavity size is moderately dilated. · Right ventricle not well visualized. · Mild aortic valve sclerosis with no evidence of reduced excursion. · Mitral valve non-specific thickening. Mild mitral annular calcification. Mild mitral valve regurgitation. · Mild tricuspid valve regurgitation is present. There is no evidence of pulmonary hypertension.   · Mildly elevated central venous pressure (5-10 mmHg); IVC diameter is less than 21 mm and collapses less than 50% with respiration. Consults: Cardiology    Treatment Team: Treatment Team: Attending Provider: Graciela Poe MD; Consulting Provider: Monica Melissa; Hospitalist: Chely Catalan MD; Consulting Provider: Graciela Poe MD; Utilization Review: Juan A Giron RN; Care Manager: Malena Tobar RN    Significant Diagnostic Studies: labs:   Recent Results (from the past 24 hour(s))   GLUCOSE, POC    Collection Time: 12/06/18  4:47 PM   Result Value Ref Range    Glucose (POC) 168 (H) 70 - 110 mg/dL   GLUCOSE, POC    Collection Time: 12/06/18  8:58 PM   Result Value Ref Range    Glucose (POC) 209 (H) 70 - 110 mg/dL   GLUCOSE, POC    Collection Time: 12/07/18  8:06 AM   Result Value Ref Range    Glucose (POC) 138 (H) 70 - 110 mg/dL   GLUCOSE, POC    Collection Time: 12/07/18 11:36 AM   Result Value Ref Range    Glucose (POC) 203 (H) 70 - 110 mg/dL         Discharge diagnoses:    Problem List as of 12/7/2018 Date Reviewed: 7/1/2014          Codes Class Noted - Resolved    HTN (hypertension) ICD-10-CM: I10  ICD-9-CM: 401.9  12/2/2018 - Present        DM (diabetes mellitus) ([facility] ICD-10-CM: E11.9  ICD-9-CM: 250.00  12/2/2018 - Present        * (Principal) Atrial fibrillation with RVR ([facility] ICD-10-CM: I48.91  ICD-9-CM: 427.31  12/2/2018 - Present        Family history of colon cancer requiring screening colonoscopy ICD-10-CM: Z80.0  ICD-9-CM: V16.0  7/1/2014 - Present        Abscess of back ICD-10-CM: L02.212  ICD-9-CM: 682.2  10/5/2012 - Present            Hospital Course:   Major issues addressed during hospitalization outlined  below. Bartolome Sparrow is a 74y.o. year old female who presents with  Palpitations and SOB that started today. 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 She checked her HR it was in 130's/140's . She went to urgent care and was sent here.   She has hx of pSVT as per sentara chart but doesn't appear to have been on a blood thinner previously. Given she is back in afib RVR will admit for med titration and start of eliquis. Patient was continued on anticoagulation. She was uptitrated on metoprolol to 75 q8 hrs then cardizem was started up to max dose at one point in hospitalization. Cardiology was consulted and recommended cardioversion patient refused and would like transfer to [address] [place] [address] to see Dr. Kam Reyes her usual cardiologist.  Please note a1c 10.4 . .. Will need to dc on insulin.       Mart Ferrari MD  December 7, 2018        Total time spent 40 minutes

## 2018-12-07 NOTE — PROGRESS NOTES
[facility] - Progress Note Admit Date: 12/2/2018 Assessment:  
 
 - Atrial fibrillation, new diagnosis. CHADS2-Vasc score 5 (HTN, DM, Female, Age +4) - Hypertension 
- Diabetes Mellitus 
- BRIELLE - Hx renal artery stenosis and angioplasty in 2001 Plan:  
 
- -110s - Will give IV digoxin 0.25 mg once now. - Eliquis for stroke prophylaxis - Continue current BB and CCB dose. Just adjusted yesterday.  
- DW patient in detail about management of a.fib in detail. Rate control vs. Rhythm control strategies were discussed in detail. Chemical vs electrical cardioversion discussed. Risk, benefit, alternatives and complications of each strategies discussed in detail as well. I recommended that she should consider THANG guided cardioversion for rhythm control strategy as HR has been suboptimally controlled but she does not want to consider that. She only want medical management rate control strategy . She infact wants to be transferred to [facility] to see Primary cardiologist DR. Espino. I have told her that Janet Ramirez will be coming to discuss that option. I have talked to Janet Ramirez and he is going to discuss that with her. Subjective: No new complaints. No chest pain or shortness of breath. Wantts to go to [facility] 
 
Objective:  
  
Patient Vitals for the past 8 hrs: 
 Temp Pulse Resp BP SpO2  
12/07/18 0900 97.8 °F (36.6 °C) (!) 130 18 122/82 92 % 12/07/18 0346 97.6 °F (36.4 °C) 90 18 141/62 96 % Patient Vitals for the past 96 hrs: 
 Weight 12/06/18 0451 199 lb 11.8 oz (90.6 kg) 12/05/18 0555 199 lb 4.7 oz (90.4 kg) 12/04/18 1246 199 lb (90.3 kg) Intake/Output Summary (Last 24 hours) at 12/7/2018 1048 Last data filed at 12/6/2018 1338 Gross per 24 hour Intake 250 ml Output  Net 250 ml Physical Exam: 
General:  alert, cooperative, no distress Neck:  nontender, no JVD Lungs:  clear to auscultation bilaterally Heart:  irregularly irregular rhythm Abdomen:  abdomen is soft without significant tenderness, masses, organomegaly or guarding Extremities:  extremities normal, atraumatic, no cyanosis or edema Data Review:  
 
Labs: Results:  
   
Chemistry No results for input(s): GLU, NA, K, CL, CO2, BUN, CREA, CA, MG, PHOS, AGAP, BUCR, TBIL, GPT, AP, TP, ALB, GLOB, AGRAT in the last 72 hours. CBC w/Diff No results for input(s): WBC, RBC, HGB, HCT, PLT, GRANS, LYMPH, EOS, HGBEXT, HCTEXT, PLTEXT, HGBEXT, HCTEXT, PLTEXT in the last 72 hours. Cardiac Enzymes No results found for: CPK, CK, CKMMB, CKMB, RCK3, CKMBT, CKNDX, CKND1, NAS, TROPT, TROIQ, DANIEL, TROPT, TNIPOC, BNP, BNPP Coagulation No results for input(s): PTP, INR, APTT in the last 72 hours. No lab exists for component: INREXT, INREXT Lipid Panel No results found for: CHOL, CHOLPOCT, CHOLX, CHLST, CHOLV, 880436, HDL, LDL, LDLC, DLDLP, 621180, VLDLC, VLDL, TGLX, TRIGL, TRIGP, TGLPOCT, CHHD, CHHDX  
BNP No results found for: BNP, BNPP, XBNPT Liver Enzymes No results for input(s): TP, ALB, TBIL, AP, SGOT, GPT in the last 72 hours. No lab exists for component: DBIL Digoxin Thyroid Studies Lab Results Component Value Date/Time TSH 3.81 (H) 05/15/2015 11:42 AM  
    
 
Signed By: Pauline Oliver MD   
 December 7, 2018

## 2018-12-08 NOTE — PROGRESS NOTES
2000-no signs of distress. Transfer set up for patient transfer to [facility] at 2300.  
 
2332-emtala completed. Patient placed on stretcher. No signs of distress. Patient off the floor by [id number].

## 2018-12-08 NOTE — PROGRESS NOTES
Internal Medicine Progress Note Patient's Name: Caro Renae Admit Date: 12/2/2018 Length of Stay: 5 Assessment/Plan Active Hospital Problems Diagnosis Date Noted  HTN (hypertension) 12/02/2018  DM (diabetes mellitus) ([facility] 12/02/2018  Atrial fibrillation with RVR ([email or web address] 12/02/2018  
 
 
- Cont acceptable home medications for chronic conditions  
- DVT protocol and GI prophylaxis reviewed I have personally reviewed all pertinent labs and films that have officially resulted over the last 24 hours. I have personally checked for all pending labs that are awaiting final results. Subjective Pt s/e @ bedside Patient is clinically stable at the time of transfer. Patient is transferred at her request for cardioversion at a different facility where her cardiologist is. Denies chest pain. EMTALA form completed. Objective Visit Vitals /80 (BP 1 Location: Right arm, BP Patient Position: At rest) Pulse 91 Temp 97.4 °F (36.3 °C) Resp 18 Ht 5' 3\" (1.6 m) Wt 90.6 kg (199 lb 11.8 oz) SpO2 97% BMI 35.38 kg/m² Physical Exam: 
General Appearance: acute distress(-),   mental status - baseline Lungs: normal respiratory effort,   wheezes(-),   rales(-),   rhonchi(-) Chest: retractions(-),   use of accessory muscles(-) CV: AFib,   no m/r/g Abdomen: soft,   tenderness(-),   bowel sounds(+),   peritoneal signs(-), Extremities: no cyanosis,  peripheral edema(-) Neuro: No focal deficits,   motor/sensory intact,   tremors(-) Lab/Data Reviewed: All lab results for the last 24 hours reviewed. Imaging Reviewed: 
No results found. Medications Reviewed: 
Current Facility-Administered Medications Medication Dose Route Frequency  [START ON 12/8/2018] dilTIAZem CD (CARDIZEM CD) capsule 240 mg  240 mg Oral DAILY  senna-docusate (PERICOLACE) 8.6-50 mg per tablet 2 Tab  2 Tab Oral DAILY  insulin glargine (LANTUS) injection 24 Units  24 Units SubCUTAneous QHS  cloNIDine HCl (CATAPRES) tablet 0.1 mg  0.1 mg Oral BID  metoprolol tartrate (LOPRESSOR) tablet 75 mg  75 mg Oral Q12H  hydroCHLOROthiazide (HYDRODIURIL) tablet 12.5 mg  12.5 mg Oral DAILY  losartan (COZAAR) tablet 100 mg  100 mg Oral DAILY  LORazepam (ATIVAN) tablet 0.5 mg  0.5 mg Oral TID PRN  
 amLODIPine (NORVASC) tablet 10 mg  10 mg Oral DAILY  metoprolol (LOPRESSOR) injection 5 mg  5 mg IntraVENous Q6H PRN  
 atorvastatin (LIPITOR) tablet 20 mg  20 mg Oral DAILY  acetaminophen (TYLENOL) tablet 650 mg  650 mg Oral Q4H PRN  
 ondansetron (ZOFRAN ODT) tablet 4 mg  4 mg Oral Q4H PRN  
 insulin lispro (HUMALOG) injection   SubCUTAneous AC&HS  
 glucose chewable tablet 16 g  4 Tab Oral PRN  
 glucagon (GLUCAGEN) injection 1 mg  1 mg IntraMUSCular PRN  
 dextrose (D50) infusion 12.5-25 g  25-50 mL IntraVENous PRN  
 apixaban (ELIQUIS) tablet 5 mg  5 mg Oral BID Dwayne Cardona MD 
12/7/2018 
10:09 PM

## 2018-12-08 NOTE — PROGRESS NOTES
Problem: Falls - Risk of 
Goal: *Absence of Falls Document Bubba Nicoleh Fall Risk and appropriate interventions in the flowsheet. Outcome: Progressing Towards Goal 
Fall Risk Interventions: 
Mobility Interventions: Patient to call before getting OOB Medication Interventions: Patient to call before getting OOB

## 2018-12-08 NOTE — PROGRESS NOTES
2118  Received call from [facility] transfer center spoke with miley. Pt has room available in [facility]504. Admitting MD Ava Rowe. 2125  Supervisor made aware of transfer. 2140  Call placed to [facility] transport. Spoke with Tiffany Ravi. Pt will be self pay must have guaranteed form of payment. 2145  Pt on phone with medical transport. 2150  Transport set for will call. 1 Dr Eduard Chavarria to unit to see pt, fill out emtala and discharge note. 2217  Call to [facility], transport set for 2300.

## 2018-12-08 NOTE — ROUTINE PROCESS
0800-Assessment completed, call bell within reach, no distress noted, voiced no complaints at this time. 0830-DrGeorgi Abhijit in to see patient. 0905-am due medications given. States she wants to see about getting transferred to [facility]. 0930-informed Dr. Wendi Frye about patient's wishes of wanting to go to [facility]. He said he would go and speak with her. patient requesting something for a BM. 1030-Dulcolax supp. Given as ordered. 1230-no change in condition, no distress noted. Patient want to talk with patient advocated. Manager made aware by Stephany Welch. 1500-wants something else for a BM, states she has not had a BM yet. Will Call Dr. Wendi Frye. 1630-no change in condition. 1730-pericolace given as ordered for BM. Other due medications given. 1915-Bedside and Verbal shift change report given to Maria E Ludwig (oncoming nurse) by Carroll Jordan (offgoing nurse). Report included the following information SBAR, MAR and Recent Results.

## 2019-06-11 ENCOUNTER — HOSPITAL ENCOUNTER (OUTPATIENT)
Dept: MAMMOGRAPHY | Age: 74
Discharge: HOME | End: 2019-06-11
Payer: MEDICARE

## 2019-06-11 DIAGNOSIS — Z12.39 BREAST SCREENING, UNSPECIFIED: ICD-10-CM

## 2019-06-11 PROCEDURE — 77063 BREAST TOMOSYNTHESIS BI: CPT

## 2019-11-15 RX ORDER — METFORMIN HYDROCHLORIDE 1000 MG/1
TABLET ORAL
COMMUNITY

## 2019-11-15 RX ORDER — GLIMEPIRIDE 4 MG/1
TABLET ORAL
COMMUNITY

## 2019-11-15 NOTE — PERIOP NOTES
PAT - SURGICAL PRE-ADMISSION INSTRUCTIONS    NAME:  Indio Srinivasan                                                          TODAY'S DATE:  11/15/2019    SURGERY DATE:  11/18/2019                                  SURGERY ARRIVAL TIME:   0700    1. Do NOT eat or drink anything, including candy or gum, after MIDNIGHT on 11/17/2019 , unless you have specific instructions from your Surgeon or Anesthesia Provider to do so. 2. No smoking on the day of surgery. 3. No alcohol 24 hours prior to the day of surgery. 4. No recreational drugs for one week prior to the day of surgery. 5. Leave all valuables, including money/purse, at home. 6. Remove all jewelry, nail polish, makeup (including mascara); no lotions, powders, deodorant, or perfume/cologne/after shave. 7. Glasses/Contact lenses and Dentures may be worn to the hospital.  They will be removed prior to surgery. 8. Call your doctor if symptoms of a cold or illness develop within 24 ours prior to surgery. 9. AN ADULT MUST DRIVE YOU HOME AFTER OUTPATIENT SURGERY. 10. If you are having an OUTPATIENT procedure, please make arrangements for a responsible adult to be with you for 24 hours after your surgery. 11. If you are admitted to the hospital, you will be assigned to a bed after surgery is complete. Normally a family member will not be able to see you until you are in your assigned bed. 15. Family is encouraged to accompany you to the hospital.  We ask visitors in the treatment area to be limited to ONE person at a time to ensure patient privacy. EXCEPTIONS WILL BE MADE AS NEEDED. 15. Children under 12 are discouraged from entering the treatment area and need to be supervised by an adult when in the waiting room. Special Instructions: Take these medications the morning of surgery with a sip of water:  Metoprolol, Clonidine, Amlodipine. She will check BP as usual, HOLD oral diabetic medication on the MORNING OF surgery. , HOLD metformin/glucophage dose starting the [address] [facility] [address] the day of surgery. , Follow physician instructions about insulin. If NO instructions were given, take your usual dose of BASAL insulin the night BEFORE surgery. , Complete bowel prep per MD instructions., STOP anticoagulants AT LEAST 1 WEEK PRIOR to your surgery or, follow other MD instructions: Will stop Eliquis on Saturday per MD instructions  She was told to take 1/2 dose of Amaryl on Sunday and hold her Insulin on Sunday and Monday        These surgical instructions were reviewed with Lakshmi Love during the PAT phone call. Directions: On the morning of surgery, please go to the [facility]. Enter the building from the [facility] entrance, 1st floor (next to the Emergency Room entrance). Take the elevator to the 2nd floor. Sign in at the Registration Desk.     If you have any questions and/or concerns, please do not hesitate to call:  (Prior to the day of surgery)  [facility] unit:  [phone]  (Day of surgery)  [facility] unit:  [phone]

## 2019-11-18 ENCOUNTER — HOSPITAL ENCOUNTER (OUTPATIENT)
Age: 74
Setting detail: OUTPATIENT SURGERY
Discharge: HOME | End: 2019-11-18
Attending: INTERNAL MEDICINE | Admitting: INTERNAL MEDICINE
Payer: MEDICARE

## 2019-11-18 VITALS
RESPIRATION RATE: 14 BRPM | WEIGHT: 211 LBS | TEMPERATURE: 97.9 F | OXYGEN SATURATION: 94 % | HEIGHT: 63 IN | BODY MASS INDEX: 37.39 KG/M2 | DIASTOLIC BLOOD PRESSURE: 56 MMHG | HEART RATE: 62 BPM | SYSTOLIC BLOOD PRESSURE: 120 MMHG

## 2019-11-18 PROBLEM — Z86.010 ENCOUNTER FOR COLONOSCOPY DUE TO HISTORY OF ADENOMATOUS COLONIC POLYPS: Status: ACTIVE | Noted: 2019-11-18

## 2019-11-18 PROBLEM — Z12.11 ENCOUNTER FOR COLONOSCOPY DUE TO HISTORY OF ADENOMATOUS COLONIC POLYPS: Status: ACTIVE | Noted: 2019-11-18

## 2019-11-18 LAB
GLUCOSE BLD STRIP.AUTO-MCNC: 242 MG/DL (ref 70–110)
GLUCOSE BLD STRIP.AUTO-MCNC: 260 MG/DL (ref 70–110)

## 2019-11-18 PROCEDURE — 74011250636 HC RX REV CODE- 250/636: Performed by: NURSE ANESTHETIST, CERTIFIED REGISTERED

## 2019-11-18 PROCEDURE — G0500 MOD SEDAT ENDO SERVICE >5YRS: HCPCS | Performed by: INTERNAL MEDICINE

## 2019-11-18 PROCEDURE — 82962 GLUCOSE BLOOD TEST: CPT

## 2019-11-18 PROCEDURE — 74011636637 HC RX REV CODE- 636/637: Performed by: NURSE ANESTHETIST, CERTIFIED REGISTERED

## 2019-11-18 PROCEDURE — 77030038604 HC SNR ENDO EXACTO USEN -B: Performed by: INTERNAL MEDICINE

## 2019-11-18 PROCEDURE — 88305 TISSUE EXAM BY PATHOLOGIST: CPT

## 2019-11-18 PROCEDURE — 77030010936 HC CLP LIG BSC -C: Performed by: INTERNAL MEDICINE

## 2019-11-18 PROCEDURE — 74011250636 HC RX REV CODE- 250/636: Performed by: INTERNAL MEDICINE

## 2019-11-18 PROCEDURE — 76040000019: Performed by: INTERNAL MEDICINE

## 2019-11-18 PROCEDURE — 77030037186 HC VLV ENDOSC STRL DEFENDO DISP MVAT -A: Performed by: INTERNAL MEDICINE

## 2019-11-18 RX ORDER — SODIUM CHLORIDE 0.9 % (FLUSH) 0.9 %
5-40 SYRINGE (ML) INJECTION EVERY 8 HOURS
Status: CANCELLED | OUTPATIENT
Start: 2019-11-18

## 2019-11-18 RX ORDER — MIDAZOLAM HYDROCHLORIDE 1 MG/ML
INJECTION, SOLUTION INTRAMUSCULAR; INTRAVENOUS AS NEEDED
Status: DISCONTINUED | OUTPATIENT
Start: 2019-11-18 | End: 2019-11-18 | Stop reason: HOSPADM

## 2019-11-18 RX ORDER — SODIUM CHLORIDE 0.9 % (FLUSH) 0.9 %
5-40 SYRINGE (ML) INJECTION AS NEEDED
Status: CANCELLED | OUTPATIENT
Start: 2019-11-18

## 2019-11-18 RX ORDER — FAMOTIDINE 20 MG/1
20 TABLET, FILM COATED ORAL ONCE
Status: DISCONTINUED | OUTPATIENT
Start: 2019-11-18 | End: 2019-11-18 | Stop reason: HOSPADM

## 2019-11-18 RX ORDER — DEXTROMETHORPHAN/PSEUDOEPHED 2.5-7.5/.8
1.2 DROPS ORAL
Status: CANCELLED | OUTPATIENT
Start: 2019-11-18

## 2019-11-18 RX ORDER — MIDAZOLAM HYDROCHLORIDE 1 MG/ML
INJECTION, SOLUTION INTRAMUSCULAR; INTRAVENOUS
Status: DISCONTINUED
Start: 2019-11-18 | End: 2019-11-18 | Stop reason: HOSPADM

## 2019-11-18 RX ORDER — INSULIN LISPRO 100 [IU]/ML
INJECTION, SOLUTION INTRAVENOUS; SUBCUTANEOUS ONCE
Status: COMPLETED | OUTPATIENT
Start: 2019-11-18 | End: 2019-11-18

## 2019-11-18 RX ORDER — SODIUM CHLORIDE 9 MG/ML
25 INJECTION, SOLUTION INTRAVENOUS CONTINUOUS
Status: CANCELLED | OUTPATIENT
Start: 2019-11-18 | End: 2019-11-18

## 2019-11-18 RX ORDER — LIDOCAINE HYDROCHLORIDE 10 MG/ML
0.1 INJECTION, SOLUTION EPIDURAL; INFILTRATION; INTRACAUDAL; PERINEURAL AS NEEDED
Status: DISCONTINUED | OUTPATIENT
Start: 2019-11-18 | End: 2019-11-18 | Stop reason: HOSPADM

## 2019-11-18 RX ORDER — SODIUM CHLORIDE, SODIUM LACTATE, POTASSIUM CHLORIDE, CALCIUM CHLORIDE 600; 310; 30; 20 MG/100ML; MG/100ML; MG/100ML; MG/100ML
25 INJECTION, SOLUTION INTRAVENOUS CONTINUOUS
Status: DISCONTINUED | OUTPATIENT
Start: 2019-11-18 | End: 2019-11-18 | Stop reason: HOSPADM

## 2019-11-18 RX ADMIN — INSULIN LISPRO 6 UNITS: 100 INJECTION, SOLUTION INTRAVENOUS; SUBCUTANEOUS at 07:50

## 2019-11-18 RX ADMIN — SODIUM CHLORIDE, SODIUM LACTATE, POTASSIUM CHLORIDE, AND CALCIUM CHLORIDE 25 ML/HR: 600; 310; 30; 20 INJECTION, SOLUTION INTRAVENOUS at 07:56

## 2019-11-18 NOTE — PROCEDURES
Colonoscopy Report    Patient: Nnamdi Lee MRN: [id number]  SSN: [id number]    YOB: 1945  Age: 66 y.o. Sex: female      Date of Procedure: 11/18/2019    IMPRESSION:  1. Excellent prep---normal terminal ileum for 20 cm  2. Moderate diverticulosis sigmoid and left colon  3.  1 cm peg-pedunculated polyp which looked inflammatory and had superficial ulceration on the surface removed with cold snare from the splenic flexure. Nimisha Devin after removal controlled with a single clip  4. No significant anal pathology    RECOMMENDATIONS:  1. Call me in 3 weeks for polyp biopsy results if not received beforehand-----if adenoma or hyperplastic then repeat colon in 5 years  B/o high risk personal and family history    Indication:  Family history of colon cancer and personal history of colon adenomas---6 year exam  History: The history and physical exam were reviewed and updated. Procedure Performed: Colonoscopy polypectomy (cold snare), Moderate Sedation by GI  Endoscopist: John Farias MD  Assistant: Endoscopy Technician-1: Mile Santiago CNA  Endoscopy RN-1: Reta Serrano RN   ASA: ASA 2 - Patient with mild systemic disease with no functional limitations  Mallampati Score: II (soft palate, uvula, fauces visible)  Sedation:  Demerol 25 mg IV and Versed 3 mg IV  Endoscope: [id number]  Extent of Examination:terminal ileum  Quality of Preparation: excellent    Technique: The procedure was discussed with the patient including risks, benefits, alternatives including risks of IV sedation, bleeding, perforation and missed polyp. A safety timeout was performed. The patient was placed in left lateral position. The patient was given incremental doses of IV medication to achieve moderate  sedation. The patients vital signs were monitored at all times including heart rate, rhythm, blood pressure and oxygen saturation.   When adequate sedation was achieved a perianal inspection and a digital rectal exam were performed. The video colonoscope was introduced into the rectum and advanced under direct vision up to the cecum and 15 cm into the terminal ileum. The cecum was identified by IC valve, appendiceal orifice and convergence of the tineal folds. Careful examination of the colonic mucosa was then performed on slow withdrawal of the endoscope. Retroflexion was performed in the rectum and the distal rectum visualized as was the anal canal.  The patient tolerated the procedure very well and was transferred to recovery area. Findings:  See impression above  EBL: minimal  Specimen:   ID Type Source Tests Collected by Time Destination   1 : (cold snare) polyp  Preservative Splenic Flexure  Beau Fu MD 11/18/2019 6203 Pathology       Beau Garrett.  MD [place], [facility], [address] [facility]  November 18, 2019  8:59 AM

## 2019-11-18 NOTE — PERIOP NOTES
Phase 2 Endo-Recovery Summary  Report received from [facility], RN    Vitals:    11/15/19 1509 11/18/19 0734 11/18/19 0800 11/18/19 0842   BP:  187/80 162/58 173/74   Pulse:  69  61   Resp:  16     Temp:  97.9 °F (36.6 °C)     SpO2:  98%  95%   Weight: 90.3 kg (199 lb) 95.7 kg (211 lb)     Height: 5' 3\" (1.6 m) 5' 3\" (1.6 m)         oriented to time, place, person and situation    Patient up in chair and friend at the bedside. Lines and Drains  Peripheral Intravenous Line:  Removed at discharge      Doctor came in to speak to patient and family. Patient assisted to chair with minimal assist. Pateint tolerating liquids well. Patient's family at bedside. Discharge discussed with patient and family. No questions or concerns at this time. Patient had time to ask any questions. Patient discharged to home, with friend.       Jasiel Neff RN

## 2019-11-18 NOTE — H&P
Reason for Appointment   1. F/U. Medication management prior to procedure   2. Surveillance colonoscopy     History of Present Illness   encounter:   7/23/2019: The patient is a 66year old who has been sent for a colon polyp surveillance. Her last colonoscopy was in 2014 which revealed benign lymphoid nodules. Patient has a history of adenomas polyps. Patient has a family history of colon cancer. The patient reports that last year she was found to have atrial fibrillation. She had cardioversion in December and has been in normal sinus rhythm since then. She is on Eliquis and wondered whether or not this medicine would need to be stopped prior to the colonoscopy. She has no new pulmonary issues to discuss today. She has no active GI complaints. The patient denies abdominal pain, rectal bleeding or change in bowel habits.      Current Medications   Taking    clonidine 0.2 mg tablet 1 tab(s) orally 2 times a day    Aspir 81 81 mg enteric coated tablet 1 tab(s) orally once a day    Amlodipine Besylate 10 mg 1 tab PO qd    Eliquis 5 mg tablet as directed orally 2 times a day    Metoprolol Tartrate 50 mg tablet 1 tab(s) orally 2 times a day    glimepiride 4 mg tablet 1 tab(s) orally once a day    metformin 1000 mg tablet 1 tab(s) orally 2 times a day    atorvastatin 20 mg tablet 1 tab(s) orally once a day    candesartan-hydrochlorothiazide 32 mg-12.5 mg tablet 1 tab(s) orally once a day    Discontinued    Milk Thistle 500 mg capsule 6 capsule PO BID    Amaryl 4 mg tablet 1 tab(s) orally once a day    Glucophage 1000 mg tablet 1 tab(s) orally 2 times a day    Atacand HCT 32 mg-12.5 mg tablet 1 tab(s) orally once a day    atenolol 100 mg tablet 1 tab(s) orally once a day    Benadryl 25 mg capsule 1 cap(s) orally prn    Vitamin D2 50,000 intl units capsule 1 cap(s) orally 2 times a week    Lipitor 20 mg tablet 1 tab(s) orally once a day (at bedtime)    PEG-3350 - powder for reconstitution 4000 mL orally as directed Reglan 5 mg tablet 1 tab(s) orally as directed    Medication List reviewed and reconciled with the patient      Past Medical History   Elevated liver function tests . Hypertension. Hyperlipidemia. Asthma. Bronchitis. Adenomatous colon polyp. Type 2 diabetes. Family history of colon cancer. Diverticulosis. Hemorrhoids. Fatty liver. Atrial fibrillation. Surgical History   Colonoscopy x2 ( one done by Dr Prisca Cohn several years ago which was negative) 2006   Cholecystectomy (Lap) 2002   Cardioversion      Family History   Father:  76 yrs, - colon cancer. Brother(s): 1 brother with colon polyps (48)     Social History   Occupation: Retired. no Drugs (Illicit). Smoking   Tobacco use: former smoker 1/2 pack per day for 7 years; quit in [address],[facility]  Patient uses other tobacco products: No  no Alcohol. no IV Drug use. no Blood transfusions. Marital Status: . Allergies   Penicillin: hives   Sulfa     Review of Systems   Complete review of systems was taken and reviewed with the patient and will be scanned into the medical record. Positives will be noted in HPI. Negatives will not be listed here. Vital Signs   BMI 34.33, HR 87, /80, Wt 200, Ht 5'4\", RR 16.     Examination   General examination:  LABORATORY DATA: Dated:19:WBC:6.9; RBC:4.79; HGB:12.1; HCT:38.8; MCV:81; PLT:346; BUN:21; Creatinine:0.66; ALB:4.5; Total Bili:0.3; ALP:62; AST:19; ALT:19; Total Protein:6.7; TSH:3.200; Cholesterol:200(H); Glucose:140(H). Physical Examination   General: Pleasant, thin appearing female in no obvious distress. Neck: Supple. No thyromegaly or mass palpable. Nodes: No supraclavicular, axillary, cervical nodes palpable. Chest: Clear to auscultation symmetric breath sounds. Good air movement. Heart: First and second heart sounds are normal. No murmurs, rubs, gallops. Abdomen: Soft, nontender. No organomegaly or mass palpable.  Bowel sounds are normal there no bruits. Extremities: No pedal edema. Assessments     1. Other long term (current) drug therapy - Z79.899 (Primary), The patient has been advised to talk to the prescribing physician about holding Eliquis 48 hours prior to colonoscopy. It is mandatory then that she should let us know. I will proceed with colonoscopy regardless. In the meantime standard changes in diabetic medication per protocol in order to optimize safety of colonoscopy prep and procedure. She has been advised to take her usual blood pressure medicine on the day before and the day of colonoscopy so as to optimize blood pressure control and improve the safety of sedation. 2. Personal history of colonic polyps - Z86.010, Adenomatous polyps in the past. The patient is due for a 5 year follow-up colonoscopy. 3. Family history of colon cancer - Z80.0, Maximum five-year surveillance colonoscopy interval because of father with colon cancer in his 62s. 4. Essential (primary) hypertension - I10, She has been advised to take her usual blood pressure medicine on the day before and the day of colonoscopy so as to optimize blood pressure control and improve the safety of sedation. 5. Other specified diabetes mellitus without complications - I31.5, Standard changes and diabetic medication per protocol in order to optimize safety of colonoscopy prep and procedure. 6. Atrial fibrillation, unspecified type - I48.91, Successful cardioversion. Normal sinus rhythm since December. Not an active problem at this time. Treatment   1. Other long term (current) drug therapy   Notes: 1. The patient has been advised to talk to the prescribing physician about holding Eliquis 48 hours prior to colonoscopy. It is mandatory, then she should let us know. I will proceed with colonoscopy regardless. In the meantime standard changes in diabetic medication per protocol in order to optimize safety of colonoscopy prep and procedure. She has been advised to take her usual blood pressure medicine on the day before and the day of colonoscopy so as to optimize blood pressure control and improve the safety of sedation. 2. Personal history of colonic polyps   Start PEG - 3350 * with electrolytes powder for reconsitution, 4000 ml, as directed, orally, as directed, 2 days, 1 gallon bottle, Refills 0  Start Reglan for prep tab, 10 mg, 1 tab, orally, 1 hr before prep, 2 doses, 2 tabs, Refills 0  IMAGING: Colonoscopy with MAC - OFFICE (Ordered for 09/06/2019)  Notes: 1. Colonoscopy is being arranged September 6 11:00 in the morning. [employer]. Low-residue diet for 1 week prior to procedure. PEG 3350/Reglan prep. Maximum five-year surveillance colonoscopy interval because of adenomatous polyps in the past.      3. Essential (primary) hypertension   Notes: She has been advised to take her usual blood pressure medicine on the day before and the day of colonoscopy so as to optimize blood pressure control and improve the safety of sedation. 4. Other specified diabetes mellitus without complications   Notes: Standard changes in diabetic medication per protocol in order to optimize safety of colonoscopy prep and procedure.

## 2019-11-18 NOTE — DISCHARGE INSTRUCTIONS
RECOMMENDATIONS:  1. Call me in 3 weeks for polyp biopsy results if not received beforehand-----if adenoma or hyperplastic then repeat colon in 5 years  b/o high risk personal and family history    DISCHARGE SUMMARY from Nurse    PATIENT INSTRUCTIONS:    After general anesthesia or intravenous sedation, for 24 hours or while taking prescription Narcotics:  · Limit your activities  · Do not drive and operate hazardous machinery  · Do not make important personal or business decisions  · Do  not drink alcoholic beverages  · If you have not urinated within 8 hours after discharge, please contact your surgeon on call. Report the following to your surgeon:  · Excessive pain, swelling, redness or odor of or around the surgical area  · Temperature over 100.5  · Nausea and vomiting lasting longer than 4 hours or if unable to take medications  · Any signs of decreased circulation or nerve impairment to extremity: change in color, persistent  numbness, tingling, coldness or increase pain  · Any questions      These are general instructions for a healthy lifestyle:    No smoking/ No tobacco products/ Avoid exposure to second hand smoke  Surgeon General's Warning:  Quitting smoking now greatly reduces serious risk to your health. Obesity, smoking, and sedentary lifestyle greatly increases your risk for illness    A healthy diet, regular physical exercise & weight monitoring are important for maintaining a healthy lifestyle    You may be retaining fluid if you have a history of heart failure or if you experience any of the following symptoms:  Weight gain of 3 pounds or more overnight or 5 pounds in a week, increased swelling in our hands or feet or shortness of breath while lying flat in bed. Please call your doctor as soon as you notice any of these symptoms; do not wait until your next office visit. The discharge information has been reviewed with the patient. The patient verbalized understanding.   Discharge medications reviewed with the patient and appropriate educational materials and side effects teaching were provided. ___________________________________________________________________________________________________________________________________  Patient armband removed and given to patient to take home.   Patient was informed of the privacy risks if armband lost or stolen

## 2019-11-18 NOTE — INTERVAL H&P NOTE
H&P Update: 
Meli Cuenca was seen and examined. History and physical has been reviewed. The patient has been examined.  There have been no significant clinical changes since the completion of the originally dated History and Physical.

## 2019-11-18 NOTE — PERIOP NOTES
Pre-Op Summary    Pt arrived via car with family/friend and is oriented to time, place, person and situation. Patient with steady gait with none assistive devices. [facility] upon arrival at 0746 260.   6 units Humalog given. [facility] recheck at 0815  242. Visit Vitals  /80   Pulse 69   Temp 97.9 °F (36.6 °C)   Resp 16   Ht 5' 3\" (1.6 m)   Wt 95.7 kg (211 lb)   SpO2 98%   BMI 37.38 kg/m²       Peripheral IV located on Right hand . Patients belongings are located with friend, Alistair Campos. Patient's point of contact is Alistair Campos she will be in the waiting room. They are able to receive medication information. They will be their ride home.

## 2019-12-18 PROBLEM — Z86.010 ENCOUNTER FOR COLONOSCOPY DUE TO HISTORY OF ADENOMATOUS COLONIC POLYPS: Status: RESOLVED | Noted: 2019-11-18 | Resolved: 2019-12-18

## 2019-12-18 PROBLEM — Z12.11 ENCOUNTER FOR COLONOSCOPY DUE TO HISTORY OF ADENOMATOUS COLONIC POLYPS: Status: RESOLVED | Noted: 2019-11-18 | Resolved: 2019-12-18

## 2020-09-16 ENCOUNTER — HOSPITAL ENCOUNTER (EMERGENCY)
Age: 75
Discharge: HOME | End: 2020-09-16
Attending: EMERGENCY MEDICINE
Payer: MEDICARE

## 2020-09-16 VITALS
SYSTOLIC BLOOD PRESSURE: 171 MMHG | OXYGEN SATURATION: 97 % | TEMPERATURE: 98 F | RESPIRATION RATE: 20 BRPM | HEIGHT: 64 IN | WEIGHT: 210 LBS | HEART RATE: 80 BPM | DIASTOLIC BLOOD PRESSURE: 71 MMHG | BODY MASS INDEX: 35.85 KG/M2

## 2020-09-16 DIAGNOSIS — R53.81 MALAISE AND FATIGUE: Primary | ICD-10-CM

## 2020-09-16 DIAGNOSIS — I48.20 CHRONIC ATRIAL FIBRILLATION (HCC): ICD-10-CM

## 2020-09-16 DIAGNOSIS — R53.83 MALAISE AND FATIGUE: Primary | ICD-10-CM

## 2020-09-16 LAB
ALBUMIN SERPL-MCNC: 3.8 G/DL (ref 3.4–5)
ALBUMIN/GLOB SERPL: 1 {RATIO} (ref 0.8–1.7)
ALP SERPL-CCNC: 103 U/L (ref 45–117)
ALT SERPL-CCNC: 26 U/L (ref 13–56)
ANION GAP SERPL CALC-SCNC: 7 MMOL/L (ref 3–18)
APPEARANCE UR: CLEAR
AST SERPL-CCNC: 16 U/L (ref 10–38)
BASOPHILS # BLD: 0 K/UL (ref 0–0.1)
BASOPHILS NFR BLD: 0 % (ref 0–2)
BILIRUB SERPL-MCNC: 0.6 MG/DL (ref 0.2–1)
BILIRUB UR QL: NEGATIVE
BUN SERPL-MCNC: 15 MG/DL (ref 7–18)
BUN/CREAT SERPL: 19 (ref 12–20)
CALCIUM SERPL-MCNC: 9.1 MG/DL (ref 8.5–10.1)
CHLORIDE SERPL-SCNC: 104 MMOL/L (ref 100–111)
CO2 SERPL-SCNC: 29 MMOL/L (ref 21–32)
COLOR UR: YELLOW
CREAT SERPL-MCNC: 0.77 MG/DL (ref 0.6–1.3)
DIFFERENTIAL METHOD BLD: ABNORMAL
DIGOXIN SERPL-MCNC: 0.9 NG/ML (ref 0.9–2)
EOSINOPHIL # BLD: 0.1 K/UL (ref 0–0.4)
EOSINOPHIL NFR BLD: 1 % (ref 0–5)
ERYTHROCYTE [DISTWIDTH] IN BLOOD BY AUTOMATED COUNT: 17.9 % (ref 11.6–14.5)
GLOBULIN SER CALC-MCNC: 3.9 G/DL (ref 2–4)
GLUCOSE SERPL-MCNC: 132 MG/DL (ref 74–99)
GLUCOSE UR STRIP.AUTO-MCNC: NEGATIVE MG/DL
HCT VFR BLD AUTO: 37.2 % (ref 35–45)
HGB BLD-MCNC: 11.1 G/DL (ref 12–16)
HGB UR QL STRIP: NEGATIVE
INR PPP: 1.1 (ref 0.8–1.2)
KETONES UR QL STRIP.AUTO: NEGATIVE MG/DL
LEUKOCYTE ESTERASE UR QL STRIP.AUTO: NEGATIVE
LYMPHOCYTES # BLD: 1.9 K/UL (ref 0.9–3.6)
LYMPHOCYTES NFR BLD: 18 % (ref 21–52)
MCH RBC QN AUTO: 20.7 PG (ref 24–34)
MCHC RBC AUTO-ENTMCNC: 29.8 G/DL (ref 31–37)
MCV RBC AUTO: 69.3 FL (ref 74–97)
MONOCYTES # BLD: 0.8 K/UL (ref 0.05–1.2)
MONOCYTES NFR BLD: 8 % (ref 3–10)
NEUTS SEG # BLD: 7.9 K/UL (ref 1.8–8)
NEUTS SEG NFR BLD: 73 % (ref 40–73)
NITRITE UR QL STRIP.AUTO: NEGATIVE
PH UR STRIP: 7 [PH] (ref 5–8)
PLATELET # BLD AUTO: 418 K/UL (ref 135–420)
PMV BLD AUTO: 10.5 FL (ref 9.2–11.8)
POTASSIUM SERPL-SCNC: 3.5 MMOL/L (ref 3.5–5.5)
PROT SERPL-MCNC: 7.7 G/DL (ref 6.4–8.2)
PROT UR STRIP-MCNC: NEGATIVE MG/DL
PROTHROMBIN TIME: 13.9 SEC (ref 11.5–15.2)
RBC # BLD AUTO: 5.37 M/UL (ref 4.2–5.3)
SODIUM SERPL-SCNC: 140 MMOL/L (ref 136–145)
SP GR UR REFRACTOMETRY: <1.005 (ref 1–1.03)
TROPONIN I SERPL-MCNC: <0.02 NG/ML (ref 0–0.04)
UROBILINOGEN UR QL STRIP.AUTO: 0.2 EU/DL (ref 0.2–1)
WBC # BLD AUTO: 10.8 K/UL (ref 4.6–13.2)

## 2020-09-16 PROCEDURE — 93005 ELECTROCARDIOGRAM TRACING: CPT

## 2020-09-16 PROCEDURE — 80053 COMPREHEN METABOLIC PANEL: CPT

## 2020-09-16 PROCEDURE — 96360 HYDRATION IV INFUSION INIT: CPT

## 2020-09-16 PROCEDURE — 74011250636 HC RX REV CODE- 250/636: Performed by: EMERGENCY MEDICINE

## 2020-09-16 PROCEDURE — 84484 ASSAY OF TROPONIN QUANT: CPT

## 2020-09-16 PROCEDURE — 85025 COMPLETE CBC W/AUTO DIFF WBC: CPT

## 2020-09-16 PROCEDURE — 99282 EMERGENCY DEPT VISIT SF MDM: CPT

## 2020-09-16 PROCEDURE — 80162 ASSAY OF DIGOXIN TOTAL: CPT

## 2020-09-16 PROCEDURE — 81003 URINALYSIS AUTO W/O SCOPE: CPT

## 2020-09-16 PROCEDURE — 85610 PROTHROMBIN TIME: CPT

## 2020-09-16 RX ORDER — DIGOXIN 250 MCG
0.12 TABLET ORAL DAILY
COMMUNITY
Start: 2019-11-30

## 2020-09-16 RX ORDER — LOSARTAN POTASSIUM 25 MG/1
25 TABLET ORAL DAILY
COMMUNITY
Start: 2019-11-30

## 2020-09-16 RX ADMIN — SODIUM CHLORIDE 500 ML: 900 INJECTION, SOLUTION INTRAVENOUS at 21:15

## 2020-09-16 NOTE — ED TRIAGE NOTES
Pt to ed c/o \"feeling off\" since being told she has afib by her cardiologist on 9/14. Denies CP, denies dizziness or N/V. States she takes blood thinners but she is \"allergic to all the anti-arythmics\" unable to provide names of medications she is allergic to beside what it in chart.

## 2020-09-17 LAB
CALCULATED R AXIS, ECG10: 55 DEGREES
CALCULATED T AXIS, ECG11: 121 DEGREES
DIAGNOSIS, 93000: NORMAL
Q-T INTERVAL, ECG07: 346 MS
QRS DURATION, ECG06: 74 MS
QTC CALCULATION (BEZET), ECG08: 423 MS
VENTRICULAR RATE, ECG03: 90 BPM

## 2020-09-17 NOTE — ED PROVIDER NOTES
Trae Ballesteros is a 66 y.o. female with history of atrial fibrillation who states she has not felt well for last couple days. Patient saw her cardiologist on the 14th with no significant new med changes. Patient is felt more fatigued and tired. She denies any cough, fever, chest pain, shortness of breath. She has chronic lower extremity swelling which is not changed. Patient does have some urinary frequency but no dysuria or urgency. No hematuria. Denies any abdominal pain.,  Diarrhea, nausea, vomiting. No blood in her stool or melena. She is on anticoagulants. She denies any headache or new visual changes. She is had a decreased appetite. Symptoms are worse with activity. The history is provided by the patient and medical records.         Past Medical History:   Diagnosis Date    Atrial fibrillation ([address]     Breast calcifications     Left breast     Colon polyps     Diabetes ([address]     Fatty liver     Fibromuscular dysplasia of renal artery (HCC)     S/P Renal artery stent in 2001    Hypertension     Menopause     Sleep apnea     SVT (supraventricular tachycardia) ([address]        Past Surgical History:   Procedure Laterality Date    COLONOSCOPY N/A 11/18/2019    COLON[facility] performed by Ronny Rodriguez [facility] at [facility] ENDOSCOPY    ENDOSCOPY, COLON, DIAGNOSTIC      HX APPENDECTOMY      HX BREAST BIOPSY Left 9/3/2010    Left breast    HX BREAST BIOPSY      Liver    HX CHOLECYSTECTOMY      HX HYSTERECTOMY  1990    HX TONSILLECTOMY           Family History:   Problem Relation Age of Onset    Cancer Sister 71        breast cancer    Breast Cancer Sister 79    Hypertension Mother     Stroke Mother 59    Diabetes Other         grandmother    Colon Cancer Father 67    Breast Cancer Niece 36        38s       Social History     Socioeconomic History    Marital status:      Spouse name: Not on file    Number of children: Not on file    Years of education: Not on file   Alyssa [facility] education level: Not on file   Occupational History    Not on file   Social Needs    Financial resource strain: Not on file    Food insecurity     Worry: Not on file     Inability: Not on file    Transportation needs     Medical: Not on file     Non-medical: Not on file   Tobacco Use    Smoking status: Former Smoker    Smokeless tobacco: Never Used   Substance and Sexual Activity    Alcohol use: No    Drug use: No    Sexual activity: Not on file   Lifestyle    Physical activity     Days per week: Not on file     Minutes per session: Not on file    Stress: Not on file   Relationships    Social connections     Talks on phone: Not on file     Gets together: Not on file     Attends [employer] service: Not on file     Active member of club or organization: Not on file     Attends meetings of clubs or organizations: Not on file     Relationship status: Not on file    Intimate partner violence     Fear of current or ex partner: Not on file     Emotionally abused: Not on file     Physically abused: Not on file     Forced sexual activity: Not on file   Other Topics Concern    Not on file   Social History Narrative    Not on file         ALLERGIES: Ciprofloxacin; Codeine; Penicillins; and Sulfa (sulfonamide antibiotics)    Review of Systems   Constitutional: Negative for fever. HENT: Negative for sore throat and trouble swallowing. Eyes: Negative for visual disturbance. Respiratory: Negative for cough and shortness of breath. Cardiovascular: Negative for chest pain. Gastrointestinal: Negative for abdominal pain. Genitourinary: Positive for frequency. Negative for difficulty urinating. Musculoskeletal: Negative for gait problem. Skin: Negative for wound. Allergic/Immunologic: Negative for immunocompromised state. Neurological: Positive for light-headedness. Negative for syncope and weakness. Hematological: Bruises/bleeds easily. Psychiatric/Behavioral: Positive for sleep disturbance. Vitals:    09/16/20 1958   BP: (!) 171/71   Pulse: 80   Resp: 20   Temp: 98 °F (36.7 °C)   SpO2: 97%   Weight: 95.3 kg (210 lb)   Height: 5' 4\" (1.626 m)            Physical Exam  Vitals signs and nursing note reviewed. Constitutional:       General: She is not in acute distress. Appearance: She is well-developed. She is obese. She is not ill-appearing, toxic-appearing or diaphoretic. HENT:      Head: Normocephalic and atraumatic. Right Ear: External ear normal.      Left Ear: External ear normal.      Nose: Nose normal.      Mouth/Throat:      Pharynx: Uvula midline. Eyes:      General: No scleral icterus. Conjunctiva/sclera: Conjunctivae normal.   Neck:      Musculoskeletal: Neck supple. Cardiovascular:      Rate and Rhythm: Normal rate. Rhythm irregular. Heart sounds: Normal heart sounds. Pulmonary:      Effort: Pulmonary effort is normal.      Breath sounds: Normal breath sounds. Abdominal:      Palpations: Abdomen is soft. Tenderness: There is no abdominal tenderness. Musculoskeletal:      Right lower leg: Edema present. Left lower leg: Edema present. Skin:     General: Skin is warm and dry. Capillary Refill: Capillary refill takes less than 2 seconds. Neurological:      Mental Status: She is alert and oriented to person, place, and time.       Gait: Gait normal.   Psychiatric:         Behavior: Behavior normal.          MDM       Procedures    Vitals:  Patient Vitals for the past 12 hrs:   Temp Pulse Resp BP SpO2   09/16/20 1958 98 °F (36.7 °C) 80 20 (!) 171/71 97 %         Medications ordered:   Medications   sodium chloride 0.9 % bolus infusion 500 mL (500 mL IntraVENous New Bag 9/16/20 2115)         Lab findings:  Recent Results (from the past 12 hour(s))   EKG, 12 LEAD, INITIAL    Collection Time: 09/16/20  7:50 PM   Result Value Ref Range    Ventricular Rate 90 BPM    QRS Duration 74 ms    Q-T Interval 346 ms    QTC Calculation (Bezet) 423 ms Calculated R Axis 55 degrees    Calculated T Axis 121 degrees    Diagnosis       Atrial fibrillation  Septal infarct , age undetermined  Abnormal ECG  When compared with ECG of 02-DEC-2018 15:53,  No significant change was found     PROTHROMBIN TIME + INR    Collection Time: 09/16/20  9:03 PM   Result Value Ref Range    Prothrombin time 13.9 11.5 - 15.2 sec    INR 1.1 0.8 - 1.2     CBC WITH AUTOMATED DIFF    Collection Time: 09/16/20  9:03 PM   Result Value Ref Range    WBC 10.8 4.6 - 13.2 K/uL    RBC 5.37 (H) 4.20 - 5.30 M/uL    HGB 11.1 (L) 12.0 - 16.0 g/dL    HCT 37.2 35.0 - 45.0 %    MCV 69.3 (L) 74.0 - 97.0 FL    MCH 20.7 (L) 24.0 - 34.0 PG    MCHC 29.8 (L) 31.0 - 37.0 g/dL    RDW 17.9 (H) 11.6 - 14.5 %    PLATELET 625 329 - 575 K/uL    MPV 10.5 9.2 - 11.8 FL    NEUTROPHILS 73 40 - 73 %    LYMPHOCYTES 18 (L) 21 - 52 %    MONOCYTES 8 3 - 10 %    EOSINOPHILS 1 0 - 5 %    BASOPHILS 0 0 - 2 %    ABS. NEUTROPHILS 7.9 1.8 - 8.0 K/UL    ABS. LYMPHOCYTES 1.9 0.9 - 3.6 K/UL    ABS. MONOCYTES 0.8 0.05 - 1.2 K/UL    ABS. EOSINOPHILS 0.1 0.0 - 0.4 K/UL    ABS. BASOPHILS 0.0 0.0 - 0.1 K/UL    DF AUTOMATED     METABOLIC PANEL, COMPREHENSIVE    Collection Time: 09/16/20  9:03 PM   Result Value Ref Range    Sodium 140 136 - 145 mmol/L    Potassium 3.5 3.5 - 5.5 mmol/L    Chloride 104 100 - 111 mmol/L    CO2 29 21 - 32 mmol/L    Anion gap 7 3.0 - 18 mmol/L    Glucose 132 (H) 74 - 99 mg/dL    BUN 15 7.0 - 18 MG/DL    Creatinine 0.77 0.6 - 1.3 MG/DL    BUN/Creatinine ratio 19 12 - 20      GFR est AA >60 >60 ml/min/1.73m2    GFR est non-AA >60 >60 ml/min/1.73m2    Calcium 9.1 8.5 - 10.1 MG/DL    Bilirubin, total 0.6 0.2 - 1.0 MG/DL    ALT (SGPT) 26 13 - 56 U/L    AST (SGOT) 16 10 - 38 U/L    Alk.  phosphatase 103 45 - 117 U/L    Protein, total 7.7 6.4 - 8.2 g/dL    Albumin 3.8 3.4 - 5.0 g/dL    Globulin 3.9 2.0 - 4.0 g/dL    A-G Ratio 1.0 0.8 - 1.7     TROPONIN I    Collection Time: 09/16/20  9:03 PM   Result Value Ref Range Troponin-I, QT <0.02 0.0 - 0.045 NG/ML   URINALYSIS W/ RFLX MICROSCOPIC    Collection Time: 09/16/20  9:19 PM   Result Value Ref Range    Color YELLOW      Appearance CLEAR      Specific gravity <1.005 (L) 1.005 - 1.030    pH (UA) 7.0 5.0 - 8.0      Protein Negative NEG mg/dL    Glucose Negative NEG mg/dL    Ketone Negative NEG mg/dL    Bilirubin Negative NEG      Blood Negative NEG      Urobilinogen 0.2 0.2 - 1.0 EU/dL    Nitrites Negative NEG      Leukocyte Esterase Negative NEG         EKG interpretation by ED Physician:  Atrial fibrillation with no acute ST or T wave changes indicative of acute ischemia  Rate 90 QRS 74   No significant change from previous    Cardiac monitor: Normal rate with irregular rhythm and no ectopy  Pulse ox interpretation: 97% room air, normal    X-Ray, CT or other radiology findings or impressions:  No orders to display       Progress notes, Consult notes or additional Procedure notes:   Do not feel patient requires additional work-up here or imaging. I have discussed with patient and/or family/sig other the results, interpretation of any imaging if performed, suspected diagnosis and treatment plan to include instructions regarding the diagnoses listed to which understanding was expressed with all questions answered      Reevaluation of patient:   stable    Disposition:  Diagnosis:   1. Malaise and fatigue    2.  Chronic atrial fibrillation (HCC)        Disposition: home    Follow-up Information     Follow up With Specialties Details Why Contact Info    Mary Jo Marquez MD Cardiology Schedule an appointment as soon as possible for a visit  [address] [facility] [address] [facility] [phone]      Rodger Corky Samaniego MD Internal Medicine Schedule an appointment as soon as possible for a visit  [address]      [address] EMERGENCY DEPT Emergency Medicine  If symptoms worsen [address]  [phone] Patient's Medications   Start Taking    No medications on file   Continue Taking    ALBUTEROL (PROVENTIL HFA) 90 MCG/ACTUATION INHALER    Take  by inhalation. AMLODIPINE (NORVASC) 5 MG TABLET    Take 10 mg by mouth daily. APIXABAN (ELIQUIS) 5 MG TABLET    Take 1 Tab by mouth two (2) times a day. ASPIRIN 81 MG TABLET    Take 81 mg by mouth. ATORVASTATIN (LIPITOR) 20 MG TABLET    Take  by mouth daily. CLONIDINE HCL (CATAPRES) 0.2 MG TABLET    Take 0.2 mg by mouth Before breakfast, lunch, and dinner. DIGOXIN (LANOXIN) 0.25 MG TABLET    Take 0.125 mg by mouth daily. DIPHENHYDRAMINE HCL (BENADRYL ALLERGY PO)    Take  by mouth. GLIMEPIRIDE (AMARYL) 4 MG TABLET    glimepiride 4 mg tablet    INSULIN GLARGINE (LANTUS) 100 UNIT/ML INJECTION    Take 24 Units daily    LORAZEPAM (ATIVAN) 0.5 MG TABLET    Take 1 Tab by mouth three (3) times daily as needed. Max Daily Amount: 1.5 mg. LOSARTAN (COZAAR) 25 MG TABLET    Take 25 mg by mouth daily. METFORMIN (GLUCOPHAGE) 1,000 MG TABLET    metformin 1,000 mg tablet   bid    METOPROLOL TARTRATE 75 MG TAB    Take 75 mg by mouth every twelve (12) hours.    These Medications have changed    No medications on file   Stop Taking    No medications on file

## 2020-09-17 NOTE — DISCHARGE INSTRUCTIONS
Patient Education        Atrial Fibrillation: Care Instructions  Your Care Instructions     Atrial fibrillation is an irregular and often fast heartbeat. Treating this condition is important for several reasons. It can cause blood clots, which can travel from your heart to your brain and cause a stroke. If you have a fast heartbeat, you may feel lightheaded, dizzy, and weak. An irregular heartbeat can also increase your risk for heart failure. Atrial fibrillation is often the result of another heart condition, such as high blood pressure or coronary artery disease. Making changes to improve your heart condition will help you stay healthy and active. Follow-up care is a key part of your treatment and safety. Be sure to make and go to all appointments, and call your doctor if you are having problems. It's also a good idea to know your test results and keep a list of the medicines you take. How can you care for yourself at home? Medicines    · Take your medicines exactly as prescribed. Call your doctor if you think you are having a problem with your medicine. You will get more details on the specific medicines your doctor prescribes.     · If your doctor has given you a blood thinner to prevent a stroke, be sure you get instructions about how to take your medicine safely. Blood thinners can cause serious bleeding problems.     · Do not take any vitamins, over-the-counter drugs, or herbal products without talking to your doctor first.   Lifestyle changes    · Do not smoke. Smoking can increase your chance of a stroke and heart attack. If you need help quitting, talk to your doctor about stop-smoking programs and medicines. These can increase your chances of quitting for good.     · Eat a heart-healthy diet.     · Stay at a healthy weight. Lose weight if you need to.     · Limit alcohol to 2 drinks a day for men and 1 drink a day for women. Too much alcohol can cause health problems.     · Avoid colds and flu.  Get a pneumococcal vaccine shot. If you have had one before, ask your doctor whether you need another dose. Get a flu shot every year. If you must be around people with colds or flu, wash your hands often. Activity    · If your doctor recommends it, get more exercise. Walking is a good choice. Bit by bit, increase the amount you walk every day. Try for at least 30 minutes on most days of the week. You also may want to swim, bike, or do other activities. Your doctor may suggest that you join a cardiac rehabilitation program so that you can have help increasing your physical activity safely.     · Start light exercise if your doctor says it is okay. Even a small amount will help you get stronger, have more energy, and manage stress. Walking is an easy way to get exercise. Start out by walking a little more than you did in the hospital. Gradually increase the amount you walk.     · When you exercise, watch for signs that your heart is working too hard. You are pushing too hard if you cannot talk while you are exercising. If you become short of breath or dizzy or have chest pain, sit down and rest immediately.     · Check your pulse regularly. Place two fingers on the artery at the palm side of your wrist, in line with your thumb. If your heartbeat seems uneven or fast, talk to your doctor. When should you call for help? Call 911 anytime you think you may need emergency care. For example, call if:    · You have symptoms of a heart attack. These may include:  ? Chest pain or pressure, or a strange feeling in the chest.  ? Sweating. ? Shortness of breath. ? Nausea or vomiting. ? Pain, pressure, or a strange feeling in the back, neck, jaw, or upper belly or in one or both shoulders or arms. ? Lightheadedness or sudden weakness. ? A fast or irregular heartbeat. After you call 911, the  may tell you to chew 1 adult-strength or 2 to 4 low-dose aspirin. Wait for an ambulance.  Do not try to drive yourself.     · You have symptoms of a stroke. These may include:  ? Sudden numbness, tingling, weakness, or loss of movement in your face, arm, or leg, especially on only one side of your body. ? Sudden vision changes. ? Sudden trouble speaking. ? Sudden confusion or trouble understanding simple statements. ? Sudden problems with walking or balance. ? A sudden, severe headache that is different from past headaches.     · You passed out (lost consciousness). Call your doctor now or seek immediate medical care if:    · You have new or increased shortness of breath.     · You feel dizzy or lightheaded, or you feel like you may faint.     · Your heart rate becomes irregular.     · You can feel your heart flutter in your chest or skip heartbeats. Tell your doctor if these symptoms are new or worse. Watch closely for changes in your health, and be sure to contact your doctor if you have any problems. Where can you learn more? Go to http://taylor-emanuel.info/  Enter U020 in the search box to learn more about \"Atrial Fibrillation: Care Instructions. \"  Current as of: December 16, 2019               Content Version: 12.6  © 8006-6898 [employer]. Care instructions adapted under license by [employer] (which disclaims liability or warranty for this information). If you have questions about a medical condition or this instruction, always ask your healthcare professional. [address] any warranty or liability for your use of this information. Patient Education        Fatigue: Care Instructions  Your Care Instructions     Fatigue is a feeling of tiredness, exhaustion, or lack of energy. You may feel fatigue because of too much or not enough activity. It can also come from stress, lack of sleep, boredom, and poor diet. Many medical problems, such as viral infections, can cause fatigue.  Emotional problems, especially depression, are often the cause of fatigue. Fatigue is most often a symptom of another problem. Treatment for fatigue depends on the cause. For example, if you have fatigue because you have a certain health problem, treating this problem also treats your fatigue. If depression or anxiety is the cause, treatment may help. Follow-up care is a key part of your treatment and safety. Be sure to make and go to all appointments, and call your doctor if you are having problems. It's also a good idea to know your test results and keep a list of the medicines you take. How can you care for yourself at home? · Get regular exercise. But don't overdo it. Go back and forth between rest and exercise. · Get plenty of rest.  · Eat a healthy diet. Do not skip meals, especially breakfast.  · Reduce your use of caffeine, tobacco, and alcohol. Caffeine is most often found in coffee, tea, cola drinks, and chocolate. · Limit medicines that can cause fatigue. This includes tranquilizers and cold and allergy medicines. When should you call for help? Watch closely for changes in your health, and be sure to contact your doctor if:    · You have new symptoms such as fever or a rash.     · Your fatigue gets worse.     · You have been feeling down, depressed, or hopeless. Or you may have lost interest in things that you usually enjoy.     · You are not getting better as expected. Where can you learn more? Go to http://taylor-emanuel.info/  Enter [id number] in the search box to learn more about \"Fatigue: Care Instructions. \"  Current as of: June 26, 2019               Content Version: 12.6  © 1083-9503 [employer]. Care instructions adapted under license by [employer] (which disclaims liability or warranty for this information).  If you have questions about a medical condition or this instruction, always ask your healthcare professional. [address] any warranty or liability for your use of this information.

## 2020-12-07 ENCOUNTER — TRANSCRIBE ORDER (OUTPATIENT)
Dept: SCHEDULING | Age: 75
End: 2020-12-07

## 2020-12-07 DIAGNOSIS — Z12.31 VISIT FOR SCREENING MAMMOGRAM: Primary | ICD-10-CM

## 2021-01-13 ENCOUNTER — HOSPITAL ENCOUNTER (OUTPATIENT)
Dept: MAMMOGRAPHY | Age: 76
Discharge: HOME | End: 2021-01-13
Attending: FAMILY MEDICINE
Payer: MEDICARE

## 2021-01-13 DIAGNOSIS — Z12.31 VISIT FOR SCREENING MAMMOGRAM: ICD-10-CM

## 2021-01-13 PROCEDURE — 77063 BREAST TOMOSYNTHESIS BI: CPT

## 2021-06-07 ENCOUNTER — TELEPHONE (OUTPATIENT)
Age: 76
End: 2021-06-07

## 2021-06-08 ENCOUNTER — HOSPITAL ENCOUNTER (OUTPATIENT)
Dept: INFUSION THERAPY | Age: 76
Discharge: HOME | End: 2021-06-08
Payer: MEDICARE

## 2021-06-08 ENCOUNTER — OFFICE VISIT (OUTPATIENT)
Age: 76
End: 2021-06-08
Payer: MEDICARE

## 2021-06-08 VITALS
BODY MASS INDEX: 35.85 KG/M2 | HEART RATE: 77 BPM | DIASTOLIC BLOOD PRESSURE: 63 MMHG | SYSTOLIC BLOOD PRESSURE: 131 MMHG | WEIGHT: 210 LBS | HEIGHT: 64 IN | OXYGEN SATURATION: 99 %

## 2021-06-08 DIAGNOSIS — D50.9 MICROCYTIC ANEMIA: Primary | ICD-10-CM

## 2021-06-08 DIAGNOSIS — D50.9 MICROCYTIC ANEMIA: ICD-10-CM

## 2021-06-08 DIAGNOSIS — D50.0 IRON DEFICIENCY ANEMIA DUE TO CHRONIC BLOOD LOSS: ICD-10-CM

## 2021-06-08 LAB
ALBUMIN SERPL-MCNC: 3.4 G/DL (ref 3.4–5)
ALBUMIN/GLOB SERPL: 0.9 {RATIO} (ref 0.8–1.7)
ALP SERPL-CCNC: 111 U/L (ref 45–117)
ALT SERPL-CCNC: 30 U/L (ref 13–56)
ANION GAP SERPL CALC-SCNC: 6 MMOL/L (ref 3–18)
AST SERPL-CCNC: 24 U/L (ref 10–38)
BASOPHILS # BLD: 0.1 K/UL (ref 0–0.1)
BASOPHILS NFR BLD: 1 % (ref 0–2)
BILIRUB SERPL-MCNC: 0.4 MG/DL (ref 0.2–1)
BUN SERPL-MCNC: 15 MG/DL (ref 7–18)
BUN/CREAT SERPL: 23 (ref 12–20)
CALCIUM SERPL-MCNC: 8.8 MG/DL (ref 8.5–10.1)
CHLORIDE SERPL-SCNC: 105 MMOL/L (ref 100–111)
CO2 SERPL-SCNC: 32 MMOL/L (ref 21–32)
CREAT SERPL-MCNC: 0.64 MG/DL (ref 0.6–1.3)
CRP SERPL-MCNC: 1.9 MG/DL (ref 0–0.3)
DIFFERENTIAL METHOD BLD: ABNORMAL
EOSINOPHIL # BLD: 0.1 K/UL (ref 0–0.4)
EOSINOPHIL NFR BLD: 1 % (ref 0–5)
ERYTHROCYTE [DISTWIDTH] IN BLOOD BY AUTOMATED COUNT: 19.8 % (ref 11.6–14.5)
ERYTHROCYTE [SEDIMENTATION RATE] IN BLOOD: 39 MM/HR (ref 0–30)
FERRITIN SERPL-MCNC: 7 NG/ML (ref 8–388)
FOLATE SERPL-MCNC: 14.8 NG/ML (ref 3.1–17.5)
GLOBULIN SER CALC-MCNC: 3.6 G/DL (ref 2–4)
GLUCOSE SERPL-MCNC: 143 MG/DL (ref 74–99)
HCT VFR BLD AUTO: 35.6 % (ref 35–45)
HGB BLD-MCNC: 9.7 G/DL (ref 12–16)
IRON SATN MFR SERPL: 23 % (ref 20–50)
IRON SERPL-MCNC: 109 UG/DL (ref 50–175)
LYMPHOCYTES # BLD: 1.4 K/UL (ref 0.9–3.6)
LYMPHOCYTES NFR BLD: 13 % (ref 21–52)
MCH RBC QN AUTO: 18.2 PG (ref 24–34)
MCHC RBC AUTO-ENTMCNC: 27.2 G/DL (ref 31–37)
MCV RBC AUTO: 66.7 FL (ref 74–97)
MONOCYTES # BLD: 0.6 K/UL (ref 0.05–1.2)
MONOCYTES NFR BLD: 6 % (ref 3–10)
NEUTS SEG # BLD: 8.2 K/UL (ref 1.8–8)
NEUTS SEG NFR BLD: 79 % (ref 40–73)
PLATELET # BLD AUTO: 414 K/UL (ref 135–420)
PLATELET COMMENTS,PCOM: ABNORMAL
PMV BLD AUTO: 10.6 FL (ref 9.2–11.8)
POTASSIUM SERPL-SCNC: 4.6 MMOL/L (ref 3.5–5.5)
PROT SERPL-MCNC: 7 G/DL (ref 6.4–8.2)
RBC # BLD AUTO: 5.34 M/UL (ref 4.2–5.3)
RBC MORPH BLD: ABNORMAL
SODIUM SERPL-SCNC: 143 MMOL/L (ref 136–145)
T4 FREE SERPL-MCNC: 1.1 NG/DL (ref 0.7–1.5)
TIBC SERPL-MCNC: 476 UG/DL (ref 250–450)
TSH SERPL DL<=0.05 MIU/L-ACNC: 2.85 UIU/ML (ref 0.36–3.74)
VIT B12 SERPL-MCNC: 440 PG/ML (ref 211–911)
WBC # BLD AUTO: 10.4 K/UL (ref 4.6–13.2)

## 2021-06-08 PROCEDURE — 1090F PRES/ABSN URINE INCON ASSESS: CPT | Performed by: INTERNAL MEDICINE

## 2021-06-08 PROCEDURE — 1101F PT FALLS ASSESS-DOCD LE1/YR: CPT | Performed by: INTERNAL MEDICINE

## 2021-06-08 PROCEDURE — G8427 DOCREV CUR MEDS BY ELIG CLIN: HCPCS | Performed by: INTERNAL MEDICINE

## 2021-06-08 PROCEDURE — 82728 ASSAY OF FERRITIN: CPT

## 2021-06-08 PROCEDURE — G8752 SYS BP LESS 140: HCPCS | Performed by: INTERNAL MEDICINE

## 2021-06-08 PROCEDURE — 83516 IMMUNOASSAY NONANTIBODY: CPT

## 2021-06-08 PROCEDURE — G8399 PT W/DXA RESULTS DOCUMENT: HCPCS | Performed by: INTERNAL MEDICINE

## 2021-06-08 PROCEDURE — 80053 COMPREHEN METABOLIC PANEL: CPT

## 2021-06-08 PROCEDURE — 83540 ASSAY OF IRON: CPT

## 2021-06-08 PROCEDURE — G8754 DIAS BP LESS 90: HCPCS | Performed by: INTERNAL MEDICINE

## 2021-06-08 PROCEDURE — 36415 COLL VENOUS BLD VENIPUNCTURE: CPT

## 2021-06-08 PROCEDURE — 86140 C-REACTIVE PROTEIN: CPT

## 2021-06-08 PROCEDURE — 82607 VITAMIN B-12: CPT

## 2021-06-08 PROCEDURE — 99204 OFFICE O/P NEW MOD 45 MIN: CPT | Performed by: INTERNAL MEDICINE

## 2021-06-08 PROCEDURE — 83020 HEMOGLOBIN ELECTROPHORESIS: CPT

## 2021-06-08 PROCEDURE — G8417 CALC BMI ABV UP PARAM F/U: HCPCS | Performed by: INTERNAL MEDICINE

## 2021-06-08 PROCEDURE — 84439 ASSAY OF FREE THYROXINE: CPT

## 2021-06-08 PROCEDURE — G0463 HOSPITAL OUTPT CLINIC VISIT: HCPCS | Performed by: INTERNAL MEDICINE

## 2021-06-08 PROCEDURE — 85025 COMPLETE CBC W/AUTO DIFF WBC: CPT

## 2021-06-08 PROCEDURE — 85652 RBC SED RATE AUTOMATED: CPT

## 2021-06-08 PROCEDURE — G8510 SCR DEP NEG, NO PLAN REQD: HCPCS | Performed by: INTERNAL MEDICINE

## 2021-06-08 PROCEDURE — G8536 NO DOC ELDER MAL SCRN: HCPCS | Performed by: INTERNAL MEDICINE

## 2021-06-08 NOTE — PROGRESS NOTES
[facility] Progress Note    Date: 2021    Name: Kandace Shankar    MRN: [id number]         : 1945    Peripheral Lab Draw      Ms. Mendez to [facility], ambulatory at  accompanied by self. Pt was assessed and education was provided. There were no vitals taken for this visit. Blood obtained peripherally from left arm x 1 attempt with butterfly needle and sent to lab for Cbc w/diff, Cmp, Iron Profile, Ferritin, Tsh, Free T4, C reactive Protein, Sed rate, Hemoglobin Fractionation, Celiac panel, Vitamin B12 & Folate per written orders. No bleeding or hematoma noted at site. Gauze and coban applied. Ms. Edelmira Conde tolerated the phlebotomy, and had no complaints. Patient armband removed and shredded. Ms. Edelmira Conde was discharged from [place] [address] in stable condition at [phone].      Mouna Jones Phlebotomist PCT  2021  12:40 PM

## 2021-06-08 NOTE — PROGRESS NOTES
[facility]  [address] [place] [address], [address]  [facility]  Office Phone: [phone]  Fax: [phone]      Reason for visit: Anemia    HPI:   Cash Brown is a 64 y.o.  female who I was asked to see in consultation at the request of Dr. Rogers Howe  for evaluation for anemia. On review of systems today she denies any fevers, chills, shortness of breath, nausea vomiting or abdominal pain. No focal neurologic deficit. No lumps and bumps. No visual changes or headaches. Has some easy fatigue, arthritis pain. No melena or bright red blood per rectum. She has low energy. All other points of review of system have been reviewed and were negative. ECOG performance status 0. Independent with ADLs and IADLs. DX   Encounter Diagnoses   Name Primary?     Microcytic anemia Yes    Iron deficiency anemia due to chronic blood loss             Past Medical History:   Diagnosis Date    Atrial fibrillation (HCC)     Breast calcifications     Left breast     Colon polyps     Diabetes [facility] [facility]     Fatty liver     Fibromuscular dysplasia of renal artery (HCC)     S/P Renal artery stent in 2001    Hypertension     Menopause     Sleep apnea     SVT (supraventricular tachycardia) ([facility] [address]      Past Surgical History:   Procedure Laterality Date    COLONOSCOPY N/A 11/18/2019    COLONOSCOPY performed by Sarah Salazar MD at [address] ENDOSCOPY    ENDOSCOPY, COLON, DIAGNOSTIC      HX APPENDECTOMY      HX BREAST BIOPSY Left 9/3/2010    Left breast    HX BREAST BIOPSY      Liver    HX CHOLECYSTECTOMY      HX HYSTERECTOMY  1990    HX TONSILLECTOMY       Social History     Socioeconomic History    Marital status:      Spouse name: Not on file    Number of children: Not on file    Years of education: Not on file    Highest education level: Not on file   Tobacco Use    Smoking status: Former Smoker     Packs/day: 0.50     Years: 5.00     Pack years: 2.50 Quit date: 34     Years since quittin.4    Smokeless tobacco: Never Used   Vaping Use    Vaping Use: Never used   Substance and Sexual Activity    Alcohol use: No    Drug use: No    Sexual activity: Not Currently     Social Determinants of Health     Financial Resource Strain:     Difficulty of Paying Living Expenses:    Food Insecurity:     Worried About Running Out of Food in the Last Year:     Ran Out of Food in the Last Year:    Transportation Needs:     Lack of Transportation (Medical):  Lack of Transportation (Non-Medical):    Physical Activity:     Days of Exercise per Week:     Minutes of Exercise per Session:    Stress:     Feeling of Stress :    Social Connections:     Frequency of Communication with Friends and Family:     Frequency of Social Gatherings with Friends and Family:     Attends Jainism Services:     Active Member of Clubs or Organizations:     Attends Club or Organization Meetings:     Marital Status:      Family History   Problem Relation Age of Onset    Cancer Sister 71        breast cancer    Breast Cancer Sister 79    Hypertension Mother     Stroke Mother 59    Diabetes Other         grandmother    Colon Cancer Father 67    Breast Cancer Niece 39        45s       Current Outpatient Medications   Medication Sig Dispense Refill    digoxin (LANOXIN) 0.25 mg tablet Take 0.125 mg by mouth daily.  losartan (COZAAR) 25 mg tablet Take 25 mg by mouth daily.  metFORMIN (GLUCOPHAGE) 1,000 mg tablet metformin 1,000 mg tablet   bid      glimepiride (AMARYL) 4 mg tablet glimepiride 4 mg tablet      LORazepam (ATIVAN) 0.5 mg tablet Take 1 Tab by mouth three (3) times daily as needed. Max Daily Amount: 1.5 mg. 30 Tab 0    apixaban (ELIQUIS) 5 mg tablet Take 1 Tab by mouth two (2) times a day.  60 Tab 0    insulin glargine (LANTUS) 100 unit/mL injection Take 24 Units daily 2 Vial 0    metoprolol tartrate 75 mg tab Take 75 mg by mouth every twelve (12) hours. (Patient taking differently: Take 100 mg by mouth every twelve (12) hours.) 60 Tab 0    amLODIPine (NORVASC) 5 mg tablet Take 10 mg by mouth daily.  cloNIDine HCl (CATAPRES) 0.2 mg tablet Take 0.2 mg by mouth Before breakfast, lunch, and dinner.  atorvastatin (LIPITOR) 20 mg tablet Take  by mouth daily.  albuterol (PROVENTIL HFA) 90 mcg/actuation inhaler Take  by inhalation.  aspirin 81 mg tablet Take 81 mg by mouth.  DIPHENHYDRAMINE HCL (BENADRYL ALLERGY PO) Take  by mouth. Allergies   Allergen Reactions    Ciprofloxacin Hives    Codeine Other (comments)     States not allergic      Flecainide Itching    Penicillins Not Reported This Time    Sulfa (Sulfonamide Antibiotics) Hives       Review of Systems  As per HPi. Objective:  Physical Exam:  Visit Vitals  /63   Pulse 77   Ht 5' 4\" (1.626 m)   Wt 95.3 kg (210 lb)   SpO2 99%   BMI 36.05 kg/m²       General:  Alert, cooperative, no distress, appears stated age. Head:  Normocephalic, without obvious abnormality, atraumatic. Eyes:  Conjunctivae/corneas clear. PERRL, EOMs intact. Throat: Lips, mucosa, and tongue normal.    Neck: Supple, symmetrical, trachea midline, no adenopathy, thyroid: no enlargement/tenderness/nodules   Back:   Symmetric, no curvature. ROM normal. No CVA tenderness. Lungs:   Clear to auscultation bilaterally. Chest wall:  No tenderness or deformity. Heart:  Regular rate and rhythm, S1, S2 normal, no murmur, click, rub or gallop. Abdomen:   Soft, non-tender. Bowel sounds normal. No masses,  No organomegaly. Extremities: Extremities normal, atraumatic, no cyanosis or edema. Skin: Skin color, texture, turgor normal. No rashes or lesions. Lymph nodes: Cervical, supraclavicular, and axillary nodes normal.   Neurologic: CNII-XII intact.        Diagnostic Imaging     Results for orders placed during the hospital encounter of 02/26/16    CT CHEST WO CONT    Narrative  CT CHEST WITHOUT CONTRAST    HISTORY / INDICATION: Evaluate nodular opacity seen in the right lower lung on  chest radiographs. COMPARISON: 3/22/2015. Report from chest radiographs 2/10/2016. TECHNIQUE: Multiple axial CT images of the chest were obtained    . Sagittal and  coronal reconstructions were also acquired. FINDINGS:    Thyroid: Normal without focal nodule. Mediastinum: Heart and great vessels are normal. No pericardial effusion. Coronary artery atherosclerotic calcifications. Esophagus is normal.    Trachea / Bronchi: Normal.    Pleura: No pneumothorax. No pleural effusion. Lung parenchyma: There is a 3 mm subpleural pulmonary nodule in the lateral  aspect of the right upper lobe (series 3, image 13). There is an additional 2 mm  nodule in the lateral aspect of the left upper lobe (image 28). Otherwise,  normal.    Upper abdomen: Status-post cholecystectomy. Left renal cyst. There is a 1.1 cm  left adrenal nodule, unchanged from 3/22/2015, with Hounsfield units less than  20, likely a benign adenoma. Otherwise, unremarkable. Vasculature: Moderate atherosclerosis. Skeleton: DISH of the upper to mid thoracic spine with moderate to marked  spondylosis. No fractures. There is a nonspecific 9 mm sclerotic lesion to the  left of midline in the vertebral body of T8, likely enostosis/bone island. Lymph Nodes: No adenopathy is present based on size criteria. Largest node is a  right paratracheal lymph node, measuring 9 mm in short axis with prominent fatty  hilum . Otherwise, no significant lymphadenopathy. Soft Tissues: Normal.    Impression  :    1. No CT evidence of acute cardiopulmonary process. No abnormality to correspond  with reported right lower lobe nodular opacity on prior radiographs (comparison  images not available). 2. Indeterminate 3 mm right upper lobe and 2 mm left upper lobe nodule.  Please  see below Fleischner Thor[employer] Society recommendations regarding follow-up of  pulmonary nodules of this size:    A. Low smoking or other exposure risk:  < or =4 mm: No follow up necessary    B. High smoking or other exposure risk:  < or =4 mm: Follow up CT at 12 m; if stable, no further follow up. 3. Left adrenal adenoma, unchanged from 3/22/2015, likely benign. As the attending radiologist, I have personally reviewed the study, and I agree  with the findings as reported/edited. Lab Results  Lab Results   Component Value Date/Time    WBC 10.8 09/16/2020 09:03 PM    HGB 11.1 (L) 09/16/2020 09:03 PM    HCT 37.2 09/16/2020 09:03 PM    PLATELET 214 11/01/3518 09:03 PM    MCV 69.3 (L) 09/16/2020 09:03 PM       Lab Results   Component Value Date/Time    Sodium 140 09/16/2020 09:03 PM    Potassium 3.5 09/16/2020 09:03 PM    Chloride 104 09/16/2020 09:03 PM    CO2 29 09/16/2020 09:03 PM    Anion gap 7 09/16/2020 09:03 PM    Glucose 132 (H) 09/16/2020 09:03 PM    BUN 15 09/16/2020 09:03 PM    Creatinine 0.77 09/16/2020 09:03 PM    BUN/Creatinine ratio 19 09/16/2020 09:03 PM    GFR est AA >60 09/16/2020 09:03 PM    GFR est non-AA >60 09/16/2020 09:03 PM    Calcium 9.1 09/16/2020 09:03 PM    Alk. phosphatase 103 09/16/2020 09:03 PM    Protein, total 7.7 09/16/2020 09:03 PM    Albumin 3.8 09/16/2020 09:03 PM    Globulin 3.9 09/16/2020 09:03 PM    A-G Ratio 1.0 09/16/2020 09:03 PM    ALT (SGPT) 26 09/16/2020 09:03 PM     Follow-up with PCP for health maintenance  Assessment/Plan:  64 y.o. female  With  1. Microcytic anemia  I had a long discussion with patient regarding her microcytic anemia which is likely due to iron deficiency. Her MCV in September 2020 was 71 however it was 80 in December 2008. Her last colonoscopy was on  Check labs as below and replace as needed. Her last colonoscopy was done by  and found one benign polyp. Next due in 3-4 years. She does not eat much meat. Daughters and sisters have anemia. She is taking oral iron. - CBC WITH AUTOMATED DIFF;  Future  - FERRITIN; Future  - IRON PROFILE; Future  - METABOLIC PANEL, COMPREHENSIVE; Future  - TSH AND FREE T4; Future  - VITAMIN B12 & FOLATE; Future  - CELIAC PANEL; Future  - Hemoglobin fractionation    2. Iron deficiency anemia due to chronic blood loss  As above  - CBC WITH AUTOMATED DIFF; Future  - FERRITIN; Future  - IRON PROFILE; Future  - METABOLIC PANEL, COMPREHENSIVE; Future  - TSH AND FREE T4; Future  - VITAMIN B12 & FOLATE; Future  - CELIAC PANEL;  Future    Thank you Dr. Cindy Jung for referring patient to our care    CC:    Return in 2 week-virtual

## 2021-06-10 LAB
DEPRECATED HGB OTHER BLD-IMP: ABNORMAL %
ENDOMYSIUM IGA SER QL: NEGATIVE
GLIADIN PEPTIDE IGA SER-ACNC: 3 UNITS (ref 0–19)
GLIADIN PEPTIDE IGG SER-ACNC: 2 UNITS (ref 0–19)
HGB A MFR BLD: 98.3 % (ref 96.4–98.8)
HGB A2 MFR BLD COLUMN CHROM: 1.7 % (ref 1.8–3.2)
HGB C MFR BLD: ABNORMAL %
HGB F MFR BLD: 0 % (ref 0–2)
HGB FRACT BLD-IMP: ABNORMAL
HGB S BLD QL SOLY: ABNORMAL
HGB S MFR BLD: 0 %
IGA SERPL-MCNC: 184 MG/DL (ref 64–422)
TTG IGA SER-ACNC: <2 U/ML (ref 0–3)
TTG IGG SER-ACNC: 5 U/ML (ref 0–5)

## 2021-06-22 ENCOUNTER — VIRTUAL VISIT (OUTPATIENT)
Age: 76
End: 2021-06-22
Payer: MEDICARE

## 2021-06-22 DIAGNOSIS — D50.0 IRON DEFICIENCY ANEMIA DUE TO CHRONIC BLOOD LOSS: ICD-10-CM

## 2021-06-22 DIAGNOSIS — D50.9 MICROCYTIC ANEMIA: Primary | ICD-10-CM

## 2021-06-22 DIAGNOSIS — E03.8 TSH (THYROID-STIMULATING HORMONE DEFICIENCY): ICD-10-CM

## 2021-06-22 DIAGNOSIS — E53.8 VITAMIN B 12 DEFICIENCY: ICD-10-CM

## 2021-06-22 PROCEDURE — 99443 PR PHYS/QHP TELEPHONE EVALUATION 21-30 MIN: CPT | Performed by: INTERNAL MEDICINE

## 2021-06-22 NOTE — PROGRESS NOTES
Leticia Rajput is a 64 y.o. female who was seen by synchronous (real-time) audio- technology on 6/22/2021. Assessment & Plan:     Diagnoses and all orders for this visit:    1. Microcytic anemia    2. Iron deficiency anemia due to chronic blood loss        The complexity of medical decision making for this visit is moderate   Follow-up and Dispositions    · Return in about 2 months (around 8/22/2021). 1. Microcytic anemia  I had a long discussion with patient regarding her microcytic anemia which is likely due to iron deficiency. Her MCV in September 2020 was 71 however it was 80 in December 2008. Labs on 6/8/2021 showed ferritin 7, transferrin saturation 23%, BUN 15, creatinine 0.64, normal TSH, B12 and folate. Negative celiac panel. Hemoglobin electrophoresis showed normal adult hemoglobin. Both CRP and ESR were elevated. WBC 10.4, H&H 9.7/35.6, MCV 66.7, platelet 521. *Says that her last colonoscopy was 2 years ago. She says she is due for repeat colonoscopy in 3 years. Follow-up with Dr. Conor Fontaine x2 with     2. Iron deficiency anemia due to chronic blood loss  As above.     Thank you Dr. Ashlee Odom for referring patient to our care     CC:     Return in 2 months with repeat labs  I spent at least 25 minutes on this visit with this established patient. 712  Subjective:   Leticia Rajput is a 64 y.o.  female who I was asked to see in consultation at the request of Dr. Bakari Howe  for evaluation for anemia.     On review of systems today she denies any fevers, chills, shortness of breath, nausea vomiting or abdominal pain. No focal neurologic deficit. No lumps and bumps. No visual changes or headaches. Has some easy fatigue, arthritis pain. No melena or bright red blood per rectum. She has low energy. All other points of review of system have been reviewed and were negative. ECOG performance status 0. Independent with ADLs and IADLs.        Prior to Admission medications    Medication Sig Start Date End Date Taking? Authorizing Provider   digoxin (LANOXIN) 0.25 mg tablet Take 0.125 mg by mouth daily. 11/30/19   Marce Perez MD   losartan (COZAAR) 25 mg tablet Take 25 mg by mouth daily. 11/30/19   Marce Perez MD   metFORMIN (GLUCOPHAGE) 1,000 mg tablet metformin 1,000 mg tablet   bid    Provider, Historical   glimepiride (AMARYL) 4 mg tablet glimepiride 4 mg tablet    Provider, Historical   LORazepam (ATIVAN) 0.5 mg tablet Take 1 Tab by mouth three (3) times daily as needed. Max Daily Amount: 1.5 mg. 12/7/18   Benito Blancas MD   apixaban (ELIQUIS) 5 mg tablet Take 1 Tab by mouth two (2) times a day. 12/7/18   Benito Blancas MD   insulin glargine (LANTUS) 100 unit/mL injection Take 24 Units daily 12/7/18   Benito Blancas MD   metoprolol tartrate 75 mg tab Take 75 mg by mouth every twelve (12) hours. Patient taking differently: Take 100 mg by mouth every twelve (12) hours. 12/7/18   Benito Blancas MD   amLODIPine (NORVASC) 5 mg tablet Take 10 mg by mouth daily. Marce Perez MD   cloNIDine HCl (CATAPRES) 0.2 mg tablet Take 0.2 mg by mouth Before breakfast, lunch, and dinner. Marce Perez MD   atorvastatin (LIPITOR) 20 mg tablet Take  by mouth daily. Provider, Historical   albuterol (PROVENTIL HFA) 90 mcg/actuation inhaler Take  by inhalation. Provider, Historical   aspirin 81 mg tablet Take 81 mg by mouth. Provider, Historical   DIPHENHYDRAMINE HCL (BENADRYL ALLERGY PO) Take  by mouth.     Provider, Historical     Patient Active Problem List   Diagnosis Code    Abscess of back L26.1    Family history of colon cancer requiring screening colonoscopy Z80.0    HTN (hypertension) I10    DM (diabetes mellitus) ([facility] [address] E11.9    Atrial fibrillation with RVR (HCC) I48.91    Microcytic anemia D50.9    Iron deficiency anemia due to chronic blood loss D50.0     Patient Active Problem List    Diagnosis Date Noted    Microcytic anemia 06/08/2021    Iron deficiency anemia due to chronic blood loss 06/08/2021    HTN (hypertension) 12/02/2018    DM (diabetes mellitus) ([facility] [address] 12/02/2018    Atrial fibrillation with RVR ([facility] 12/02/2018    Family history of colon cancer requiring screening colonoscopy 07/01/2014    Abscess of back 10/05/2012     Current Outpatient Medications   Medication Sig Dispense Refill    digoxin (LANOXIN) 0.25 mg tablet Take 0.125 mg by mouth daily.  losartan (COZAAR) 25 mg tablet Take 25 mg by mouth daily.  metFORMIN (GLUCOPHAGE) 1,000 mg tablet metformin 1,000 mg tablet   bid      glimepiride (AMARYL) 4 mg tablet glimepiride 4 mg tablet      LORazepam (ATIVAN) 0.5 mg tablet Take 1 Tab by mouth three (3) times daily as needed. Max Daily Amount: 1.5 mg. 30 Tab 0    apixaban (ELIQUIS) 5 mg tablet Take 1 Tab by mouth two (2) times a day. 60 Tab 0    insulin glargine (LANTUS) 100 unit/mL injection Take 24 Units daily 2 Vial 0    metoprolol tartrate 75 mg tab Take 75 mg by mouth every twelve (12) hours. (Patient taking differently: Take 100 mg by mouth every twelve (12) hours.) 60 Tab 0    amLODIPine (NORVASC) 5 mg tablet Take 10 mg by mouth daily.  cloNIDine HCl (CATAPRES) 0.2 mg tablet Take 0.2 mg by mouth Before breakfast, lunch, and dinner.  atorvastatin (LIPITOR) 20 mg tablet Take  by mouth daily.  albuterol (PROVENTIL HFA) 90 mcg/actuation inhaler Take  by inhalation.  aspirin 81 mg tablet Take 81 mg by mouth.  DIPHENHYDRAMINE HCL (BENADRYL ALLERGY PO) Take  by mouth.        Allergies   Allergen Reactions    Ciprofloxacin Hives    Codeine Other (comments)     States not allergic      Flecainide Itching    Penicillins Not Reported This Time    Sulfa (Sulfonamide Antibiotics) Hives     Past Medical History:   Diagnosis Date    Atrial fibrillation (HCC)     Breast calcifications     Left breast     Colon polyps     Diabetes ([facility] [address]     Fatty liver     Fibromuscular dysplasia of renal artery (HCC)     S/P Renal artery stent in     Hypertension     Menopause     Sleep apnea     SVT (supraventricular tachycardia) (HCC)      Past Surgical History:   Procedure Laterality Date    COLONOSCOPY N/A 2019    COLONOSCOPY performed by Nayla Sidhu MD at [address] [place] [address] ENDOSCOPY    ENDOSCOPY, COLON, DIAGNOSTIC      HX APPENDECTOMY      HX BREAST BIOPSY Left 9/3/2010    Left breast    HX BREAST BIOPSY      Liver    HX CHOLECYSTECTOMY      HX HYSTERECTOMY      HX TONSILLECTOMY       Family History   Problem Relation Age of Onset    Cancer Sister 71        breast cancer    Breast Cancer Sister 79    Hypertension Mother     Stroke Mother 59    Diabetes Other         grandmother    Colon Cancer Father 67    Breast Cancer Niece 36        38s     Social History     Tobacco Use    Smoking status: Former Smoker     Packs/day: 0.50     Years: 5.00     Pack years: 2.50     Quit date:      Years since quittin.5    Smokeless tobacco: Never Used   Substance Use Topics    Alcohol use: No       ROS: As per HPi    Objective:   No flowsheet data found. General: alert, cooperative, no distress     Additional exam findings: We discussed the expected course, resolution and complications of the diagnosis(es) in detail. Medication risks, benefits, costs, interactions, and alternatives were discussed as indicated. I advised her to contact the office if her condition worsens, changes or fails to improve as anticipated. She expressed understanding with the diagnosis(es) and plan. Rosita Davis, was evaluated through a synchronous (real-time) audio- encounter. The patient (or guardian if applicable) is aware that this is a billable service. Verbal consent to proceed has been obtained within the past 12 months.  The visit was conducted pursuant to the emergency declaration under the [facility] waiver authority and the Coronavirus Preparedness and Response Supplemental Appropriations Act. Patient identification was verified, and a caregiver was present when appropriate. The patient was located in a state where the provider was credentialed to provide care.     Bjorn Yates MD

## 2021-06-23 RX ORDER — EPINEPHRINE 1 MG/ML
0.3 INJECTION, SOLUTION, CONCENTRATE INTRAVENOUS AS NEEDED
Status: CANCELLED | OUTPATIENT
Start: 2021-06-25

## 2021-06-23 RX ORDER — HEPARIN 100 UNIT/ML
300-500 SYRINGE INTRAVENOUS AS NEEDED
Status: CANCELLED
Start: 2021-06-25

## 2021-06-23 RX ORDER — ALBUTEROL SULFATE 0.83 MG/ML
2.5 SOLUTION RESPIRATORY (INHALATION) AS NEEDED
Status: CANCELLED
Start: 2021-06-25

## 2021-06-23 RX ORDER — SODIUM CHLORIDE 9 MG/ML
10 INJECTION INTRAMUSCULAR; INTRAVENOUS; SUBCUTANEOUS AS NEEDED
Status: CANCELLED | OUTPATIENT
Start: 2021-06-25

## 2021-06-23 RX ORDER — ACETAMINOPHEN 325 MG/1
650 TABLET ORAL AS NEEDED
Status: CANCELLED
Start: 2021-06-25

## 2021-06-23 RX ORDER — DIPHENHYDRAMINE HYDROCHLORIDE 50 MG/ML
25 INJECTION, SOLUTION INTRAMUSCULAR; INTRAVENOUS AS NEEDED
Status: CANCELLED
Start: 2021-06-25

## 2021-06-23 RX ORDER — ONDANSETRON 2 MG/ML
8 INJECTION INTRAMUSCULAR; INTRAVENOUS AS NEEDED
Status: CANCELLED | OUTPATIENT
Start: 2021-06-25

## 2021-06-23 RX ORDER — DIPHENHYDRAMINE HYDROCHLORIDE 50 MG/ML
50 INJECTION, SOLUTION INTRAMUSCULAR; INTRAVENOUS AS NEEDED
Status: CANCELLED
Start: 2021-06-25

## 2021-06-23 RX ORDER — HYDROCORTISONE SODIUM SUCCINATE 100 MG/2ML
100 INJECTION, POWDER, FOR SOLUTION INTRAMUSCULAR; INTRAVENOUS AS NEEDED
Status: CANCELLED | OUTPATIENT
Start: 2021-06-25

## 2021-06-25 ENCOUNTER — HOSPITAL ENCOUNTER (OUTPATIENT)
Dept: INFUSION THERAPY | Age: 76
Discharge: HOME | End: 2021-06-25
Payer: MEDICARE

## 2021-06-25 VITALS
SYSTOLIC BLOOD PRESSURE: 137 MMHG | BODY MASS INDEX: 35.14 KG/M2 | WEIGHT: 204.7 LBS | HEART RATE: 76 BPM | RESPIRATION RATE: 18 BRPM | OXYGEN SATURATION: 98 % | TEMPERATURE: 97.1 F | DIASTOLIC BLOOD PRESSURE: 66 MMHG

## 2021-06-25 DIAGNOSIS — D50.0 IRON DEFICIENCY ANEMIA DUE TO CHRONIC BLOOD LOSS: Primary | ICD-10-CM

## 2021-06-25 DIAGNOSIS — D50.0 IRON DEFICIENCY ANEMIA DUE TO CHRONIC BLOOD LOSS: ICD-10-CM

## 2021-06-25 PROCEDURE — 96365 THER/PROPH/DIAG IV INF INIT: CPT

## 2021-06-25 PROCEDURE — 74011250636 HC RX REV CODE- 250/636: Performed by: NURSE PRACTITIONER

## 2021-06-25 RX ORDER — DIPHENHYDRAMINE HYDROCHLORIDE 50 MG/ML
25 INJECTION, SOLUTION INTRAMUSCULAR; INTRAVENOUS AS NEEDED
Status: CANCELLED
Start: 2021-07-02

## 2021-06-25 RX ORDER — ALBUTEROL SULFATE 0.83 MG/ML
2.5 SOLUTION RESPIRATORY (INHALATION) AS NEEDED
Status: CANCELLED
Start: 2021-07-02

## 2021-06-25 RX ORDER — HEPARIN 100 UNIT/ML
300-500 SYRINGE INTRAVENOUS AS NEEDED
Status: CANCELLED
Start: 2021-07-02

## 2021-06-25 RX ORDER — SODIUM CHLORIDE 9 MG/ML
25 INJECTION, SOLUTION INTRAVENOUS CONTINUOUS
Status: CANCELLED | OUTPATIENT
Start: 2021-07-02

## 2021-06-25 RX ORDER — ONDANSETRON 2 MG/ML
8 INJECTION INTRAMUSCULAR; INTRAVENOUS AS NEEDED
Status: CANCELLED | OUTPATIENT
Start: 2021-07-02

## 2021-06-25 RX ORDER — DIPHENHYDRAMINE HYDROCHLORIDE 50 MG/ML
50 INJECTION, SOLUTION INTRAMUSCULAR; INTRAVENOUS AS NEEDED
Status: CANCELLED
Start: 2021-07-02

## 2021-06-25 RX ORDER — SODIUM CHLORIDE 0.9 % (FLUSH) 0.9 %
10 SYRINGE (ML) INJECTION AS NEEDED
Status: DISPENSED | OUTPATIENT
Start: 2021-06-25 | End: 2021-06-25

## 2021-06-25 RX ORDER — SODIUM CHLORIDE 9 MG/ML
25 INJECTION, SOLUTION INTRAVENOUS CONTINUOUS
Status: DISPENSED | OUTPATIENT
Start: 2021-06-25 | End: 2021-06-25

## 2021-06-25 RX ORDER — EPINEPHRINE 1 MG/ML
0.3 INJECTION, SOLUTION, CONCENTRATE INTRAVENOUS AS NEEDED
Status: CANCELLED | OUTPATIENT
Start: 2021-07-02

## 2021-06-25 RX ORDER — SODIUM CHLORIDE 9 MG/ML
10 INJECTION INTRAMUSCULAR; INTRAVENOUS; SUBCUTANEOUS AS NEEDED
Status: CANCELLED | OUTPATIENT
Start: 2021-07-02

## 2021-06-25 RX ORDER — HYDROCORTISONE SODIUM SUCCINATE 100 MG/2ML
100 INJECTION, POWDER, FOR SOLUTION INTRAMUSCULAR; INTRAVENOUS AS NEEDED
Status: CANCELLED | OUTPATIENT
Start: 2021-07-02

## 2021-06-25 RX ORDER — SODIUM CHLORIDE 0.9 % (FLUSH) 0.9 %
10 SYRINGE (ML) INJECTION AS NEEDED
Status: CANCELLED | OUTPATIENT
Start: 2021-07-02

## 2021-06-25 RX ORDER — ACETAMINOPHEN 325 MG/1
650 TABLET ORAL AS NEEDED
Status: CANCELLED
Start: 2021-07-02

## 2021-06-25 RX ADMIN — SODIUM CHLORIDE 25 ML/HR: 0.9 INJECTION, SOLUTION INTRAVENOUS at 08:38

## 2021-06-25 RX ADMIN — Medication 10 ML: at 08:34

## 2021-06-25 RX ADMIN — FERRIC CARBOXYMALTOSE INJECTION 750 MG: 50 INJECTION, SOLUTION INTRAVENOUS at 08:38

## 2021-06-25 NOTE — PROGRESS NOTES
[facility] Progress Note    Date: 2021    Name: Doris Pinto    MRN: [id number]         : 1945    Injectafer Infusion 1 of 2    Ms. Linda White to [facility], ambulatory, at 1407. Pt was assessed and education was provided. Patient is here for Injectafer 1 of 2. Pt denies any pain or discomfort at this time. Ms. Stacia White vitals were reviewed and patient was observed for 5 minutes prior to treatment. Visit Vitals  /66   Pulse 72   Temp 97 °F (36.1 °C)   Resp 18   Wt 92.9 kg (204 lb 11.2 oz)   SpO2 98%   BMI 35.14 kg/m²     Patient Vitals for the past 12 hrs:   Temp Pulse Resp BP SpO2   21 0937 (P) 97.1 °F (36.2 °C) (P) 76 (P) 18  (P) 98 %   21 0905 97 °F (36.1 °C) 72 18 137/66 98 %   21 0819 97.3 °F (36.3 °C) 85 18 (!) 156/76 96 %         24 g PIV placed in RAC x one attempt. PIV flushed easily and had brisk blood return. Injectafer 750 mg/250 ml NS was initiated @ 750 ml/hr over 20 minutes as ordered. VS stable and pt denied complaints of itching, lip/tongue/facial swelling, SOB, CP or other complaints. Ms. Linda White tolerated the infusion, and had no complaints. VS remained stable. Patient observed in [facility] for 30 minutes post infusion. No reaction noted. PIV flushed with NS 10 ml and removed catheter intact. No bleeding or hematoma noted at site. Gauze and coban applied. Reviewed discharge instructions with patient, including expected side effects (abdominal cramping, nausea, changes in color of urine or feces) and signs of allergic reaction requiring medical attention (itching/hives/rashes, SOB, chest pain, lip/tongue/facial swelling). Patient given printed copy to take home. Patient verbalized understanding of discharge instructions. Patient armband removed and shredded. Ms. Linda White was discharged from [place] [address] in stable condition at [address]. She is to follow up on 2021 at 0800 for next appointment.     Aditi Arora RN  Fariba 25, 2021  9:48 AM

## 2021-07-02 ENCOUNTER — HOSPITAL ENCOUNTER (OUTPATIENT)
Dept: INFUSION THERAPY | Age: 76
Discharge: HOME | End: 2021-07-02
Payer: MEDICARE

## 2021-07-02 VITALS
DIASTOLIC BLOOD PRESSURE: 64 MMHG | TEMPERATURE: 97.1 F | RESPIRATION RATE: 18 BRPM | HEART RATE: 77 BPM | OXYGEN SATURATION: 94 % | SYSTOLIC BLOOD PRESSURE: 127 MMHG

## 2021-07-02 DIAGNOSIS — D50.0 IRON DEFICIENCY ANEMIA DUE TO CHRONIC BLOOD LOSS: Primary | ICD-10-CM

## 2021-07-02 DIAGNOSIS — D50.0 IRON DEFICIENCY ANEMIA DUE TO CHRONIC BLOOD LOSS: ICD-10-CM

## 2021-07-02 PROCEDURE — 96365 THER/PROPH/DIAG IV INF INIT: CPT

## 2021-07-02 PROCEDURE — 74011250636 HC RX REV CODE- 250/636: Performed by: NURSE PRACTITIONER

## 2021-07-02 RX ORDER — EPINEPHRINE 1 MG/ML
0.3 INJECTION, SOLUTION, CONCENTRATE INTRAVENOUS AS NEEDED
Status: CANCELLED | OUTPATIENT
Start: 2021-07-02

## 2021-07-02 RX ORDER — SODIUM CHLORIDE 9 MG/ML
25 INJECTION, SOLUTION INTRAVENOUS CONTINUOUS
Status: CANCELLED | OUTPATIENT
Start: 2021-07-02

## 2021-07-02 RX ORDER — SODIUM CHLORIDE 9 MG/ML
25 INJECTION, SOLUTION INTRAVENOUS CONTINUOUS
Status: DISPENSED | OUTPATIENT
Start: 2021-07-02 | End: 2021-07-02

## 2021-07-02 RX ORDER — HYDROCORTISONE SODIUM SUCCINATE 100 MG/2ML
100 INJECTION, POWDER, FOR SOLUTION INTRAMUSCULAR; INTRAVENOUS AS NEEDED
Status: CANCELLED | OUTPATIENT
Start: 2021-07-02

## 2021-07-02 RX ORDER — ONDANSETRON 2 MG/ML
8 INJECTION INTRAMUSCULAR; INTRAVENOUS AS NEEDED
Status: CANCELLED | OUTPATIENT
Start: 2021-07-02

## 2021-07-02 RX ORDER — SODIUM CHLORIDE 0.9 % (FLUSH) 0.9 %
10 SYRINGE (ML) INJECTION AS NEEDED
Status: CANCELLED | OUTPATIENT
Start: 2021-07-02

## 2021-07-02 RX ORDER — DIPHENHYDRAMINE HYDROCHLORIDE 50 MG/ML
50 INJECTION, SOLUTION INTRAMUSCULAR; INTRAVENOUS AS NEEDED
Status: CANCELLED
Start: 2021-07-02

## 2021-07-02 RX ORDER — ALBUTEROL SULFATE 0.83 MG/ML
2.5 SOLUTION RESPIRATORY (INHALATION) AS NEEDED
Status: CANCELLED
Start: 2021-07-02

## 2021-07-02 RX ORDER — HEPARIN 100 UNIT/ML
300-500 SYRINGE INTRAVENOUS AS NEEDED
Status: CANCELLED
Start: 2021-07-02

## 2021-07-02 RX ORDER — DIPHENHYDRAMINE HYDROCHLORIDE 50 MG/ML
25 INJECTION, SOLUTION INTRAMUSCULAR; INTRAVENOUS AS NEEDED
Status: CANCELLED
Start: 2021-07-02

## 2021-07-02 RX ORDER — SODIUM CHLORIDE 9 MG/ML
10 INJECTION INTRAMUSCULAR; INTRAVENOUS; SUBCUTANEOUS AS NEEDED
Status: CANCELLED | OUTPATIENT
Start: 2021-07-02

## 2021-07-02 RX ORDER — SODIUM CHLORIDE 0.9 % (FLUSH) 0.9 %
10 SYRINGE (ML) INJECTION AS NEEDED
Status: DISPENSED | OUTPATIENT
Start: 2021-07-02 | End: 2021-07-02

## 2021-07-02 RX ORDER — ACETAMINOPHEN 325 MG/1
650 TABLET ORAL AS NEEDED
Status: CANCELLED
Start: 2021-07-02

## 2021-07-02 RX ADMIN — FERRIC CARBOXYMALTOSE INJECTION 750 MG: 50 INJECTION, SOLUTION INTRAVENOUS at 08:32

## 2021-07-02 RX ADMIN — Medication 10 ML: at 08:55

## 2021-07-02 NOTE — PROGRESS NOTES
[facility] Progress Note    Date: 2021    Name: Sunitha Fernandez    MRN: [id number]         : 1945    Injectafer Infusion 2 of 2    Ms. Chris Glalo to [place], [facility], at 6631. Pt was assessed and education was provided. Patient is here for Injectafer 2 of 2. Pt denies any pain or discomfort at this time. Ms. Toney Stevens vitals were reviewed and patient was observed for 5 minutes prior to treatment. Visit Vitals  /64 (BP 1 Location: Left upper arm, BP Patient Position: Sitting)   Pulse 70   Temp (!) 96.4 °F (35.8 °C)   Resp 18   SpO2 96%   Breastfeeding No     Patient Vitals for the past 12 hrs:   Temp Pulse Resp BP SpO2   21 0815 (!) 96.4 °F (35.8 °C) 70 18 127/64 96 %         24 g PIV placed in LFA x 1 attempt. PIV flushed easily and had brisk blood return. Injectafer 750 mg/250 ml NS was initiated @ 750 ml/hr over 20 minutes as ordered. Ms. Chris Gallo tolerated the infusion, and had no complaints. VS remained stable. PIV flushed with NS 10 ml and removed catheter intact. No bleeding or hematoma noted at site. Gauze and coban applied. Reviewed discharge instructions with patient, including expected side effects. Patient verbalized understanding of discharge instructions. Patient armband removed and shredded. Ms. Chris Gallo was discharged from [place] [address] in stable condition at 0900. She has no further appointments in [facility] at this time.     Allen Dietz  2021

## 2021-07-07 ENCOUNTER — TELEPHONE (OUTPATIENT)
Age: 76
End: 2021-07-07

## 2021-07-07 NOTE — TELEPHONE ENCOUNTER
Pt called concerned about her visit. Pt states the clinic note she was giving says that she developed anemia due to chronic blood loss. Patient states  \"this is not true because I didn't see any  blood when I go to the bathroom. \" Please advise

## 2021-08-23 ENCOUNTER — HOSPITAL ENCOUNTER (OUTPATIENT)
Dept: INFUSION THERAPY | Age: 76
Discharge: HOME | End: 2021-08-23
Payer: MEDICARE

## 2021-08-23 DIAGNOSIS — E03.8 TSH (THYROID-STIMULATING HORMONE DEFICIENCY): ICD-10-CM

## 2021-08-23 DIAGNOSIS — D50.0 IRON DEFICIENCY ANEMIA DUE TO CHRONIC BLOOD LOSS: ICD-10-CM

## 2021-08-23 DIAGNOSIS — E53.8 VITAMIN B 12 DEFICIENCY: ICD-10-CM

## 2021-08-23 DIAGNOSIS — D50.9 MICROCYTIC ANEMIA: ICD-10-CM

## 2021-08-23 LAB
ALBUMIN SERPL-MCNC: 3.6 G/DL (ref 3.4–5)
ALBUMIN/GLOB SERPL: 1.1 {RATIO} (ref 0.8–1.7)
ALP SERPL-CCNC: 106 U/L (ref 45–117)
ALT SERPL-CCNC: 31 U/L (ref 13–56)
ANION GAP SERPL CALC-SCNC: 5 MMOL/L (ref 3–18)
AST SERPL-CCNC: 19 U/L (ref 10–38)
BASO+EOS+MONOS # BLD AUTO: 0.6 K/UL (ref 0–2.3)
BASO+EOS+MONOS NFR BLD AUTO: 7 % (ref 0.1–17)
BILIRUB SERPL-MCNC: 0.8 MG/DL (ref 0.2–1)
BUN SERPL-MCNC: 14 MG/DL (ref 7–18)
BUN/CREAT SERPL: 21 (ref 12–20)
CALCIUM SERPL-MCNC: 8.9 MG/DL (ref 8.5–10.1)
CHLORIDE SERPL-SCNC: 102 MMOL/L (ref 100–111)
CO2 SERPL-SCNC: 31 MMOL/L (ref 21–32)
CREAT SERPL-MCNC: 0.66 MG/DL (ref 0.6–1.3)
DIFFERENTIAL METHOD BLD: ABNORMAL
ERYTHROCYTE [DISTWIDTH] IN BLOOD BY AUTOMATED COUNT: 23.9 % (ref 11.5–14.5)
FERRITIN SERPL-MCNC: 206 NG/ML (ref 8–388)
FOLATE SERPL-MCNC: 11.2 NG/ML (ref 3.1–17.5)
GLOBULIN SER CALC-MCNC: 3.4 G/DL (ref 2–4)
GLUCOSE SERPL-MCNC: 202 MG/DL (ref 74–99)
HCT VFR BLD AUTO: 42.2 % (ref 36–48)
HGB BLD-MCNC: 13.1 G/DL (ref 12–16)
IRON SATN MFR SERPL: 25 % (ref 20–50)
IRON SERPL-MCNC: 79 UG/DL (ref 50–175)
LYMPHOCYTES # BLD: 1.4 K/UL (ref 1.1–5.9)
LYMPHOCYTES NFR BLD: 17 % (ref 14–44)
MCH RBC QN AUTO: 24.3 PG (ref 25–35)
MCHC RBC AUTO-ENTMCNC: 31 G/DL (ref 31–37)
MCV RBC AUTO: 78.4 FL (ref 78–102)
NEUTS SEG # BLD: 6 K/UL (ref 1.8–9.5)
NEUTS SEG NFR BLD: 76 % (ref 40–70)
PLATELET # BLD AUTO: 230 K/UL (ref 140–440)
POTASSIUM SERPL-SCNC: 3.9 MMOL/L (ref 3.5–5.5)
PROT SERPL-MCNC: 7 G/DL (ref 6.4–8.2)
RBC # BLD AUTO: 5.38 M/UL (ref 4.1–5.1)
SODIUM SERPL-SCNC: 138 MMOL/L (ref 136–145)
T4 FREE SERPL-MCNC: 1.1 NG/DL (ref 0.7–1.5)
TIBC SERPL-MCNC: 316 UG/DL (ref 250–450)
TSH SERPL DL<=0.05 MIU/L-ACNC: 3.53 UIU/ML (ref 0.36–3.74)
VIT B12 SERPL-MCNC: 299 PG/ML (ref 211–911)
WBC # BLD AUTO: 8 K/UL (ref 4.5–13)

## 2021-08-23 PROCEDURE — 84439 ASSAY OF FREE THYROXINE: CPT

## 2021-08-23 PROCEDURE — 82728 ASSAY OF FERRITIN: CPT

## 2021-08-23 PROCEDURE — 83540 ASSAY OF IRON: CPT

## 2021-08-23 PROCEDURE — 36415 COLL VENOUS BLD VENIPUNCTURE: CPT

## 2021-08-23 PROCEDURE — 83516 IMMUNOASSAY NONANTIBODY: CPT

## 2021-08-23 PROCEDURE — 80053 COMPREHEN METABOLIC PANEL: CPT

## 2021-08-23 PROCEDURE — 82607 VITAMIN B-12: CPT

## 2021-08-23 PROCEDURE — 85025 COMPLETE CBC W/AUTO DIFF WBC: CPT

## 2021-08-25 LAB
ENDOMYSIUM IGA SER QL: NEGATIVE
GLIADIN PEPTIDE IGA SER-ACNC: 4 UNITS (ref 0–19)
GLIADIN PEPTIDE IGG SER-ACNC: 2 UNITS (ref 0–19)
IGA SERPL-MCNC: 164 MG/DL (ref 64–422)
TTG IGA SER-ACNC: <2 U/ML (ref 0–3)
TTG IGG SER-ACNC: 5 U/ML (ref 0–5)

## 2021-08-30 ENCOUNTER — VIRTUAL VISIT (OUTPATIENT)
Age: 76
End: 2021-08-30
Payer: MEDICARE

## 2021-08-30 DIAGNOSIS — D50.9 MICROCYTIC ANEMIA: Primary | ICD-10-CM

## 2021-08-30 DIAGNOSIS — E53.8 B12 DEFICIENCY: ICD-10-CM

## 2021-08-30 DIAGNOSIS — D50.0 IRON DEFICIENCY ANEMIA DUE TO CHRONIC BLOOD LOSS: ICD-10-CM

## 2021-08-30 PROCEDURE — 99442 PR PHYS/QHP TELEPHONE EVALUATION 11-20 MIN: CPT | Performed by: INTERNAL MEDICINE

## 2021-08-30 NOTE — PROGRESS NOTES
Bernie Sidhu is a 64 y.o. female who was seen by synchronous (real-time) audio- technology on 8/30/2021. Assessment & Plan:     Diagnoses and all orders for this visit:    1. Microcytic anemia    2. Iron deficiency anemia due to chronic blood loss        The complexity of medical decision making for this visit is moderate     1. Microcytic anemia  I had a long discussion with patient regarding her microcytic anemia which is likely due to iron deficiency. Her MCV in September 2020 was 71 however it was 80 in December 2008. Labs on 6/8/2021 showed ferritin 7, transferrin saturation 23%, BUN 15, creatinine 0.64, normal TSH, B12 and folate. Negative celiac panel. Hemoglobin electrophoresis showed normal adult hemoglobin. She is status post Injectafer 2/2 completed on 7/2/2021  Labs on 8/23/2021 showed WBC 8.0, H&H 13.1/42.2, MCV 78.4, vitamin B12 299, normal folate, normal TSH and T4. Negative celiac panel. Ferritin 206, transferrin saturation 25. *Says that her last colonoscopy was 2 years ago. She says she is due for repeat colonoscopy in 2 years. Follow-up with Dr. Hola Walters  *Patient's iron deficiency anemia has resolved however she still has microcytosis. I will tell her to take oral iron to help. She also has borderline B12 at 299. I will check MMA.     2. Iron deficiency anemia due to chronic blood loss  As above.     Thank you Dr. Rayo Cristina for referring patient to our care     CC:     Return in 3 months with repeat labs  I spent at least 15 minutes on this visit with this established patient. 712  Subjective:   Bernie Sidhu is a 64 y.o.  female who I was asked to see in consultation at the request of Dr. Jase Howe  for evaluation for anemia.     On review of systems today she denies any fevers, chills, shortness of breath, nausea vomiting or abdominal pain. No focal neurologic deficit. No lumps and bumps. No visual changes or headaches.    No melena or bright red blood per rectum. She has low energy. Feels much better with more energy after IV iron. All other points of review of system have been reviewed and were negative. ECOG performance status 0. Independent with ADLs and IADLs.      Prior to Admission medications    Medication Sig Start Date End Date Taking? Authorizing Provider   digoxin (LANOXIN) 0.25 mg tablet Take 0.125 mg by mouth daily. 11/30/19   Marce Perez MD   losartan (COZAAR) 25 mg tablet Take 25 mg by mouth daily. 11/30/19   Marce Perez MD   metFORMIN (GLUCOPHAGE) 1,000 mg tablet metformin 1,000 mg tablet   bid    Provider, Historical   glimepiride (AMARYL) 4 mg tablet glimepiride 4 mg tablet    Provider, Historical   LORazepam (ATIVAN) 0.5 mg tablet Take 1 Tab by mouth three (3) times daily as needed. Max Daily Amount: 1.5 mg. 12/7/18   Winston Zepeda MD   apixaban (ELIQUIS) 5 mg tablet Take 1 Tab by mouth two (2) times a day. 12/7/18   Winston Zepeda MD   insulin glargine (LANTUS) 100 unit/mL injection Take 24 Units daily 12/7/18   Winston Zepeda MD   metoprolol tartrate 75 mg tab Take 75 mg by mouth every twelve (12) hours. Patient taking differently: Take 100 mg by mouth every twelve (12) hours. 12/7/18   Winston Zepeda MD   amLODIPine (NORVASC) 5 mg tablet Take 10 mg by mouth daily. Marce Perez MD   cloNIDine HCl (CATAPRES) 0.2 mg tablet Take 0.2 mg by mouth Before breakfast, lunch, and dinner. Marce Perez MD   atorvastatin (LIPITOR) 20 mg tablet Take  by mouth daily. Provider, Historical   albuterol (PROVENTIL HFA) 90 mcg/actuation inhaler Take  by inhalation. Provider, Historical   aspirin 81 mg tablet Take 81 mg by mouth. Provider, Historical   DIPHENHYDRAMINE HCL (BENADRYL ALLERGY PO) Take  by mouth.     Provider, Historical     Patient Active Problem List   Diagnosis Code    Abscess of back L26.1    Family history of colon cancer requiring screening colonoscopy Z80.0    HTN (hypertension) I10    DM (diabetes mellitus) ([facility] E11.9    Atrial fibrillation with RVR ([facility]) I48.91    Microcytic anemia D50.9    Iron deficiency anemia due to chronic blood loss D50.0     Patient Active Problem List    Diagnosis Date Noted    Microcytic anemia 06/08/2021    Iron deficiency anemia due to chronic blood loss 06/08/2021    HTN (hypertension) 12/02/2018    DM (diabetes mellitus) ([facility] 12/02/2018    Atrial fibrillation with RVR ([facility] 12/02/2018    Family history of colon cancer requiring screening colonoscopy 07/01/2014    Abscess of back 10/05/2012     Current Outpatient Medications   Medication Sig Dispense Refill    digoxin (LANOXIN) 0.25 mg tablet Take 0.125 mg by mouth daily.  losartan (COZAAR) 25 mg tablet Take 25 mg by mouth daily.  metFORMIN (GLUCOPHAGE) 1,000 mg tablet metformin 1,000 mg tablet   bid      glimepiride (AMARYL) 4 mg tablet glimepiride 4 mg tablet      LORazepam (ATIVAN) 0.5 mg tablet Take 1 Tab by mouth three (3) times daily as needed. Max Daily Amount: 1.5 mg. 30 Tab 0    apixaban (ELIQUIS) 5 mg tablet Take 1 Tab by mouth two (2) times a day. 60 Tab 0    insulin glargine (LANTUS) 100 unit/mL injection Take 24 Units daily 2 Vial 0    metoprolol tartrate 75 mg tab Take 75 mg by mouth every twelve (12) hours. (Patient taking differently: Take 100 mg by mouth every twelve (12) hours.) 60 Tab 0    amLODIPine (NORVASC) 5 mg tablet Take 10 mg by mouth daily.  cloNIDine HCl (CATAPRES) 0.2 mg tablet Take 0.2 mg by mouth Before breakfast, lunch, and dinner.  atorvastatin (LIPITOR) 20 mg tablet Take  by mouth daily.  albuterol (PROVENTIL HFA) 90 mcg/actuation inhaler Take  by inhalation.  aspirin 81 mg tablet Take 81 mg by mouth.  DIPHENHYDRAMINE HCL (BENADRYL ALLERGY PO) Take  by mouth.        Allergies   Allergen Reactions    Ciprofloxacin Hives    Codeine Other (comments)     States not allergic      Flecainide Itching    Penicillins Not Reported This Time    Sulfa (Sulfonamide Antibiotics) Hives     Past Medical History:   Diagnosis Date    Atrial fibrillation (HCC)     Breast calcifications     Left breast     Colon polyps     Diabetes ([facility]     Fatty liver     Fibromuscular dysplasia of renal artery (HCC)     S/P Renal artery stent in     Hypertension     Menopause     Sleep apnea     SVT (supraventricular tachycardia) (HCC)      Past Surgical History:   Procedure Laterality Date    COLONOSCOPY N/A 2019    [facility] at [facility] ENDOSCOPY    ENDOSCOPY, COLON, DIAGNOSTIC      HX APPENDECTOMY      HX BREAST BIOPSY Left 9/3/2010    Left breast    HX BREAST BIOPSY      Liver    HX CHOLECYSTECTOMY      HX HYSTERECTOMY      HX TONSILLECTOMY       Family History   Problem Relation Age of Onset    Cancer Sister 71        breast cancer    Breast Cancer Sister 79    Hypertension Mother     Stroke Mother 59    Diabetes Other         grandmother    Colon Cancer Father 67    Breast Cancer Niece 36        38s     Social History     Tobacco Use    Smoking status: Former Smoker     Packs/day: 0.50     Years: 5.00     Pack years: 2.50     Quit date:      Years since quittin.6    Smokeless tobacco: Never Used   Substance Use Topics    Alcohol use: No       ROS: As per HPi    Objective:   No flowsheet data found. General: alert, cooperative, no distress     Additional exam findings: We discussed the expected course, resolution and complications of the diagnosis(es) in detail. Medication risks, benefits, costs, interactions, and alternatives were discussed as indicated. I advised her to contact the office if her condition worsens, changes or fails to improve as anticipated. She expressed understanding with the diagnosis(es) and plan. Rosa Zamora, was evaluated through a synchronous (real-time) audio- encounter.  The patient (or guardian if applicable) is aware that this is a billable service. Verbal consent to proceed has been obtained within the past 12 months. The visit was conducted pursuant to the emergency declaration under the [address] [place] [address] and the [employer] and [employer] General Act. Patient identification was verified, and a caregiver was present when appropriate. The patient was located in a state where the provider was credentialed to provide care.     Percy Howe MD

## 2021-11-30 ENCOUNTER — HOSPITAL ENCOUNTER (OUTPATIENT)
Dept: INFUSION THERAPY | Age: 76
Discharge: HOME | End: 2021-11-30
Payer: MEDICARE

## 2021-11-30 DIAGNOSIS — D50.0 IRON DEFICIENCY ANEMIA DUE TO CHRONIC BLOOD LOSS: ICD-10-CM

## 2021-11-30 DIAGNOSIS — D50.9 MICROCYTIC ANEMIA: ICD-10-CM

## 2021-11-30 LAB
ALBUMIN SERPL-MCNC: 3.6 G/DL (ref 3.4–5)
ALBUMIN/GLOB SERPL: 1 {RATIO} (ref 0.8–1.7)
ALP SERPL-CCNC: 98 U/L (ref 45–117)
ALT SERPL-CCNC: 32 U/L (ref 13–56)
ANION GAP SERPL CALC-SCNC: 6 MMOL/L (ref 3–18)
AST SERPL-CCNC: 28 U/L (ref 10–38)
BASO+EOS+MONOS # BLD AUTO: 0.6 K/UL (ref 0–2.3)
BASO+EOS+MONOS NFR BLD AUTO: 7 % (ref 0.1–17)
BILIRUB SERPL-MCNC: 0.7 MG/DL (ref 0.2–1)
BUN SERPL-MCNC: 18 MG/DL (ref 7–18)
BUN/CREAT SERPL: 27 (ref 12–20)
CALCIUM SERPL-MCNC: 9.5 MG/DL (ref 8.5–10.1)
CHLORIDE SERPL-SCNC: 103 MMOL/L (ref 100–111)
CO2 SERPL-SCNC: 31 MMOL/L (ref 21–32)
CREAT SERPL-MCNC: 0.67 MG/DL (ref 0.6–1.3)
DIFFERENTIAL METHOD BLD: NORMAL
ERYTHROCYTE [DISTWIDTH] IN BLOOD BY AUTOMATED COUNT: 14.4 % (ref 11.5–14.5)
FERRITIN SERPL-MCNC: 220 NG/ML (ref 8–388)
GLOBULIN SER CALC-MCNC: 3.7 G/DL (ref 2–4)
GLUCOSE SERPL-MCNC: 171 MG/DL (ref 74–99)
HCT VFR BLD AUTO: 42.4 % (ref 36–48)
HGB BLD-MCNC: 13.9 G/DL (ref 12–16)
IRON SATN MFR SERPL: 25 % (ref 20–50)
IRON SERPL-MCNC: 80 UG/DL (ref 50–175)
LYMPHOCYTES # BLD: 1.9 K/UL (ref 1.1–5.9)
LYMPHOCYTES NFR BLD: 23 % (ref 14–44)
MCH RBC QN AUTO: 28.3 PG (ref 25–35)
MCHC RBC AUTO-ENTMCNC: 32.8 G/DL (ref 31–37)
MCV RBC AUTO: 86.4 FL (ref 78–102)
NEUTS SEG # BLD: 6.1 K/UL (ref 1.8–9.5)
NEUTS SEG NFR BLD: 70 % (ref 40–70)
PLATELET # BLD AUTO: 281 K/UL (ref 140–440)
POTASSIUM SERPL-SCNC: 4.1 MMOL/L (ref 3.5–5.5)
PROT SERPL-MCNC: 7.3 G/DL (ref 6.4–8.2)
RBC # BLD AUTO: 4.91 M/UL (ref 4.1–5.1)
SODIUM SERPL-SCNC: 140 MMOL/L (ref 136–145)
TIBC SERPL-MCNC: 319 UG/DL (ref 250–450)
WBC # BLD AUTO: 8.6 K/UL (ref 4.5–13)

## 2021-11-30 PROCEDURE — 80053 COMPREHEN METABOLIC PANEL: CPT

## 2021-11-30 PROCEDURE — 36415 COLL VENOUS BLD VENIPUNCTURE: CPT

## 2021-11-30 PROCEDURE — 85025 COMPLETE CBC W/AUTO DIFF WBC: CPT

## 2021-11-30 PROCEDURE — 83540 ASSAY OF IRON: CPT

## 2021-11-30 PROCEDURE — 82728 ASSAY OF FERRITIN: CPT

## 2021-11-30 PROCEDURE — 83921 ORGANIC ACID SINGLE QUANT: CPT

## 2021-11-30 NOTE — PROGRESS NOTES
[facility] Progress Note    Date: 2021    Name: Nikko Norton    MRN: [id number]         : 1945    Peripheral Lab Draw    Recent Results (from the past 12 hour(s))   CBC WITH 3 PART DIFF    Collection Time: 21  8:56 AM   Result Value Ref Range    WBC 8.6 4.5 - 13.0 K/uL    RBC 4.91 4.10 - 5.10 M/uL    HGB 13.9 12.0 - 16.0 g/dL    HCT 42.4 36 - 48 %    MCV 86.4 78 - 102 FL    MCH 28.3 25.0 - 35.0 PG    MCHC 32.8 31 - 37 g/dL    RDW 14.4 11.5 - 14.5 %    PLATELET 477 765 - 745 K/uL    NEUTROPHILS 70 40 - 70 %    MIXED CELLS 7 0.1 - 17 %    LYMPHOCYTES 23 14 - 44 %    ABS. NEUTROPHILS 6.1 1.8 - 9.5 K/UL    ABS. MIXED CELLS 0.6 0.0 - 2.3 K/uL    ABS. LYMPHOCYTES 1.9 1.1 - 5.9 K/UL    DF AUTOMATED         Ms. Renzo Méndez to [facility], ambulatory at 6010 accompanied by self. Pt was assessed and education was provided. Blood obtained peripherally from left arm x 1 attempt with butterfly needle and sent to lab for Cbc w/diff, Cmp, Iron Profile, Ferritin, Methylmalonic acid per written orders. No bleeding or hematoma noted at site. Gauze and coban applied. Ms. Renzo Méndez tolerated the phlebotomy, and had no complaints. Patient armband removed and shredded. Ms. Renzo Méndez was discharged from [place] [address] in stable condition at 4984.      Sanna Gates Phlebotomist PCT  2021  11:23 AM

## 2021-12-04 LAB
Lab: NORMAL
METHYLMALONATE SERPL-SCNC: 201 NMOL/L (ref 0–378)

## 2021-12-07 ENCOUNTER — OFFICE VISIT (OUTPATIENT)
Age: 76
End: 2021-12-07
Payer: MEDICARE

## 2021-12-07 VITALS
TEMPERATURE: 97.1 F | HEIGHT: 64 IN | HEART RATE: 80 BPM | DIASTOLIC BLOOD PRESSURE: 83 MMHG | WEIGHT: 213 LBS | RESPIRATION RATE: 16 BRPM | SYSTOLIC BLOOD PRESSURE: 157 MMHG | OXYGEN SATURATION: 96 % | BODY MASS INDEX: 36.37 KG/M2

## 2021-12-07 DIAGNOSIS — D50.0 IRON DEFICIENCY ANEMIA DUE TO CHRONIC BLOOD LOSS: Primary | ICD-10-CM

## 2021-12-07 PROCEDURE — G0463 HOSPITAL OUTPT CLINIC VISIT: HCPCS | Performed by: INTERNAL MEDICINE

## 2021-12-07 PROCEDURE — 99214 OFFICE O/P EST MOD 30 MIN: CPT | Performed by: INTERNAL MEDICINE

## 2021-12-07 RX ORDER — ERGOCALCIFEROL 1.25 MG/1
CAPSULE ORAL
COMMUNITY
Start: 2021-10-25

## 2021-12-07 NOTE — PROGRESS NOTES
Hematology/Oncology  Progress Note    Name: Mary Duran  Date: 2021  : 1945  Primary Care Provider: Shelly Kelly MD    Ms. Jesús Carson is a 64y.o. year old female with microcytic normochromic anemia in the past which responded well to intravenous iron infusion. Patient has several other medical includes issues including congestive heart failure diabetes. She is on Eliquis as well patient is blood test for celiac was negative    Current Therapy: Periodic intravenous iron infusion    Subjective: The past medical, surgical and social history has been reviewed and remains unchanged.    Past Medical History:   Diagnosis Date    Atrial fibrillation (HCC)     Breast calcifications     Left breast     Colon polyps     Diabetes ([address]     Fatty liver     Fibromuscular dysplasia of renal artery (HCC)     S/P Renal artery stent in     Hypertension     Menopause     Sleep apnea     SVT (supraventricular tachycardia) (HCC)      Past Surgical History:   Procedure Laterality Date    COLONOSCOPY N/A 2019    COLONOSCOPY performed by Carlos Hare MD at [address] [place] [address] ENDOSCOPY    ENDOSCOPY, COLON, DIAGNOSTIC      HX APPENDECTOMY      HX BREAST BIOPSY Left 9/3/2010    Left breast    HX BREAST BIOPSY      Liver    HX CHOLECYSTECTOMY      HX HYSTERECTOMY      HX TONSILLECTOMY       Social History     Socioeconomic History    Marital status:      Spouse name: Not on file    Number of children: Not on file    Years of education: Not on file    Highest education level: Not on file   Occupational History    Not on file   Tobacco Use    Smoking status: Former Smoker     Packs/day: 0.50     Years: 5.00     Pack years: 2.50     Quit date:      Years since quittin.9    Smokeless tobacco: Never Used   Vaping Use    Vaping Use: Never used   Substance and Sexual Activity    Alcohol use: No    Drug use: No    Sexual activity: Not Currently   Other Topics Concern    Not on file   Social History Narrative    Not on file     Social Determinants of Health     Financial Resource Strain:     Difficulty of Paying Living Expenses: Not on file   Food Insecurity:     Worried About Running Out of Food in the Last Year: Not on file    [place] of Food in the Last Year: Not on file   Transportation Needs:     Lack of Transportation (Medical): Not on file    Lack of Transportation (Non-Medical): Not on file   Physical Activity:     Days of Exercise per Week: Not on file    Minutes of Exercise per Session: Not on file   Stress:     Feeling of Stress : Not on file   Social Connections:     Frequency of Communication with Friends and Family: Not on file    Frequency of Social Gatherings with Friends and Family: Not on file    Attends Spiritism Services: Not on file    Active Member of [address] [place] [address] [employer] or Organizations: Not on file    Attends Club or Organization Meetings: Not on file    Marital Status: Not on file   Intimate Partner Violence:     Fear of Current or Ex-Partner: Not on file    Emotionally Abused: Not on file    Physically Abused: Not on file    Sexually Abused: Not on file   Housing Stability:     Unable to Pay for Housing in the Last Year: Not on file    Number of [place] in the Last Year: Not on file    Unstable Housing in the Last Year: Not on file     Family History   Problem Relation Age of Onset    Cancer Sister 71        breast cancer    Breast Cancer Sister 79    Hypertension Mother     Stroke Mother 59    Diabetes Other         grandmother    Colon Cancer Father 67    Breast Cancer Niece 39        45s     Current Outpatient Medications   Medication Sig Dispense Refill    ergocalciferol (ERGOCALCIFEROL) 1,250 mcg (50,000 unit) capsule       digoxin (LANOXIN) 0.25 mg tablet Take 0.125 mg by mouth daily.  losartan (COZAAR) 25 mg tablet Take 25 mg by mouth daily.       metFORMIN (GLUCOPHAGE) 1,000 mg tablet metformin 1,000 mg tablet   bid      glimepiride (AMARYL) 4 mg tablet glimepiride 4 mg tablet      LORazepam (ATIVAN) 0.5 mg tablet Take 1 Tab by mouth three (3) times daily as needed. Max Daily Amount: 1.5 mg. 30 Tab 0    apixaban (ELIQUIS) 5 mg tablet Take 1 Tab by mouth two (2) times a day. 60 Tab 0    insulin glargine (LANTUS) 100 unit/mL injection Take 24 Units daily 2 Vial 0    metoprolol tartrate 75 mg tab Take 75 mg by mouth every twelve (12) hours. (Patient taking differently: Take 100 mg by mouth every twelve (12) hours.) 60 Tab 0    amLODIPine (NORVASC) 5 mg tablet Take 10 mg by mouth daily.  cloNIDine HCl (CATAPRES) 0.2 mg tablet Take 0.2 mg by mouth Before breakfast, lunch, and dinner.  atorvastatin (LIPITOR) 20 mg tablet Take  by mouth daily.  albuterol (PROVENTIL HFA) 90 mcg/actuation inhaler Take  by inhalation.  aspirin 81 mg tablet Take 81 mg by mouth.  DIPHENHYDRAMINE HCL (BENADRYL ALLERGY PO) Take  by mouth. Review of Systems:    General :The patient has no complaints outside of pedal edema chronic and there is no physical distress evident. Psychological : patient denies having any psychological symptoms such as hallucinations depression or anxiety. Ophthalmic:the patient denies having any visual impairment or eye discomfort. Allergy and Immunology:the patient denies having any seasonal allergies or allergies to medications other than those already outlined above. Hematological and Lymphatic: the patient denies having any bruising, bleeding or lymphadenopathy. Endocrine: the patient denies having any heat or cold intolerance. There is no history of diabetes or thyroid disorders. Breast: the patient denies having any history of breast mass or lumps. Respiratory:the patient denies having any cough, shortness of breath, or dyspnea on exertion. Cardiovascular: there are no complaints of chest pain, palpitations, chest pounding, or dyspnea on exertion. Gastrointestinal: the patient denies having nausea, emesis, diarrhea, constipation, or blood in the stool. Genito-Urinary: the patient denies having urinary urgency, frequency, or dysuria. Musculoskeletal: with the exception of mild arthralgias the patient has no other musculoskeletal complaints. Neurological:  denies having any numbness, tingling, or neurologic deficits. Dermatological: patient denies having any unexplained rash, skin ulcerations, or hives. Objective:     Visit Vitals  BP (!) 157/83   Pulse 80   Temp 97.1 °F (36.2 °C)   Resp 16   Ht 5' 4\" (1.626 m)   Wt 96.6 kg (213 lb)   SpO2 96%   BMI 36.56 kg/m²     Pain Score: 3    Physical Exam:     ECO    General: Chronically ill appearing, in NAD    Psychologic: mood and affect are appropriate, no anxiety or depression noted    Skin: examination of the skin reveals no bruising, rash or petechiae skin on the feet is thickened and dry    HEENT: Normocephalic, atraumatic. Conjunctiva and sclera are clear. EOMs are intact. Neck: supple without lymphadenopathy, JVD or thyromegaly    Lymphatics: no palpable cervical, supraclavicular, infraclavicular, axillary or inguinal lymphadenopathy    Lungs: clear breath sounds bilaterally, no rhonchi or wheezes noted    Heart: Regular rate and rhythm, no murmurs, rubs or gallops, S1-S2 noted. Positive peripheral pulses bilaterally upper and lower extremities    Abdomen: soft, non-tender, non-distended, no HSM, positive bowel sounds    Extremities: without clubbing, cyanosis of the feet themselves have 2+ edema dry skin with cracking    Neurologic: no focal deficits, steady gait, Alert and oriented x 3.     Laboratory Data:     Results for orders placed or performed during the hospital encounter of 21   CBC WITH 3 PART DIFF     Status: None   Result Value Ref Range Status    WBC 8.6 4.5 - 13.0 K/uL Final    RBC 4.91 4.10 - 5.10 M/uL Final    HGB 13.9 12.0 - 16.0 g/dL Final    HCT 42.4 36 - 48 % Final    MCV 86.4 78 - 102 FL Final    MCH 28.3 25.0 - 35.0 PG Final    MCHC 32.8 31 - 37 g/dL Final    RDW 14.4 11.5 - 14.5 % Final    PLATELET 252 335 - 578 K/uL Final    NEUTROPHILS 70 40 - 70 % Final    MIXED CELLS 7 0.1 - 17 % Final    LYMPHOCYTES 23 14 - 44 % Final    ABS. NEUTROPHILS 6.1 1.8 - 9.5 K/UL Final    ABS. MIXED CELLS 0.6 0.0 - 2.3 K/uL Final    ABS. LYMPHOCYTES 1.9 1.1 - 5.9 K/UL Final     Comment: Test performed at [address] [place] [address] or Outpatient Infusion Center Location. Reviewed by Medical Director. DF AUTOMATED   Final       Patient Active Problem List   Diagnosis Code    Abscess of back L26.1    Family history of colon cancer requiring screening colonoscopy Z80.0    HTN (hypertension) I10    DM (diabetes mellitus) ([facility] E11.9    Atrial fibrillation with RVR (HCC) I48.91    Microcytic anemia D50.9    Iron deficiency anemia due to chronic blood loss D50.0         Assessment:     1. Iron deficiency anemia due to chronic blood loss        Plan:     1. Patient responded very well to the iron infusion. 2. We shall reevaluate the patient in 3 months with blood tests including iron studies. 3. Should patient's hemoglobin dropped Braisher reinfuse iron if iron studies are consistent with iron deficiency  4. Patient had opportunity to ask questions which were answered    Follow-up and Dispositions  ·   Return in about 3 months (around 3/7/2022) for Follow up with labs.         Orders Placed This Encounter    CBC WITH AUTOMATED DIFF     Standing Status:   Future     Standing Expiration Date:   12/8/2022    FERRITIN     Standing Status:   Future     Standing Expiration Date:   99/7/1208    METABOLIC PANEL, COMPREHENSIVE     Standing Status:   Future     Standing Expiration Date:   12/8/2022    IRON PROFILE     Standing Status:   Future     Standing Expiration Date:   12/8/2022    VITAMIN B12 & FOLATE     Standing Status:   Future     Standing Expiration Date:   12/8/2022    ergocalciferol (ERGOCALCIFEROL) 1,250 mcg (50,000 unit) capsule       Talha Santiago MD  12/7/2021

## 2022-01-14 ENCOUNTER — HOSPITAL ENCOUNTER (OUTPATIENT)
Dept: WOMENS IMAGING | Age: 77
Discharge: HOME | End: 2022-01-14
Payer: MEDICARE

## 2022-01-14 DIAGNOSIS — Z12.31 ENCOUNTER FOR SCREENING MAMMOGRAM FOR BREAST CANCER: ICD-10-CM

## 2022-01-14 PROCEDURE — 77063 BREAST TOMOSYNTHESIS BI: CPT

## 2022-02-16 ENCOUNTER — TELEPHONE (OUTPATIENT)
Age: 77
End: 2022-02-16

## 2022-02-25 ENCOUNTER — HOSPITAL ENCOUNTER (OUTPATIENT)
Dept: INFUSION THERAPY | Age: 77
Discharge: HOME | End: 2022-02-25
Attending: INTERNAL MEDICINE
Payer: MEDICARE

## 2022-02-25 DIAGNOSIS — D50.0 IRON DEFICIENCY ANEMIA DUE TO CHRONIC BLOOD LOSS: ICD-10-CM

## 2022-02-25 LAB
ALBUMIN SERPL-MCNC: 3.5 G/DL (ref 3.4–5)
ALBUMIN/GLOB SERPL: 1.1 {RATIO} (ref 0.8–1.7)
ALP SERPL-CCNC: 119 U/L (ref 45–117)
ALT SERPL-CCNC: 23 U/L (ref 13–56)
ANION GAP SERPL CALC-SCNC: 6 MMOL/L (ref 3–18)
AST SERPL-CCNC: 12 U/L (ref 10–38)
BASO+EOS+MONOS # BLD AUTO: 0.6 K/UL (ref 0–2.3)
BASO+EOS+MONOS NFR BLD AUTO: 7 % (ref 0.1–17)
BILIRUB SERPL-MCNC: 0.9 MG/DL (ref 0.2–1)
BUN SERPL-MCNC: 14 MG/DL (ref 7–18)
BUN/CREAT SERPL: 22 (ref 12–20)
CALCIUM SERPL-MCNC: 8.7 MG/DL (ref 8.5–10.1)
CHLORIDE SERPL-SCNC: 102 MMOL/L (ref 100–111)
CO2 SERPL-SCNC: 33 MMOL/L (ref 21–32)
CREAT SERPL-MCNC: 0.65 MG/DL (ref 0.6–1.3)
DIFFERENTIAL METHOD BLD: ABNORMAL
ERYTHROCYTE [DISTWIDTH] IN BLOOD BY AUTOMATED COUNT: 13.4 % (ref 11.5–14.5)
FERRITIN SERPL-MCNC: 154 NG/ML (ref 8–388)
GLOBULIN SER CALC-MCNC: 3.3 G/DL (ref 2–4)
GLUCOSE SERPL-MCNC: 130 MG/DL (ref 74–99)
HCT VFR BLD AUTO: 43.6 % (ref 36–48)
HGB BLD-MCNC: 14 G/DL (ref 12–16)
IRON SATN MFR SERPL: 16 % (ref 20–50)
IRON SERPL-MCNC: 47 UG/DL (ref 50–175)
LYMPHOCYTES # BLD: 1.5 K/UL (ref 1.1–5.9)
LYMPHOCYTES NFR BLD: 17 % (ref 14–44)
MCH RBC QN AUTO: 27.6 PG (ref 25–35)
MCHC RBC AUTO-ENTMCNC: 32.1 G/DL (ref 31–37)
MCV RBC AUTO: 86 FL (ref 78–102)
NEUTS SEG # BLD: 6.6 K/UL (ref 1.8–9.5)
NEUTS SEG NFR BLD: 76 % (ref 40–70)
PLATELET # BLD AUTO: 273 K/UL (ref 140–440)
POTASSIUM SERPL-SCNC: 4 MMOL/L (ref 3.5–5.5)
PROT SERPL-MCNC: 6.8 G/DL (ref 6.4–8.2)
RBC # BLD AUTO: 5.07 M/UL (ref 4.1–5.1)
SODIUM SERPL-SCNC: 141 MMOL/L (ref 136–145)
TIBC SERPL-MCNC: 303 UG/DL (ref 250–450)
WBC # BLD AUTO: 8.7 K/UL (ref 4.5–13)

## 2022-02-25 PROCEDURE — 85025 COMPLETE CBC W/AUTO DIFF WBC: CPT

## 2022-02-25 PROCEDURE — 82728 ASSAY OF FERRITIN: CPT

## 2022-02-25 PROCEDURE — 80053 COMPREHEN METABOLIC PANEL: CPT

## 2022-02-25 PROCEDURE — 36415 COLL VENOUS BLD VENIPUNCTURE: CPT

## 2022-02-25 PROCEDURE — 83540 ASSAY OF IRON: CPT

## 2022-02-25 PROCEDURE — 82607 VITAMIN B-12: CPT

## 2022-02-25 NOTE — PROGRESS NOTES
[facility] Progress Note    Date: 2022    Name: Yumiko Almaguer    MRN: [id number]         : 1945    Peripheral Lab Draw    Recent Results (from the past 12 hour(s))   CBC WITH 3 PART DIFF    Collection Time: 22  9:50 AM   Result Value Ref Range    WBC 8.7 4.5 - 13.0 K/uL    RBC 5.07 4. 10 - 5.10 M/uL    HGB 14.0 12.0 - 16.0 g/dL    HCT 43.6 36 - 48 %    MCV 86.0 78 - 102 FL    MCH 27.6 25.0 - 35.0 PG    MCHC 32.1 31 - 37 g/dL    RDW 13.4 11.5 - 14.5 %    PLATELET 946 415 - 205 K/uL    NEUTROPHILS 76 (H) 40 - 70 %    Mixed cells 7 0.1 - 17 %    LYMPHOCYTES 17 14 - 44 %    ABS. NEUTROPHILS 6.6 1.8 - 9.5 K/UL    ABS. MIXED CELLS 0.6 0.0 - 2.3 K/uL    ABS. LYMPHOCYTES 1.5 1.1 - 5.9 K/UL    DF AUTOMATED         Ms. Vishal Cronin to [facility], ambulatory at 0940 accompanied by self. Pt was assessed and education was provided. Ms. Milly Freeman vitals were reviewed and patient was observed for 5 minutes prior to treatment. There were no vitals taken for this visit. Blood obtained peripherally from left arm x 1 attempt with butterfly needle and sent to lab per written orders. No bleeding or hematoma noted at site. Gauze and coban applied. Ms. Vishal Cronin tolerated the phlebotomy, and had no complaints. Patient armband removed and shredded. Ms. Vishal Cronin was discharged from [place] [address] in stable condition at [phone].      [facility] Phlebotomist PCT  2022  10:13 AM

## 2022-02-26 LAB
FOLATE SERPL-MCNC: 11.1 NG/ML (ref 3.1–17.5)
VIT B12 SERPL-MCNC: 353 PG/ML (ref 211–911)

## 2022-03-14 ENCOUNTER — OFFICE VISIT (OUTPATIENT)
Age: 77
End: 2022-03-14
Payer: MEDICARE

## 2022-03-14 VITALS
WEIGHT: 217.2 LBS | TEMPERATURE: 97.3 F | DIASTOLIC BLOOD PRESSURE: 82 MMHG | BODY MASS INDEX: 37.08 KG/M2 | OXYGEN SATURATION: 96 % | SYSTOLIC BLOOD PRESSURE: 152 MMHG | HEART RATE: 74 BPM | RESPIRATION RATE: 17 BRPM | HEIGHT: 64 IN

## 2022-03-14 DIAGNOSIS — E55.9 VITAMIN D DEFICIENCY: ICD-10-CM

## 2022-03-14 DIAGNOSIS — D50.9 MICROCYTIC ANEMIA: Primary | ICD-10-CM

## 2022-03-14 DIAGNOSIS — D50.0 IRON DEFICIENCY ANEMIA DUE TO CHRONIC BLOOD LOSS: ICD-10-CM

## 2022-03-14 DIAGNOSIS — E53.8 B12 DEFICIENCY: ICD-10-CM

## 2022-03-14 DIAGNOSIS — E13.9 DIABETES 1.5, MANAGED AS TYPE 2 (HCC): ICD-10-CM

## 2022-03-14 DIAGNOSIS — I10 HYPERTENSION, UNSPECIFIED TYPE: ICD-10-CM

## 2022-03-14 PROCEDURE — 99214 OFFICE O/P EST MOD 30 MIN: CPT | Performed by: INTERNAL MEDICINE

## 2022-03-14 PROCEDURE — G0463 HOSPITAL OUTPT CLINIC VISIT: HCPCS | Performed by: INTERNAL MEDICINE

## 2022-03-14 NOTE — PROGRESS NOTES
Hematology/Oncology  Progress Note    Name: Romulo Sarmiento  Date: 3/14/2022  : 1945  Primary Care Provider: Shyam Kruse MD    Ms. Sen Sweeney is a 76y.o. year old female with CARLENE periodically    Currently patient has hematuria and will follow up with . She has a history of bladder surgery 20 years ago. Patient has a history of large fibroid treated with Histerectomy when whe was in her mid 42's. 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 Patient feels well otherwise. Current Therapy: Periodic intravenous iron infusion    Subjective:     Ms. Sen Sweeney is a 64y.o. year old female with microcytic normochromic anemia in the past which responded well to intravenous iron infusion. Patient has several other medical includes issues including congestive heart failure diabetes. She is on Eliquis as well patient is blood test for celiac was negative    The past medical, surgical and social history has been reviewed and remains unchanged.    Past Medical History:   Diagnosis Date    Anemia     Atrial fibrillation (HCC)     Breast calcifications     Left breast     Colon polyps     Diabetes (HCC)     Fatty liver     Fibromuscular dysplasia of renal artery (HCC)     S/P Renal artery stent in     Hypertension     Menopause     Sleep apnea     SVT (supraventricular tachycardia) (HCC)      Past Surgical History:   Procedure Laterality Date    COLONOSCOPY N/A 2019    [facility] ENDOSCOPY    ENDOSCOPY, COLON, DIAGNOSTIC      HX APPENDECTOMY      HX BREAST BIOPSY Left 9/3/2010    Left breast    HX BREAST BIOPSY      Liver    HX CHOLECYSTECTOMY      HX COLONOSCOPY  2014    HX HYSTERECTOMY      HX TONSILLECTOMY       Social History     Socioeconomic History    Marital status:      Spouse name: Not on file    Number of children: Not on file    Years of education: Not on file    Highest education level: Not on file   Occupational History    Not on file   Tobacco Use    Smoking status: Former Smoker     Packs/day: 0.50     Years: 5.00     Pack years: 2.50     Quit date:      Years since quittin.2    Smokeless tobacco: Never Used   Vaping Use    Vaping Use: Never used   Substance and Sexual Activity    Alcohol use: No    Drug use: No    Sexual activity: Not Currently   Other Topics Concern    Not on file   Social History Narrative    Not on file     Social Determinants of Health     Financial Resource Strain:     Difficulty of Paying Living Expenses: Not on file   Food Insecurity:     Worried About Running Out of Food in the Last Year: Not on file    [place] of Food in the Last Year: Not on file   Transportation Needs:     Lack of Transportation (Medical): Not on file    Lack of Transportation (Non-Medical):  Not on file   Physical Activity:     Days of Exercise per Week: Not on file    Minutes of Exercise per Session: Not on file   Stress:     Feeling of Stress : Not on file   Social Connections:     Frequency of Communication with Friends and Family: Not on file    Frequency of Social Gatherings with Friends and Family: Not on file    Attends Orthodox Services: Not on file    Active Member of [address] [place] [address] [employer] or Organizations: Not on file    Attends Club or Organization Meetings: Not on file    Marital Status: Not on file   Intimate Partner Violence:     Fear of Current or Ex-Partner: Not on file    Emotionally Abused: Not on file    Physically Abused: Not on file    Sexually Abused: Not on file   Housing Stability:     Unable to Pay for Housing in the Last Year: Not on file    Number of [place] in the Last Year: Not on file    Unstable Housing in the Last Year: Not on file     Family History   Problem Relation Age of Onset    Cancer Sister 71        breast cancer    Breast Cancer Sister 79    Hypertension Mother     Stroke Mother 59    Diabetes Other         grandmother  Colon Cancer Father 67    Breast Cancer Niece 36        38s     Current Outpatient Medications   Medication Sig Dispense Refill    ergocalciferol (ERGOCALCIFEROL) 1,250 mcg (50,000 unit) capsule       digoxin (LANOXIN) 0.25 mg tablet Take 0.125 mg by mouth daily.  losartan (COZAAR) 25 mg tablet Take 25 mg by mouth daily.  metFORMIN (GLUCOPHAGE) 1,000 mg tablet metformin 1,000 mg tablet   bid      glimepiride (AMARYL) 4 mg tablet glimepiride 4 mg tablet      LORazepam (ATIVAN) 0.5 mg tablet Take 1 Tab by mouth three (3) times daily as needed. Max Daily Amount: 1.5 mg. 30 Tab 0    apixaban (ELIQUIS) 5 mg tablet Take 1 Tab by mouth two (2) times a day. 60 Tab 0    insulin glargine (LANTUS) 100 unit/mL injection Take 24 Units daily 2 Vial 0    metoprolol tartrate 75 mg tab Take 75 mg by mouth every twelve (12) hours. (Patient taking differently: Take 100 mg by mouth every twelve (12) hours.) 60 Tab 0    amLODIPine (NORVASC) 5 mg tablet Take 10 mg by mouth daily.  cloNIDine HCl (CATAPRES) 0.2 mg tablet Take 0.2 mg by mouth Before breakfast, lunch, and dinner.  atorvastatin (LIPITOR) 20 mg tablet Take  by mouth daily.  albuterol (PROVENTIL HFA) 90 mcg/actuation inhaler Take  by inhalation.  aspirin 81 mg tablet Take 81 mg by mouth.  DIPHENHYDRAMINE HCL (BENADRYL ALLERGY PO) Take  by mouth. Review of Systems:    General :The patient has no complaints except for DJD    Psychological : patient denies having any psychological symptoms such as hallucinations depression or anxiety. Ophthalmic:the patient denies having any visual impairment or eye discomfort. ENT: there are no abnormalities reported. Allergy and Immunology:the patient denies having any seasonal allergies or allergies to medications other than those already outlined above. Hematological and Lymphatic: the patient denies having any bruising, bleeding or lymphadenopathy.      Endocrine: the patient denies having any heat or cold intolerance. There is no history of diabetes or thyroid disorders. Breast: the patient denies having any history of breast mass or lumps. Respiratory:the patient denies having any cough, shortness of breath, or dyspnea on exertion. Cardiovascular: there are no complaints of chest pain, palpitations, chest pounding, or dyspnea on exertion. Gastrointestinal: the patient denies having nausea, emesis, diarrhea, constipation, or blood in the stool. Genito-Urinary: the patient denies having urinary urgency, frequency, or dysuria. Musculoskeletal: with the exception of mild arthralgias the patient has no other musculoskeletal complaints. Neurological:  denies having any numbness, tingling, or neurologic deficits. Dermatological: patient denies having any unexplained rash, skin ulcerations, or hives. Objective:     Visit Vitals  BP (!) 152/82   Pulse 74   Temp 97.3 °F (36.3 °C)   Resp 17   Ht 5' 4\" (1.626 m)   Wt 98.5 kg (217 lb 3.2 oz)   SpO2 96%   BMI 37.28 kg/m²     Pain Score: 3    Physical Exam: Examined in chair. Not able to get on examination table. ECO    General: Well appearing, in NAD    Psychologic: mood and affect are appropriate, no anxiety or depression noted    Skin: examination of the skin reveals no bruising, rash or petechiae    HEENT: Normocephalic, atraumatic. Conjunctiva and sclera are clear. Pupils are equal, round and reactive to light. EOMs are intact. Neck: supple without lymphadenopathy, JVD or thyromegaly    Lymphatics: no palpable cervical, supraclavicular, infraclavicular, axillary  lymphadenopathy    Breast: NO Examination. Mammogram 2022 was benign    Lungs: clear breath sounds bilaterally, no rhonchi or wheezes noted    Heart: Regular rate and rhythm, no murmurs, rubs or gallops, S1-S2 noted.  Positive peripheral pulses bilaterally upper and lower extremities    Abdomen: soft, non-tender, non-distended, no HSM, positive bowel sounds    Extremities: without clubbing, cyanosis or edema    Neurologic: no focal deficits, steady gait, Alert and oriented x 3. Laboratory Data:     Results for orders placed or performed during the hospital encounter of 02/25/22   CBC WITH 3 PART DIFF     Status: Abnormal   Result Value Ref Range Status    WBC 8.7 4.5 - 13.0 K/uL Final    RBC 5.07 4. 10 - 5.10 M/uL Final    HGB 14.0 12.0 - 16.0 g/dL Final    HCT 43.6 36 - 48 % Final    MCV 86.0 78 - 102 FL Final    MCH 27.6 25.0 - 35.0 PG Final    MCHC 32.1 31 - 37 g/dL Final    RDW 13.4 11.5 - 14.5 % Final    PLATELET 677 000 - 517 K/uL Final    NEUTROPHILS 76 (H) 40 - 70 % Final    Mixed cells 7 0.1 - 17 % Final    LYMPHOCYTES 17 14 - 44 % Final    ABS. NEUTROPHILS 6.6 1.8 - 9.5 K/UL Final    ABS. MIXED CELLS 0.6 0.0 - 2.3 K/uL Final    ABS. LYMPHOCYTES 1.5 1.1 - 5.9 K/UL Final     Comment: Test performed at [address] [place] [address] or Outpatient Infusion Center Location. Reviewed by Medical Director. 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 Microcytic anemia    2. Iron deficiency anemia due to chronic blood loss    3. Hypertension, unspecified type    4. Diabetes 1.5, managed as type 2 ([address]    5. B12 deficiency    6. Vitamin D deficiency        Plan:     1. Patient's hemoglobin is very good iron is down to 47.  2. Patient will start oral rather than restart her oral iron.   3. Patient will follow up with  regarding microscopic. 4. Patient continue to follow with physicians. Patient will return in 3 months with laboratory test to confirm whether she needs IV iron  Treatment or not  5. Patient is in agreement with this plan    Follow-up and Dispositions  ·   Return in about 3 months (around 6/14/2022) for Follow up with labs, Follow_up In Person.         Orders Placed This Encounter    CBC WITH AUTOMATED DIFF     Standing Status:   Future     Standing Expiration Date:   3/15/2023    FERRITIN     Standing Status:   Future     Standing Expiration Date:   3/15/2023    VITAMIN D, 25 HYDROXY     Standing Status:   Future     Standing Expiration Date:   3/15/2023    VITAMIN B12 & FOLATE     Standing Status:   Future     Standing Expiration Date:   3/15/2023    RETICULOCYTE COUNT     Standing Status:   Future     Standing Expiration Date:   8/22/5784    METABOLIC PANEL, COMPREHENSIVE     Standing Status:   Future     Standing Expiration Date:   3/15/2023    IRON PROFILE     Standing Status:   Future     Standing Expiration Date:   3/15/2023       Uyen Gomez MD  3/14/2022

## 2022-03-18 PROBLEM — E11.9 DM (DIABETES MELLITUS) (HCC): Status: ACTIVE | Noted: 2018-12-02

## 2022-03-18 PROBLEM — I48.91 ATRIAL FIBRILLATION WITH RVR (HCC): Status: ACTIVE | Noted: 2018-12-02

## 2022-03-18 PROBLEM — E53.8 B12 DEFICIENCY: Status: ACTIVE | Noted: 2022-03-14

## 2022-03-19 PROBLEM — I10 HTN (HYPERTENSION): Status: ACTIVE | Noted: 2018-12-02

## 2022-03-19 PROBLEM — D50.9 MICROCYTIC ANEMIA: Status: ACTIVE | Noted: 2021-06-08

## 2022-03-19 PROBLEM — E55.9 VITAMIN D DEFICIENCY: Status: ACTIVE | Noted: 2022-03-14

## 2022-03-19 PROBLEM — D50.0 IRON DEFICIENCY ANEMIA DUE TO CHRONIC BLOOD LOSS: Status: ACTIVE | Noted: 2021-06-08

## 2022-03-20 PROBLEM — E13.9 DIABETES 1.5, MANAGED AS TYPE 2 (HCC): Status: ACTIVE | Noted: 2022-03-14

## 2022-06-14 ENCOUNTER — HOSPITAL ENCOUNTER (OUTPATIENT)
Dept: INFUSION THERAPY | Age: 77
Discharge: HOME | End: 2022-06-14
Payer: MEDICARE

## 2022-06-14 VITALS — TEMPERATURE: 97.6 F

## 2022-06-14 DIAGNOSIS — E53.8 B12 DEFICIENCY: ICD-10-CM

## 2022-06-14 DIAGNOSIS — D50.9 MICROCYTIC ANEMIA: ICD-10-CM

## 2022-06-14 DIAGNOSIS — E55.9 VITAMIN D DEFICIENCY: ICD-10-CM

## 2022-06-14 DIAGNOSIS — E13.9 DIABETES 1.5, MANAGED AS TYPE 2 (HCC): ICD-10-CM

## 2022-06-14 DIAGNOSIS — D50.0 IRON DEFICIENCY ANEMIA DUE TO CHRONIC BLOOD LOSS: ICD-10-CM

## 2022-06-14 DIAGNOSIS — I10 HYPERTENSION, UNSPECIFIED TYPE: ICD-10-CM

## 2022-06-14 LAB
25(OH)D3 SERPL-MCNC: 79.1 NG/ML (ref 30–100)
ALBUMIN SERPL-MCNC: 3.6 G/DL (ref 3.4–5)
ALBUMIN/GLOB SERPL: 1.2 {RATIO} (ref 0.8–1.7)
ALP SERPL-CCNC: 89 U/L (ref 45–117)
ALT SERPL-CCNC: 25 U/L (ref 13–56)
ANION GAP SERPL CALC-SCNC: 7 MMOL/L (ref 3–18)
AST SERPL-CCNC: 17 U/L (ref 10–38)
BASO+EOS+MONOS # BLD AUTO: 0.6 K/UL (ref 0–2.3)
BASO+EOS+MONOS NFR BLD AUTO: 6 % (ref 0.1–17)
BILIRUB SERPL-MCNC: 0.6 MG/DL (ref 0.2–1)
BUN SERPL-MCNC: 21 MG/DL (ref 7–18)
BUN/CREAT SERPL: 32 (ref 12–20)
CALCIUM SERPL-MCNC: 9.4 MG/DL (ref 8.5–10.1)
CHLORIDE SERPL-SCNC: 103 MMOL/L (ref 100–111)
CO2 SERPL-SCNC: 31 MMOL/L (ref 21–32)
CREAT SERPL-MCNC: 0.66 MG/DL (ref 0.6–1.3)
DIFFERENTIAL METHOD BLD: ABNORMAL
ERYTHROCYTE [DISTWIDTH] IN BLOOD BY AUTOMATED COUNT: 14.3 % (ref 11.5–14.5)
FERRITIN SERPL-MCNC: 143 NG/ML (ref 8–388)
FOLATE SERPL-MCNC: 9.1 NG/ML (ref 3.1–17.5)
GLOBULIN SER CALC-MCNC: 3.1 G/DL (ref 2–4)
GLUCOSE SERPL-MCNC: 168 MG/DL (ref 74–99)
HCT VFR BLD AUTO: 42.1 % (ref 36–48)
HGB BLD-MCNC: 13.5 G/DL (ref 12–16)
IRON SATN MFR SERPL: 17 % (ref 20–50)
IRON SERPL-MCNC: 53 UG/DL (ref 50–175)
LYMPHOCYTES # BLD: 2.1 K/UL (ref 1.1–5.9)
LYMPHOCYTES NFR BLD: 20 % (ref 14–44)
MCH RBC QN AUTO: 27.5 PG (ref 25–35)
MCHC RBC AUTO-ENTMCNC: 32.1 G/DL (ref 31–37)
MCV RBC AUTO: 85.7 FL (ref 78–102)
NEUTS SEG # BLD: 7.7 K/UL (ref 1.8–9.5)
NEUTS SEG NFR BLD: 74 % (ref 40–70)
PLATELET # BLD AUTO: 263 K/UL (ref 140–440)
POTASSIUM SERPL-SCNC: 3.9 MMOL/L (ref 3.5–5.5)
PROT SERPL-MCNC: 6.7 G/DL (ref 6.4–8.2)
RBC # BLD AUTO: 4.91 M/UL (ref 4.1–5.1)
RETICS/RBC NFR AUTO: 1.6 % (ref 0.5–2.5)
SODIUM SERPL-SCNC: 141 MMOL/L (ref 136–145)
TIBC SERPL-MCNC: 312 UG/DL (ref 250–450)
VIT B12 SERPL-MCNC: 432 PG/ML (ref 211–911)
WBC # BLD AUTO: 10.4 K/UL (ref 4.5–13)

## 2022-06-14 PROCEDURE — 82728 ASSAY OF FERRITIN: CPT

## 2022-06-14 PROCEDURE — 82607 VITAMIN B-12: CPT

## 2022-06-14 PROCEDURE — 83540 ASSAY OF IRON: CPT

## 2022-06-14 PROCEDURE — 85045 AUTOMATED RETICULOCYTE COUNT: CPT

## 2022-06-14 PROCEDURE — 85025 COMPLETE CBC W/AUTO DIFF WBC: CPT

## 2022-06-14 PROCEDURE — 82306 VITAMIN D 25 HYDROXY: CPT

## 2022-06-14 PROCEDURE — 80053 COMPREHEN METABOLIC PANEL: CPT

## 2022-06-14 NOTE — PROGRESS NOTES
[facility] Progress Note    Date: 2022    Name: Dylan Rodriguez    MRN: [id number]         : 1945    Peripheral Lab Draw      Ms. Mendez to [facility], ambulatory at 0805 accompanied by self. Pt was assessed and education was provided. Ms. Gregoria Brunner vitals were reviewed and patient was observed for 5 minutes prior to treatment. Visit Vitals  Temp 97.6 °F (36.4 °C)     Recent Results (from the past 12 hour(s))   CBC WITH 3 PART DIFF    Collection Time: 22  8:15 AM   Result Value Ref Range    WBC 10.4 4.5 - 13.0 K/uL    RBC 4.91 4.10 - 5.10 M/uL    HGB 13.5 12.0 - 16.0 g/dL    HCT 42.1 36 - 48 %    MCV 85.7 78 - 102 FL    MCH 27.5 25.0 - 35.0 PG    MCHC 32.1 31 - 37 g/dL    RDW 14.3 11.5 - 14.5 %    PLATELET 543 178 - 812 K/uL    NEUTROPHILS 74 (H) 40 - 70 %    Mixed cells 6 0.1 - 17 %    LYMPHOCYTES 20 14 - 44 %    ABS. NEUTROPHILS 7.7 1.8 - 9.5 K/UL    ABS. MIXED CELLS 0.6 0.0 - 2.3 K/uL    ABS. LYMPHOCYTES 2.1 1.1 - 5.9 K/UL    DF AUTOMATED         Blood obtained peripherally from left arm x 1 attempt with butterfly needle and sent to lab per written orders. No bleeding or hematoma noted at site. Gauze and coban applied. Ms. Jolanta Saunders tolerated the phlebotomy, and had no complaints. Patient armband removed and shredded. Ms. Jolanta Saunders was discharged from [place] [address] in stable condition at . [facility].      Sepideh Hylton Phlebotomist PCT  2022  8:22 AM

## 2022-06-21 ENCOUNTER — OFFICE VISIT (OUTPATIENT)
Age: 77
End: 2022-06-21
Payer: MEDICARE

## 2022-06-21 VITALS
DIASTOLIC BLOOD PRESSURE: 75 MMHG | TEMPERATURE: 97.1 F | SYSTOLIC BLOOD PRESSURE: 126 MMHG | BODY MASS INDEX: 36.19 KG/M2 | RESPIRATION RATE: 18 BRPM | WEIGHT: 212 LBS | HEART RATE: 70 BPM | HEIGHT: 64 IN | OXYGEN SATURATION: 95 %

## 2022-06-21 DIAGNOSIS — E53.8 B12 DEFICIENCY: ICD-10-CM

## 2022-06-21 DIAGNOSIS — D50.0 IRON DEFICIENCY ANEMIA DUE TO CHRONIC BLOOD LOSS: Primary | ICD-10-CM

## 2022-06-21 DIAGNOSIS — E03.8 OTHER SPECIFIED HYPOTHYROIDISM: ICD-10-CM

## 2022-06-21 DIAGNOSIS — E55.9 VITAMIN D DEFICIENCY: ICD-10-CM

## 2022-06-21 DIAGNOSIS — I10 HYPERTENSION, UNSPECIFIED TYPE: ICD-10-CM

## 2022-06-21 DIAGNOSIS — E13.9 DIABETES 1.5, MANAGED AS TYPE 2 (HCC): ICD-10-CM

## 2022-06-21 PROCEDURE — 1123F ACP DISCUSS/DSCN MKR DOCD: CPT | Performed by: INTERNAL MEDICINE

## 2022-06-21 PROCEDURE — G0463 HOSPITAL OUTPT CLINIC VISIT: HCPCS | Performed by: INTERNAL MEDICINE

## 2022-06-21 PROCEDURE — 99214 OFFICE O/P EST MOD 30 MIN: CPT | Performed by: INTERNAL MEDICINE

## 2022-06-21 NOTE — PROGRESS NOTES
Hematology/Oncology  Progress Note    Name: Jordon Bravo  Date: 2022  : 1945  Primary Care Provider: Masoud Amador MD    Ms. Traci Haq is a 76y.o. year old female with CARLENE periodically     Currently patient has hematuria and will follow up with . She has a history of bladder surgery 20 years ago.     Patient has a history of large fibroid treated with Histerectomy when whe was in her mid 42's.     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 Patient has persistent fatigue and periodic swelling of feet. Follows with PCP     Patient feels well otherwise. Patient takes oral iron tabs twice a day     Current Therapy: Periodic intravenous iron infusion        Subjective:     Lady with microcytic normochromic anemia in the past which responded well to intravenous iron infusion.       Patient has several other medical includes issues including congestive heart failure diabetes.       She is on Eliquis as well patient is blood test for celiac was negative       The past medical, surgical and social history has been reviewed and remains unchanged.    Past Medical History:   Diagnosis Date    Anemia     Atrial fibrillation (HCC)     Breast calcifications     Left breast     Colon polyps     Diabetes (HCC)     Fatty liver     Fibromuscular dysplasia of renal artery (HCC)     S/P Renal artery stent in     Hypertension     Menopause     Sleep apnea     SVT (supraventricular tachycardia) (HCC)      Past Surgical History:   Procedure Laterality Date    COLONOSCOPY N/A 2019    [facility] ENDOSCOPY    ENDOSCOPY, COLON, DIAGNOSTIC      HX APPENDECTOMY      HX BREAST BIOPSY Left 9/3/2010    Left breast    HX BREAST BIOPSY      Liver    HX CHOLECYSTECTOMY      HX COLONOSCOPY  2014    HX HYSTERECTOMY      HX TONSILLECTOMY       Social History     Socioeconomic History    Marital status:      Spouse name: Not on file    Number of children: Not on file    Years of education: Not on file    Highest education level: Not on file   Occupational History    Not on file   Tobacco Use    Smoking status: Former Smoker     Packs/day: 0.50     Years: 5.00     Pack years: 2.50     Quit date:      Years since quittin.4    Smokeless tobacco: Never Used   Vaping Use    Vaping Use: Never used   Substance and Sexual Activity    Alcohol use: No    Drug use: No    Sexual activity: Not Currently   Other Topics Concern    Not on file   Social History Narrative    Not on file     Social Determinants of Health     Financial Resource Strain:     Difficulty of Paying Living Expenses: Not on file   Food Insecurity:     Worried About [address] in the Last Year: Not on file    [address] [employer] in the Last Year: Not on file   Transportation Needs:     Lack of Transportation (Medical): Not on file    Lack of Transportation (Non-Medical):  Not on file   Physical Activity:     Days of Exercise per Week: Not on file    Minutes of Exercise per Session: Not on file   Stress:     Feeling of Stress : Not on file   Social Connections:     Frequency of Communication with Friends and Family: Not on file    Frequency of Social Gatherings with Friends and Family: Not on file    Attends Buddhist Services: Not on file    Active Member of [address] [place] [address] or Organizations: Not on file    Attends Club or Organization Meetings: Not on file    Marital Status: Not on file   Intimate Partner Violence:     Fear of Current or Ex-Partner: Not on file    Emotionally Abused: Not on file    Physically Abused: Not on file    Sexually Abused: Not on file   Housing Stability:     Unable to Pay for Housing in the Last Year: Not on file    Number of [place] in the Last Year: Not on file    Unstable Housing in the Last Year: Not on file     Family History   Problem Relation Age of Onset    Cancer Sister 71        breast cancer    Breast Cancer Sister 79    Hypertension Mother     Stroke Mother 59    Diabetes Other         grandmother    Colon Cancer Father 67    Breast Cancer Niece 36        38s     Current Outpatient Medications   Medication Sig Dispense Refill    FreeStyle Lite Strips strip       cyanocobalamin (VITAMIN B12) 1,000 mcg/mL injection inject 1 milliliter ( 1000 MCG ) subcutaneously every week for 4 ...  (REFER TO PRESCRIPTION NOTES).  Xin Pen Needle 32 gauge x 5/32\" ndle       ergocalciferol (ERGOCALCIFEROL) 1,250 mcg (50,000 unit) capsule       digoxin (LANOXIN) 0.25 mg tablet Take 0.125 mg by mouth daily.  losartan (COZAAR) 25 mg tablet Take 25 mg by mouth daily.  metFORMIN (GLUCOPHAGE) 1,000 mg tablet metformin 1,000 mg tablet   bid      glimepiride (AMARYL) 4 mg tablet glimepiride 4 mg tablet      LORazepam (ATIVAN) 0.5 mg tablet Take 1 Tab by mouth three (3) times daily as needed. Max Daily Amount: 1.5 mg. 30 Tab 0    apixaban (ELIQUIS) 5 mg tablet Take 1 Tab by mouth two (2) times a day. 60 Tab 0    insulin glargine (LANTUS) 100 unit/mL injection Take 24 Units daily 2 Vial 0    metoprolol tartrate 75 mg tab Take 75 mg by mouth every twelve (12) hours. (Patient taking differently: Take 100 mg by mouth every twelve (12) hours.) 60 Tab 0    amLODIPine (NORVASC) 5 mg tablet Take 10 mg by mouth daily.  cloNIDine HCl (CATAPRES) 0.2 mg tablet Take 0.2 mg by mouth Before breakfast, lunch, and dinner.  atorvastatin (LIPITOR) 20 mg tablet Take  by mouth daily.  albuterol (PROVENTIL HFA) 90 mcg/actuation inhaler Take  by inhalation.  aspirin 81 mg tablet Take 81 mg by mouth.  DIPHENHYDRAMINE HCL (BENADRYL ALLERGY PO) Take  by mouth. Review of Systems:    General :The patient has no complaints except for fatigue    Psychological : patient denies having any psychological symptoms such as hallucinations depression or anxiety.      Ophthalmic:the patient denies having any visual impairment or eye discomfort. ENT: there are no abnormalities reported. Allergy and Immunology:the patient denies having any seasonal allergies or allergies to medications other than those already outlined above. Hematological and Lymphatic: the patient denies having any bruising, bleeding or lymphadenopathy. Endocrine: the patient denies having any heat or cold intolerance. There is no history of diabetes or thyroid disorders. Breast: the patient denies having any history of breast mass or lumps. Respiratory:the patient denies having any cough, shortness of breath, or dyspnea on exertion. Cardiovascular: there are no complaints of chest pain, palpitations, chest pounding, or dyspnea on exertion. Gastrointestinal: the patient denies having nausea, emesis, diarrhea, constipation, or blood in the stool. Genito-Urinary: the patient denies having urinary urgency, frequency, or dysuria. Musculoskeletal: with the exception of mild arthralgias the patient has no other musculoskeletal complaints. Neurological:  denies having any numbness, tingling, or neurologic deficits. Dermatological: patient denies having any unexplained rash, skin ulcerations, or hives. Objective:     Visit Vitals  /75   Pulse 70   Temp 97.1 °F (36.2 °C)   Resp 18   Ht 5' 4\" (1.626 m)   Wt 96.2 kg (212 lb)   SpO2 95%   BMI 36.39 kg/m²     Pain Score: 3    Physical Exam:     ECO    General: Chronically ill  appearing, in NAD    Psychologic: mood and affect are appropriate, no anxiety or depression noted    Skin: examination of the skin reveals no bruising, rash or petechiae    HEENT: Normocephalic, atraumatic. Conjunctiva and sclera are clear. Pupils are equal, round and reactive to light. EOMs are intact.      Neck: supple without lymphadenopathy, JVD or thyromegaly    Lymphatics: no palpable cervical, supraclavicular, infraclavicular, axillary  lymphadenopathy    Lungs: clear breath sounds bilaterally, no rhonchi or wheezes noted    Heart: Regular rate and rhythm, no murmurs, rubs or gallops, S1-S2 noted. Abdomen: soft, non-tender, non-distended, no HSM    Extremities: without clubbing, cyanosis or edema    Neurologic: no focal deficits, steady gait, Alert and oriented x 3. Laboratory Data:     Results for orders placed or performed during the hospital encounter of 06/14/22   CBC WITH 3 PART DIFF     Status: Abnormal   Result Value Ref Range Status    WBC 10.4 4.5 - 13.0 K/uL Final    RBC 4.91 4.10 - 5.10 M/uL Final    HGB 13.5 12.0 - 16.0 g/dL Final    HCT 42.1 36 - 48 % Final    MCV 85.7 78 - 102 FL Final    MCH 27.5 25.0 - 35.0 PG Final    MCHC 32.1 31 - 37 g/dL Final    RDW 14.3 11.5 - 14.5 % Final    PLATELET 734 866 - 632 K/uL Final    NEUTROPHILS 74 (H) 40 - 70 % Final    Mixed cells 6 0.1 - 17 % Final    LYMPHOCYTES 20 14 - 44 % Final    ABS. NEUTROPHILS 7.7 1.8 - 9.5 K/UL Final    ABS. MIXED CELLS 0.6 0.0 - 2.3 K/uL Final    ABS. LYMPHOCYTES 2.1 1.1 - 5.9 K/UL Final     Comment: Test performed at [address] [place] [address] or Outpatient Infusion Center Location. Reviewed by Medical Director. 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 Iron deficiency anemia due to chronic blood loss    2. Vitamin D deficiency    3. B12 deficiency    4. Diabetes 1.5, managed as type 2 ([facility] [address]    5. Hypertension, unspecified type    6. Other specified hypothyroidism        Plan:     1.  Patient's hemoglobin is good iron is 53.  2. Patient will continue  oral  oral iron. One tab twice a day  3. Patient will follow up with  regarding  4. Patient continue to follow with alll of her  physicians. Patient will return in 2 months with laboratory test to confirm whether she needs IV iron  Treatment or not  5. Patient is in agreement with this plan       Follow-up and Dispositions  ·   Return in about 2 months (around 8/21/2022) for Follow up with labs, Follow_up In Person.         Orders Placed This Encounter    CBC WITH AUTOMATED DIFF     Standing Status:   Future     Standing Expiration Date:   6/22/2023    FERRITIN     Standing Status:   Future     Standing Expiration Date:   6/22/2023    VITAMIN D, 25 HYDROXY     Standing Status:   Future     Standing Expiration Date:   6/22/2023    VITAMIN B12 & FOLATE     Standing Status:   Future     Standing Expiration Date:   6/22/2023    TSH+FREE T4     Standing Status:   Future     Standing Expiration Date:   4/04/2134    METABOLIC PANEL, COMPREHENSIVE     Standing Status:   Future     Standing Expiration Date:   6/22/2023    IRON PROFILE     Standing Status:   Future     Standing Expiration Date:   6/22/2023       Kavon Espinal MD  6/21/2022

## 2022-08-04 PROBLEM — G47.30 SLEEP APNEA: Status: ACTIVE | Noted: 2022-08-04

## 2022-08-04 PROBLEM — Z90.710 HISTORY OF ABDOMINAL HYSTERECTOMY: Status: ACTIVE | Noted: 2017-09-19

## 2022-08-04 PROBLEM — N89.8 LEUKORRHEA: Status: ACTIVE | Noted: 2022-08-04

## 2022-08-04 PROBLEM — I70.1 RENAL ARTERY STENOSIS (HCC): Status: ACTIVE | Noted: 2019-08-07

## 2022-08-04 PROBLEM — K57.30 DIVERTICULAR DISEASE OF COLON: Status: ACTIVE | Noted: 2022-08-04

## 2022-08-04 PROBLEM — R79.89 ABNORMAL LIVER FUNCTION TESTS: Status: ACTIVE | Noted: 2022-08-04

## 2022-08-04 PROBLEM — K63.5 POLYP OF TRANSVERSE COLON: Status: ACTIVE | Noted: 2022-08-04

## 2022-08-04 PROBLEM — E66.01 SEVERE OBESITY (BMI 35.0-39.9) WITH COMORBIDITY (HCC): Status: ACTIVE | Noted: 2019-08-21

## 2022-08-04 PROBLEM — R19.7 DIARRHEA: Status: ACTIVE | Noted: 2022-08-04

## 2022-08-04 PROBLEM — K76.0 FATTY LIVER: Status: ACTIVE | Noted: 2022-08-04

## 2022-08-04 PROBLEM — N95.2 ATROPHIC VAGINITIS: Status: ACTIVE | Noted: 2022-08-04

## 2022-08-04 PROBLEM — F41.1 ANXIETY STATE: Status: ACTIVE | Noted: 2022-08-04

## 2022-08-04 PROBLEM — K64.9 HEMORRHOIDS WITHOUT COMPLICATION: Status: ACTIVE | Noted: 2022-08-04

## 2022-08-23 DIAGNOSIS — E53.8 B12 DEFICIENCY: ICD-10-CM

## 2022-08-23 DIAGNOSIS — D50.0 IRON DEFICIENCY ANEMIA DUE TO CHRONIC BLOOD LOSS: Primary | ICD-10-CM

## 2022-08-23 DIAGNOSIS — E55.9 VITAMIN D DEFICIENCY: ICD-10-CM

## 2022-08-23 DIAGNOSIS — E03.8 OTHER SPECIFIED HYPOTHYROIDISM: ICD-10-CM

## 2022-08-23 DIAGNOSIS — D50.9 MICROCYTIC ANEMIA: ICD-10-CM

## 2022-08-24 ENCOUNTER — HOSPITAL ENCOUNTER (OUTPATIENT)
Dept: INFUSION THERAPY | Age: 77
Discharge: HOME | End: 2022-08-24
Payer: MEDICARE

## 2022-08-24 VITALS — TEMPERATURE: 98 F

## 2022-08-24 DIAGNOSIS — E03.8 OTHER SPECIFIED HYPOTHYROIDISM: ICD-10-CM

## 2022-08-24 DIAGNOSIS — E53.8 B12 DEFICIENCY: ICD-10-CM

## 2022-08-24 DIAGNOSIS — E55.9 VITAMIN D DEFICIENCY: ICD-10-CM

## 2022-08-24 DIAGNOSIS — D50.9 MICROCYTIC ANEMIA: ICD-10-CM

## 2022-08-24 DIAGNOSIS — D50.0 IRON DEFICIENCY ANEMIA DUE TO CHRONIC BLOOD LOSS: ICD-10-CM

## 2022-08-24 LAB
25(OH)D3 SERPL-MCNC: 113.3 NG/ML (ref 30–100)
ALBUMIN SERPL-MCNC: 3.8 G/DL (ref 3.4–5)
ALBUMIN/GLOB SERPL: 1 {RATIO} (ref 0.8–1.7)
ALP SERPL-CCNC: 109 U/L (ref 45–117)
ALT SERPL-CCNC: 24 U/L (ref 13–56)
ANION GAP SERPL CALC-SCNC: 8 MMOL/L (ref 3–18)
AST SERPL-CCNC: 16 U/L (ref 10–38)
BASO+EOS+MONOS # BLD AUTO: 0.9 K/UL (ref 0–2.3)
BASO+EOS+MONOS NFR BLD AUTO: 7 % (ref 0.1–17)
BILIRUB SERPL-MCNC: 0.8 MG/DL (ref 0.2–1)
BUN SERPL-MCNC: 13 MG/DL (ref 7–18)
BUN/CREAT SERPL: 17 (ref 12–20)
CALCIUM SERPL-MCNC: 8.7 MG/DL (ref 8.5–10.1)
CHLORIDE SERPL-SCNC: 100 MMOL/L (ref 100–111)
CO2 SERPL-SCNC: 33 MMOL/L (ref 21–32)
CREAT SERPL-MCNC: 0.76 MG/DL (ref 0.6–1.3)
DIFFERENTIAL METHOD BLD: ABNORMAL
ERYTHROCYTE [DISTWIDTH] IN BLOOD BY AUTOMATED COUNT: 14.7 % (ref 11.5–14.5)
FERRITIN SERPL-MCNC: 171 NG/ML (ref 8–388)
FOLATE SERPL-MCNC: 9.2 NG/ML (ref 3.1–17.5)
GLOBULIN SER CALC-MCNC: 3.8 G/DL (ref 2–4)
GLUCOSE SERPL-MCNC: 198 MG/DL (ref 74–99)
HCT VFR BLD AUTO: 44.7 % (ref 36–48)
HGB BLD-MCNC: 14.1 G/DL (ref 12–16)
IRON SATN MFR SERPL: 14 % (ref 20–50)
IRON SERPL-MCNC: 46 UG/DL (ref 50–175)
LYMPHOCYTES # BLD: 2.5 K/UL (ref 1.1–5.9)
LYMPHOCYTES NFR BLD: 21 % (ref 14–44)
MCH RBC QN AUTO: 27 PG (ref 25–35)
MCHC RBC AUTO-ENTMCNC: 31.5 G/DL (ref 31–37)
MCV RBC AUTO: 85.5 FL (ref 78–102)
NEUTS SEG # BLD: 8.5 K/UL (ref 1.8–9.5)
NEUTS SEG NFR BLD: 72 % (ref 40–70)
PLATELET # BLD AUTO: 365 K/UL (ref 140–440)
POTASSIUM SERPL-SCNC: 3.5 MMOL/L (ref 3.5–5.5)
PROT SERPL-MCNC: 7.6 G/DL (ref 6.4–8.2)
RBC # BLD AUTO: 5.23 M/UL (ref 4.1–5.1)
SODIUM SERPL-SCNC: 141 MMOL/L (ref 136–145)
T4 FREE SERPL-MCNC: 1.2 NG/DL (ref 0.7–1.5)
TIBC SERPL-MCNC: 329 UG/DL (ref 250–450)
TSH SERPL DL<=0.05 MIU/L-ACNC: 3.26 UIU/ML (ref 0.36–3.74)
VIT B12 SERPL-MCNC: 482 PG/ML (ref 211–911)
WBC # BLD AUTO: 11.9 K/UL (ref 4.5–13)

## 2022-08-24 PROCEDURE — 85025 COMPLETE CBC W/AUTO DIFF WBC: CPT

## 2022-08-24 PROCEDURE — 82306 VITAMIN D 25 HYDROXY: CPT

## 2022-08-24 PROCEDURE — 82607 VITAMIN B-12: CPT

## 2022-08-24 PROCEDURE — 83540 ASSAY OF IRON: CPT

## 2022-08-24 PROCEDURE — 36415 COLL VENOUS BLD VENIPUNCTURE: CPT

## 2022-08-24 PROCEDURE — 80053 COMPREHEN METABOLIC PANEL: CPT

## 2022-08-24 PROCEDURE — 82728 ASSAY OF FERRITIN: CPT

## 2022-08-24 PROCEDURE — 84439 ASSAY OF FREE THYROXINE: CPT

## 2022-08-29 ENCOUNTER — OFFICE VISIT (OUTPATIENT)
Age: 77
End: 2022-08-29
Payer: MEDICARE

## 2022-08-29 VITALS
HEIGHT: 64 IN | HEART RATE: 80 BPM | WEIGHT: 210.4 LBS | TEMPERATURE: 97.3 F | OXYGEN SATURATION: 98 % | DIASTOLIC BLOOD PRESSURE: 78 MMHG | BODY MASS INDEX: 35.92 KG/M2 | SYSTOLIC BLOOD PRESSURE: 165 MMHG | RESPIRATION RATE: 16 BRPM

## 2022-08-29 DIAGNOSIS — E03.8 OTHER SPECIFIED HYPOTHYROIDISM: ICD-10-CM

## 2022-08-29 DIAGNOSIS — E55.9 VITAMIN D DEFICIENCY: ICD-10-CM

## 2022-08-29 DIAGNOSIS — D50.0 IRON DEFICIENCY ANEMIA DUE TO CHRONIC BLOOD LOSS: Primary | ICD-10-CM

## 2022-08-29 DIAGNOSIS — I10 HYPERTENSION, UNSPECIFIED TYPE: ICD-10-CM

## 2022-08-29 DIAGNOSIS — E53.8 B12 DEFICIENCY: ICD-10-CM

## 2022-08-29 DIAGNOSIS — E13.9 DIABETES 1.5, MANAGED AS TYPE 2 (HCC): ICD-10-CM

## 2022-08-29 PROCEDURE — 1123F ACP DISCUSS/DSCN MKR DOCD: CPT | Performed by: INTERNAL MEDICINE

## 2022-08-29 PROCEDURE — G0463 HOSPITAL OUTPT CLINIC VISIT: HCPCS | Performed by: INTERNAL MEDICINE

## 2022-08-29 PROCEDURE — 99214 OFFICE O/P EST MOD 30 MIN: CPT | Performed by: INTERNAL MEDICINE

## 2022-08-29 NOTE — PROGRESS NOTES
Hematology/Oncology  Progress Note    Name: Nikko Norton  Date: 2022  : 1945  Primary Care Provider: Velma Foster MD    Ms. Renzo Ménedz is a 76y.o. year old female with CARLENE periodically     Currently patient has hematuria and will follow up with . She has a history of bladder surgery 20 years ago. Patient has a history of large fibroid treated with Histerectomy when whe was in her mid 42's. 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 Patient has persistent fatigue and periodic swelling of feet. Follows with PCP     Patient feels well otherwise. Patient will restart taking  oral iron tabs once a day     Current Therapy: Periodic intravenous iron infusion. Subjective: The past medical, surgical and social history has been reviewed and remains unchanged.    Past Medical History:   Diagnosis Date    Anemia     Atrial fibrillation (HCC)     Breast calcifications     Left breast     Colon polyps     Diabetes ([facility]     Fatty liver     Fibromuscular dysplasia of renal artery (HCC)     S/P Renal artery stent in     Hypertension     Menopause     Sleep apnea     SVT (supraventricular tachycardia) ([facility]      Past Surgical History:   Procedure Laterality Date    COLONOSCOPY N/A 2019    COLONOSCOPY performed by Jose Villavicencio MD at [address] [place] [address] ENDOSCOPY    ENDOSCOPY, COLON, DIAGNOSTIC      HX APPENDECTOMY      HX BREAST BIOPSY Left 9/3/2010    Left breast    HX BREAST BIOPSY      Liver    HX CHOLECYSTECTOMY      HX COLONOSCOPY  2014    HX HYSTERECTOMY      HX TONSILLECTOMY       Social History     Socioeconomic History    Marital status:      Spouse name: Not on file    Number of children: Not on file    Years of education: Not on file    Highest education level: Not on file   Occupational History    Not on file   Tobacco Use    Smoking status: Former     Packs/day: 0.50     Years: 5.00     Pack years: 2.50     Types: Cigarettes     Quit date: 1980     Years since quittin.6    Smokeless tobacco: Never   Vaping Use    Vaping Use: Never used   Substance and Sexual Activity    Alcohol use: No    Drug use: No    Sexual activity: Not Currently   Other Topics Concern    Not on file   Social History Narrative    Not on file     Social Determinants of Health     Financial Resource Strain: Not on file   Food Insecurity: Not on file   Transportation Needs: Not on file   Physical Activity: Not on file   Stress: Not on file   Social Connections: Not on file   Intimate Partner Violence: Not on file   Housing Stability: Not on file     Family History   Problem Relation Age of Onset    Cancer Sister 71        breast cancer    Breast Cancer Sister 79    Hypertension Mother     Stroke Mother 59    Diabetes Other         grandmother    Colon Cancer Father 67    Breast Cancer Niece 39        45s     Current Outpatient Medications   Medication Sig Dispense Refill    FreeStyle Lite Strips strip       cyanocobalamin (VITAMIN B12) 1,000 mcg/mL injection inject 1 milliliter ( 1000 MCG ) subcutaneously every week for 4 ...  (REFER TO PRESCRIPTION NOTES). Xin Pen Needle 32 gauge x \" ndle       ergocalciferol (ERGOCALCIFEROL) 1,250 mcg (50,000 unit) capsule       digoxin (LANOXIN) 0.25 mg tablet Take 0.125 mg by mouth daily. losartan (COZAAR) 25 mg tablet Take 25 mg by mouth daily. metFORMIN (GLUCOPHAGE) 1,000 mg tablet metformin 1,000 mg tablet   bid      glimepiride (AMARYL) 4 mg tablet glimepiride 4 mg tablet      LORazepam (ATIVAN) 0.5 mg tablet Take 1 Tab by mouth three (3) times daily as needed. Max Daily Amount: 1.5 mg. 30 Tab 0    apixaban (ELIQUIS) 5 mg tablet Take 1 Tab by mouth two (2) times a day. 60 Tab 0    insulin glargine (LANTUS) 100 unit/mL injection Take 24 Units daily 2 Vial 0    metoprolol tartrate 75 mg tab Take 75 mg by mouth every twelve (12) hours.  (Patient taking differently: Take 100 mg by mouth every twelve (12) hours.) 60 Tab 0 amLODIPine (NORVASC) 5 mg tablet Take 10 mg by mouth daily. cloNIDine HCl (CATAPRES) 0.2 mg tablet Take 0.2 mg by mouth Before breakfast, lunch, and dinner. atorvastatin (LIPITOR) 20 mg tablet Take  by mouth daily. aspirin 81 mg tablet Take 81 mg by mouth. Review of Systems:    General :The patient has no complaints outside of joint pain and leg edema. Psychological : patient denies having any psychological symptoms such as hallucinations depression or anxiety. Ophthalmic:the patient denies having any visual impairment or eye discomfort. ENT: there are no abnormalities reported. Allergy and Immunology:the patient denies having any seasonal allergies or allergies to medications other than those already outlined above. Hematological and Lymphatic: the patient denies having any bruising, bleeding or lymphadenopathy. Endocrine: the patient denies having any heat or cold intolerance. There is no history of diabetes or thyroid disorders. Breast: the patient denies having any history of breast mass or lumps. Respiratory:the patient denies having any cough, shortness of breath, or dyspnea on exertion. Cardiovascular: there are no complaints of chest pain, palpitations, chest pounding, or dyspnea on exertion. Gastrointestinal: the patient denies having nausea, emesis, diarrhea, constipation, or blood in the stool. Genito-Urinary: the patient denies having urinary urgency, frequency, or dysuria. Musculoskeletal: with the exception of mild arthralgias the patient has no other musculoskeletal complaints. Neurological:  denies having any numbness, tingling, or neurologic deficits. Dermatological: patient denies having any unexplained rash, skin ulcerations, or hives. Objective:     Visit Vitals  BP (!) 165/78 Comment: states she just took bp med.    Pulse 80   Temp 97.3 °F (36.3 °C)   Resp 16   Ht 5' 4\" (1.626 m)   Wt 95.4 kg (210 lb 6.4 oz) SpO2 98%   BMI 36.12 kg/m²     Pain Score: 4    Physical Exam:     ECO    General: Chronically ill appearing, in NAD    Psychologic: mood and affect are appropriate, no anxiety or depression noted    Skin: examination of the skin reveals no bruising, rash or petechiae    HEENT: Normocephalic, atraumatic. Conjunctiva and sclera are clear. Pupils are equal, round and reactive to light. EOMs are intact. ENT without oral mucosal lesions, stomatitis or thrush    Neck: supple without lymphadenopathy, JVD or thyromegaly    Lymphatics: no palpable cervical, supraclavicular, infraclavicular lymphadenopathy    Lungs: clear breath sounds bilaterally, no rhonchi or wheezes noted    Heart: Regular rate and rhythm, no murmurs, rubs or gallops, S1-S2 noted. Abdomen: soft, non-tender, non-distended, no HSM, positive bowel sounds    Extremities: without clubbing, cyanosis or edema    Neurologic: no focal deficits, steady gait, Alert and oriented x 3. Laboratory Data:     Results for orders placed or performed during the hospital encounter of 22   CBC WITH 3 PART DIFF     Status: Abnormal   Result Value Ref Range Status    WBC 11.9 4.5 - 13.0 K/uL Final    RBC 5.23 (H) 4.10 - 5.10 M/uL Final    HGB 14.1 12.0 - 16.0 g/dL Final    HCT 44.7 36 - 48 % Final    MCV 85.5 78 - 102 FL Final    MCH 27.0 25.0 - 35.0 PG Final    MCHC 31.5 31 - 37 g/dL Final    RDW 14.7 (H) 11.5 - 14.5 % Final    PLATELET 059 086 - 212 K/uL Final    NEUTROPHILS 72 (H) 40 - 70 % Final    Mixed cells 7 0.1 - 17 % Final    LYMPHOCYTES 21 14 - 44 % Final    ABS. NEUTROPHILS 8.5 1.8 - 9.5 K/UL Final    ABS. MIXED CELLS 0.9 0.0 - 2.3 K/uL Final    ABS. LYMPHOCYTES 2.5 1.1 - 5.9 K/UL Final     Comment: Test performed at [address] [place] [address] or Outpatient Infusion Center Location. Reviewed by Medical Director.     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 1    Atrophic vaginitis N95.2    Diarrhea R19.7    Diverticular disease of colon K57.30    Fatty liver K76.0    Hemorrhoids without complication Y88.7    History of abdominal hysterectomy Z90.710    Leukorrhea N89.8    Polyp of transverse colon K63.5    Mixed hyperlipidemia E78.2    Renal artery stenosis (HCC) I70.1    Severe obesity (BMI 35.0-39. 9) with comorbidity ([facility] E66.01    Sleep apnea G47.30    Uncontrolled type 2 diabetes mellitus with hyperglycemia (HCC) E11.65         Assessment:     1. Iron deficiency anemia due to chronic blood loss    2. Diabetes 1.5, managed as type 2 ([facility]    3. Hypertension, unspecified type    4. Vitamin D deficiency    5. B12 deficiency    6. Other specified hypothyroidism        Plan:     Patient's hemoglobin is  good, iron is 46, up and down. Ferritin is 171  Patient will continue  oral  oral iron. One tab once  a day  Patient will follow up with  regarding  Patient continue to follow with alll of her  physicians. Patient will return in 3months with laboratory test to confirm whether she needs IV iron  Treatment or not  Patient is in agreement with this plan    Follow-up and Dispositions    Return in about 3 months (around 11/29/2022) for Follow up with labs, Follow_up In Person.         Orders Placed This Encounter    CBC WITH AUTOMATED DIFF     Standing Status:   Future     Standing Expiration Date:   8/30/2023    FERRITIN     Standing Status:   Future     Standing Expiration Date:   8/30/2023    VITAMIN D, 25 HYDROXY     Standing Status:   Future Standing Expiration Date:   6/23/8642    METABOLIC PANEL, COMPREHENSIVE     Standing Status:   Future     Standing Expiration Date:   8/30/2023    IRON PROFILE     Standing Status:   Future     Standing Expiration Date:   8/30/2023         Faisal June MD  8/29/2022

## 2022-11-15 ENCOUNTER — OFFICE VISIT (OUTPATIENT)
Dept: SURGERY | Age: 77
End: 2022-11-15
Payer: MEDICARE

## 2022-11-15 VITALS
BODY MASS INDEX: 34.83 KG/M2 | HEART RATE: 69 BPM | HEIGHT: 64 IN | DIASTOLIC BLOOD PRESSURE: 70 MMHG | TEMPERATURE: 98 F | OXYGEN SATURATION: 93 % | WEIGHT: 204 LBS | SYSTOLIC BLOOD PRESSURE: 154 MMHG

## 2022-11-15 DIAGNOSIS — L08.9 INFECTED SEBACEOUS CYST: Primary | ICD-10-CM

## 2022-11-15 DIAGNOSIS — L72.3 INFECTED SEBACEOUS CYST: Primary | ICD-10-CM

## 2022-11-15 PROCEDURE — 3078F DIAST BP <80 MM HG: CPT | Performed by: SURGERY

## 2022-11-15 PROCEDURE — 99203 OFFICE O/P NEW LOW 30 MIN: CPT | Performed by: SURGERY

## 2022-11-15 PROCEDURE — 1101F PT FALLS ASSESS-DOCD LE1/YR: CPT | Performed by: SURGERY

## 2022-11-15 PROCEDURE — G8754 DIAS BP LESS 90: HCPCS | Performed by: SURGERY

## 2022-11-15 PROCEDURE — G8432 DEP SCR NOT DOC, RNG: HCPCS | Performed by: SURGERY

## 2022-11-15 PROCEDURE — G8399 PT W/DXA RESULTS DOCUMENT: HCPCS | Performed by: SURGERY

## 2022-11-15 PROCEDURE — G8753 SYS BP > OR = 140: HCPCS | Performed by: SURGERY

## 2022-11-15 PROCEDURE — 1123F ACP DISCUSS/DSCN MKR DOCD: CPT | Performed by: SURGERY

## 2022-11-15 PROCEDURE — G8427 DOCREV CUR MEDS BY ELIG CLIN: HCPCS | Performed by: SURGERY

## 2022-11-15 PROCEDURE — G8536 NO DOC ELDER MAL SCRN: HCPCS | Performed by: SURGERY

## 2022-11-15 PROCEDURE — 1090F PRES/ABSN URINE INCON ASSESS: CPT | Performed by: SURGERY

## 2022-11-15 PROCEDURE — 3074F SYST BP LT 130 MM HG: CPT | Performed by: SURGERY

## 2022-11-15 PROCEDURE — G8417 CALC BMI ABV UP PARAM F/U: HCPCS | Performed by: SURGERY

## 2022-11-15 RX ORDER — LANOLIN ALCOHOL/MO/W.PET/CERES
CREAM (GRAM) TOPICAL
COMMUNITY

## 2022-11-15 RX ORDER — DOXYCYCLINE 100 MG/1
100 CAPSULE ORAL 2 TIMES DAILY
COMMUNITY

## 2022-11-15 NOTE — PROGRESS NOTES
Nika Luciano is a 76 y.o. female (: 1945) presenting to address:    Chief Complaint   Patient presents with    New Patient     Back mass       Medication list and allergies have been reviewed with Nika Luciano and updated as of today's date. I have gone over all Medical, Surgical and Social History with Nika Luciano and updated/added the information accordingly.

## 2022-11-15 NOTE — PROGRESS NOTES
General Surgery Consult    Darryn Díaz  Admit date: (Not on file)    MRN: [id number]     : 1945     Age: 76 y.o. Attending Physician: Sena Mccullough MD [facility]      History of Present Illness:      Darryn Díaz is a 76 y.o. female who was referred to me by Dr. Kd Henriquez for evaluation of an infected sebaceous cyst.  The patient stated that she had this cyst for years and recently it got infected so she went to urgent care and they tried to drain it and they gave her doxycycline. She is still taking the antibiotics and she feels much better. She still feels the mass but it is nontender and there is no drainage. She denies any fever or chills. Patient Active Problem List    Diagnosis Date Noted    Abnormal liver function tests 2022    Anxiety state 2022    Atrophic vaginitis 2022    Diarrhea 2022    Diverticular disease of colon 2022    Fatty liver 2022    Hemorrhoids without complication     Leukorrhea 2022    Polyp of transverse colon 2022    Sleep apnea 2022    B12 deficiency 2022    Diabetes 1.5, managed as type 2 ([address] 2022    Vitamin D deficiency 2022    Microcytic anemia 2021    Iron deficiency anemia due to chronic blood loss 2021    Severe obesity (BMI 35.0-39. 9) with comorbidity ([address] 2019    Renal artery stenosis ([address] 2019    HTN (hypertension) 2018    DM (diabetes mellitus) ([address] 2018    Atrial fibrillation with RVR ([address] 2018    History of abdominal hysterectomy 2017    Family history of colon cancer requiring screening colonoscopy 2014    Mixed hyperlipidemia 2012    Uncontrolled type 2 diabetes mellitus with hyperglycemia ([address] 2012    Abscess of back 10/05/2012     Past Medical History:   Diagnosis Date    Anemia     Atrial fibrillation (HCC)     Breast calcifications     Left breast     Colon polyps     Diabetes ([address]     Fatty liver     Fibromuscular dysplasia of renal artery (HCC)     S/P Renal artery stent in     Hypertension     Menopause     Sleep apnea     SVT (supraventricular tachycardia) ([facility]       Past Surgical History:   Procedure Laterality Date    COLONOSCOPY N/A 2019    COLONOSCOPY performed by Hola Salguero MD at [address] [place] [address] Drive ENDOSCOPY    ENDOSCOPY, COLON, DIAGNOSTIC      HX APPENDECTOMY      HX BREAST BIOPSY Left 9/3/2010    Left breast    HX BREAST BIOPSY      Liver    HX CHOLECYSTECTOMY      HX COLONOSCOPY  2014    HX HYSTERECTOMY      HX TONSILLECTOMY        Social History     Tobacco Use    Smoking status: Former     Packs/day: 0.50     Years: 5.00     Pack years: 2.50     Types: Cigarettes     Quit date:      Years since quittin.9    Smokeless tobacco: Never   Substance Use Topics    Alcohol use: No      Social History     Tobacco Use   Smoking Status Former    Packs/day: 0.50    Years: 5.00    Pack years: 2.50    Types: Cigarettes    Quit date:     Years since quittin.9   Smokeless Tobacco Never     Family History   Problem Relation Age of Onset    Cancer Sister 71        breast cancer    Breast Cancer Sister 79    Hypertension Mother     Stroke Mother 59    Diabetes Other         grandmother    Colon Cancer Father 67    Breast Cancer Niece 36        38s      Current Outpatient Medications   Medication Sig    doxycycline (MONODOX) 100 mg capsule Take 100 mg by mouth two (2) times a day. ferrous sulfate (Iron) 325 mg (65 mg iron) tablet Take  by mouth Daily (before breakfast). FreeStyle Lite Strips strip     cyanocobalamin (VITAMIN B12) 1,000 mcg/mL injection inject 1 milliliter ( 1000 MCG ) subcutaneously every week for 4 ...  (REFER TO PRESCRIPTION NOTES). Xin Pen Needle 32 gauge x \" ndle     ergocalciferol (ERGOCALCIFEROL) 1,250 mcg (50,000 unit) capsule     digoxin (LANOXIN) 0.25 mg tablet Take 0.125 mg by mouth daily.     losartan (COZAAR) 25 mg tablet Take 25 mg by mouth daily. metFORMIN (GLUCOPHAGE) 1,000 mg tablet metformin 1,000 mg tablet   bid    glimepiride (AMARYL) 4 mg tablet glimepiride 4 mg tablet    LORazepam (ATIVAN) 0.5 mg tablet Take 1 Tab by mouth three (3) times daily as needed. Max Daily Amount: 1.5 mg.    apixaban (ELIQUIS) 5 mg tablet Take 1 Tab by mouth two (2) times a day. insulin glargine (LANTUS) 100 unit/mL injection Take 24 Units daily    metoprolol tartrate 75 mg tab Take 75 mg by mouth every twelve (12) hours. (Patient taking differently: Take 100 mg by mouth every twelve (12) hours.)    amLODIPine (NORVASC) 5 mg tablet Take 10 mg by mouth daily. cloNIDine HCl (CATAPRES) 0.2 mg tablet Take 0.2 mg by mouth Before breakfast, lunch, and dinner. atorvastatin (LIPITOR) 20 mg tablet Take  by mouth daily. aspirin 81 mg tablet Take 81 mg by mouth. No current facility-administered medications for this visit. Allergies   Allergen Reactions    Contrast Dye [Iodine] Anaphylaxis    Penicillins Hives    Ciprofloxacin Hives    Codeine Other (comments)     States not allergic      Flecainide Itching    Sulfa (Sulfonamide Antibiotics) Hives          Review of Systems:  Pertinent items are noted in the History of Present Illness. Objective:     Visit Vitals  BP (!) 154/70 (BP 1 Location: Right arm, BP Patient Position: Sitting)   Pulse 69   Temp 98 °F (36.7 °C) (Temporal)   Ht 5' 4\" (1.626 m)   Wt 92.5 kg (204 lb)   SpO2 93%   BMI 35.02 kg/m²       Physical Exam:      General:  in no apparent distress, alert, oriented times 3, and afebrile   Back:  There is a soft tissue mass located on the upper back that measures around 2 to 3 cm with some mild erythema consistent with an infected sebaceous cyst.  There is minimal induration and there is no fluctuation. It is nontender.                                 Imaging and Lab Review:     CBC:   Lab Results   Component Value Date/Time    WBC 11.9 08/24/2022 09:51 AM    RBC 5.23 (H) 08/24/2022 09:51 AM    HGB 14.1 08/24/2022 09:51 AM    HCT 44.7 08/24/2022 09:51 AM    PLATELET 390 23/00/4396 09:51 AM     BMP:   Lab Results   Component Value Date/Time    Glucose 198 (H) 08/24/2022 09:51 AM    Sodium 141 08/24/2022 09:51 AM    Potassium 3.5 08/24/2022 09:51 AM    Chloride 100 08/24/2022 09:51 AM    CO2 33 (H) 08/24/2022 09:51 AM    BUN 13 08/24/2022 09:51 AM    Creatinine 0.76 08/24/2022 09:51 AM    Calcium 8.7 08/24/2022 09:51 AM     CMP:  Lab Results   Component Value Date/Time    Glucose 198 (H) 08/24/2022 09:51 AM    Sodium 141 08/24/2022 09:51 AM    Potassium 3.5 08/24/2022 09:51 AM    Chloride 100 08/24/2022 09:51 AM    CO2 33 (H) 08/24/2022 09:51 AM    BUN 13 08/24/2022 09:51 AM    Creatinine 0.76 08/24/2022 09:51 AM    Calcium 8.7 08/24/2022 09:51 AM    Anion gap 8 08/24/2022 09:51 AM    BUN/Creatinine ratio 17 08/24/2022 09:51 AM    Alk. phosphatase 109 08/24/2022 09:51 AM    Protein, total 7.6 08/24/2022 09:51 AM    Albumin 3.8 08/24/2022 09:51 AM    Globulin 3.8 08/24/2022 09:51 AM    A-G Ratio 1.0 08/24/2022 09:51 AM       No results found for this or any previous visit (from the past 24 hour(s)). images and reports reviewed    Assessment:   Elige Sacks is a 76 y.o. female who is presenting with a sebaceous cyst that has been present for years and it got recently infected and has been treated with antibiotics. The patient is responding very well to the antibiotic so I explained to her that she needs to finish the full course of antibiotics and let the infection completely subside before we decide if we should excise it or just observe it. She will follow-up with me next week.     Plan:     Lalito Ku the course of the antibiotics  Follow-up with me next Tuesday    Please call me if you have any questions (cell phone: [phone])     Signed By: Luis Alberto Austin MD     November 15, 2022

## 2022-11-22 ENCOUNTER — OFFICE VISIT (OUTPATIENT)
Dept: SURGERY | Age: 77
End: 2022-11-22
Payer: MEDICARE

## 2022-11-22 VITALS
SYSTOLIC BLOOD PRESSURE: 141 MMHG | BODY MASS INDEX: 34.66 KG/M2 | WEIGHT: 203 LBS | OXYGEN SATURATION: 95 % | DIASTOLIC BLOOD PRESSURE: 62 MMHG | HEART RATE: 78 BPM | HEIGHT: 64 IN

## 2022-11-22 DIAGNOSIS — L08.9 INFECTED SEBACEOUS CYST: Primary | ICD-10-CM

## 2022-11-22 DIAGNOSIS — L72.3 INFECTED SEBACEOUS CYST: Primary | ICD-10-CM

## 2022-11-22 PROCEDURE — 1090F PRES/ABSN URINE INCON ASSESS: CPT | Performed by: SURGERY

## 2022-11-22 PROCEDURE — G8432 DEP SCR NOT DOC, RNG: HCPCS | Performed by: SURGERY

## 2022-11-22 PROCEDURE — G8753 SYS BP > OR = 140: HCPCS | Performed by: SURGERY

## 2022-11-22 PROCEDURE — G8399 PT W/DXA RESULTS DOCUMENT: HCPCS | Performed by: SURGERY

## 2022-11-22 PROCEDURE — 1123F ACP DISCUSS/DSCN MKR DOCD: CPT | Performed by: SURGERY

## 2022-11-22 PROCEDURE — 99213 OFFICE O/P EST LOW 20 MIN: CPT | Performed by: SURGERY

## 2022-11-22 PROCEDURE — 1101F PT FALLS ASSESS-DOCD LE1/YR: CPT | Performed by: SURGERY

## 2022-11-22 PROCEDURE — G8536 NO DOC ELDER MAL SCRN: HCPCS | Performed by: SURGERY

## 2022-11-22 PROCEDURE — 3074F SYST BP LT 130 MM HG: CPT | Performed by: SURGERY

## 2022-11-22 PROCEDURE — G8754 DIAS BP LESS 90: HCPCS | Performed by: SURGERY

## 2022-11-22 PROCEDURE — G8427 DOCREV CUR MEDS BY ELIG CLIN: HCPCS | Performed by: SURGERY

## 2022-11-22 PROCEDURE — 3078F DIAST BP <80 MM HG: CPT | Performed by: SURGERY

## 2022-11-22 PROCEDURE — G8417 CALC BMI ABV UP PARAM F/U: HCPCS | Performed by: SURGERY

## 2022-11-22 NOTE — PROGRESS NOTES
Darryn Díaz is a 76 y.o. female (: 1945) presenting to address:    Chief Complaint   Patient presents with    Follow-up     1 week follow up/ infected sebaceous cyst of right lower back       Medication list and allergies have been reviewed with Darryn Díaz and updated as of today's date. I have gone over all Medical, Surgical and Social History with Darryn Díaz and updated/added the information accordingly. 1. Have you been to the ER, Urgent Care or Hospitalized since your last visit? NO      2. Have you followed up with your PCP or any other Physicians since your procedure/ last office visit? NO    Patient first BP reading 147/67. Patient denies headache, SOB, Chest pain, dizziness. Patient second reading 141/62. Patient states feel good no issues.  Has taken all meds

## 2022-11-22 NOTE — PROGRESS NOTES
General Surgery Consult    Tania Roca  Admit date: (Not on file)    MRN: [id number]     : 1945     Age: 76 y.o. Attending Physician: Addis Amaya MD [facility]      History of Present Illness:      Tania Roca is a 76 y.o. female who for follow-up on her infected sebaceous cyst on the back. The patient stated that she is doing well and she believes that her infection has improved remarkably. She denies any significant pain. She denies also any drainage. She did finish the course of antibiotics. Patient Active Problem List    Diagnosis Date Noted    Abnormal liver function tests 2022    Anxiety state 2022    Atrophic vaginitis 2022    Diarrhea 2022    Diverticular disease of colon 2022    Fatty liver 2022    Hemorrhoids without complication     Leukorrhea 2022    Polyp of transverse colon 2022    Sleep apnea 2022    B12 deficiency 2022    Diabetes 1.5, managed as type 2 ([address] 2022    Vitamin D deficiency 2022    Microcytic anemia 2021    Iron deficiency anemia due to chronic blood loss 2021    Severe obesity (BMI 35.0-39. 9) with comorbidity ([address] 2019    Renal artery stenosis ([address] 2019    HTN (hypertension) 2018    DM (diabetes mellitus) ([address] 2018    Atrial fibrillation with RVR ([address] 2018    History of abdominal hysterectomy 2017    Family history of colon cancer requiring screening colonoscopy 2014    Mixed hyperlipidemia 2012    Uncontrolled type 2 diabetes mellitus with hyperglycemia ([address] 2012    Abscess of back 10/05/2012     Past Medical History:   Diagnosis Date    Anemia     Atrial fibrillation (HCC)     Breast calcifications     Left breast     Colon polyps     Diabetes ([address]     Fatty liver     Fibromuscular dysplasia of renal artery (HCC)     S/P Renal artery stent in     Hypertension     Menopause     Sleep apnea     SVT (supraventricular tachycardia) [facility])       Past Surgical History:   Procedure Laterality Date    COLONOSCOPY N/A 2019    COLONOSCOPY performed by Sundar Morelos MD at [address] [place] [address] Drive ENDOSCOPY    ENDOSCOPY, COLON, DIAGNOSTIC      HX APPENDECTOMY      HX BREAST BIOPSY Left 9/3/2010    Left breast    HX BREAST BIOPSY      Liver    HX CHOLECYSTECTOMY      HX COLONOSCOPY  2014    HX HYSTERECTOMY      HX TONSILLECTOMY        Social History     Tobacco Use    Smoking status: Former     Packs/day: 0.50     Years: 5.00     Pack years: 2.50     Types: Cigarettes     Quit date:      Years since quittin.9    Smokeless tobacco: Never   Substance Use Topics    Alcohol use: No      Social History     Tobacco Use   Smoking Status Former    Packs/day: 0.50    Years: 5.00    Pack years: 2.50    Types: Cigarettes    Quit date:     Years since quittin.9   Smokeless Tobacco Never     Family History   Problem Relation Age of Onset    Cancer Sister 71        breast cancer    Breast Cancer Sister 79    Hypertension Mother     Stroke Mother 59    Diabetes Other         grandmother    Colon Cancer Father 67    Breast Cancer Niece 36        38s      Current Outpatient Medications   Medication Sig    doxycycline (MONODOX) 100 mg capsule Take 100 mg by mouth two (2) times a day. ferrous sulfate 325 mg (65 mg iron) tablet Take  by mouth Daily (before breakfast). FreeStyle Lite Strips strip     cyanocobalamin (VITAMIN B12) 1,000 mcg/mL injection inject 1 milliliter ( 1000 MCG ) subcutaneously every week for 4 ...  (REFER TO PRESCRIPTION NOTES). Xin Pen Needle 32 gauge x \" ndle     ergocalciferol (ERGOCALCIFEROL) 1,250 mcg (50,000 unit) capsule     digoxin (LANOXIN) 0.25 mg tablet Take 0.125 mg by mouth daily. losartan (COZAAR) 25 mg tablet Take 25 mg by mouth daily.     metFORMIN (GLUCOPHAGE) 1,000 mg tablet metformin 1,000 mg tablet   bid    glimepiride (AMARYL) 4 mg tablet glimepiride 4 mg tablet LORazepam (ATIVAN) 0.5 mg tablet Take 1 Tab by mouth three (3) times daily as needed. Max Daily Amount: 1.5 mg.    apixaban (ELIQUIS) 5 mg tablet Take 1 Tab by mouth two (2) times a day. insulin glargine (LANTUS) 100 unit/mL injection Take 24 Units daily    amLODIPine (NORVASC) 5 mg tablet Take 10 mg by mouth daily. cloNIDine HCl (CATAPRES) 0.2 mg tablet Take 0.2 mg by mouth Before breakfast, lunch, and dinner. atorvastatin (LIPITOR) 20 mg tablet Take  by mouth daily. aspirin 81 mg tablet Take 81 mg by mouth.      metoprolol tartrate 75 mg tab Take 75 mg by mouth every twelve (12) hours. (Patient taking differently: Take 100 mg by mouth every twelve (12) hours.)     No current facility-administered medications for this visit. Allergies   Allergen Reactions    Contrast Dye [Iodine] Anaphylaxis    Penicillins Hives    Ciprofloxacin Hives    Codeine Other (comments)     States not allergic      Flecainide Itching    Sulfa (Sulfonamide Antibiotics) Hives          Review of Systems:  Pertinent items are noted in the History of Present Illness. Objective:     Visit Vitals  BP (!) 147/67 (BP 1 Location: Right upper arm, BP Patient Position: Sitting, BP Cuff Size: Large adult)   Pulse 78   Ht 5' 4\" (1.626 m)   Wt 92.1 kg (203 lb)   SpO2 95%   BMI 34.84 kg/m²       Physical Exam:      General:  in no apparent distress, alert, oriented times 3, and afebrile   Back:  Is a sebaceous cyst that is about 3 cm round that is nontender and there is no overlying erythema with no fluctuation or induration.                                 Imaging and Lab Review:     CBC:   Lab Results   Component Value Date/Time    WBC 11.9 08/24/2022 09:51 AM    RBC 5.23 (H) 08/24/2022 09:51 AM    HGB 14.1 08/24/2022 09:51 AM    HCT 44.7 08/24/2022 09:51 AM    PLATELET 017 90/08/0557 09:51 AM     BMP:   Lab Results   Component Value Date/Time    Glucose 198 (H) 08/24/2022 09:51 AM    Sodium 141 08/24/2022 09:51 AM    Potassium 3.5 08/24/2022 09:51 AM    Chloride 100 08/24/2022 09:51 AM    CO2 33 (H) 08/24/2022 09:51 AM    BUN 13 08/24/2022 09:51 AM    Creatinine 0.76 08/24/2022 09:51 AM    Calcium 8.7 08/24/2022 09:51 AM     CMP:  Lab Results   Component Value Date/Time    Glucose 198 (H) 08/24/2022 09:51 AM    Sodium 141 08/24/2022 09:51 AM    Potassium 3.5 08/24/2022 09:51 AM    Chloride 100 08/24/2022 09:51 AM    CO2 33 (H) 08/24/2022 09:51 AM    BUN 13 08/24/2022 09:51 AM    Creatinine 0.76 08/24/2022 09:51 AM    Calcium 8.7 08/24/2022 09:51 AM    Anion gap 8 08/24/2022 09:51 AM    BUN/Creatinine ratio 17 08/24/2022 09:51 AM    Alk. phosphatase 109 08/24/2022 09:51 AM    Protein, total 7.6 08/24/2022 09:51 AM    Albumin 3.8 08/24/2022 09:51 AM    Globulin 3.8 08/24/2022 09:51 AM    A-G Ratio 1.0 08/24/2022 09:51 AM       No results found for this or any previous visit (from the past 24 hour(s)). images and reports reviewed    Assessment:   Toma Deleon is a 76 y.o. female who has an infected sebaceous cyst that was treated well with antibiotics. The cyst is still relatively large measuring about 3 cm or so and it is nontender with no sign of infections so I do not believe that the patient will need any further antibiotics but she will follow-up with me in 2 weeks to make sure that she is doing well.      Plan:     Follow-up in 2 weeks    Please call me if you have any questions (cell phone: [phone])     Signed By: Teddy Irwin MD     November 22, 2022

## 2022-12-06 ENCOUNTER — OFFICE VISIT (OUTPATIENT)
Dept: SURGERY | Age: 77
End: 2022-12-06
Payer: MEDICARE

## 2022-12-06 VITALS
TEMPERATURE: 96.9 F | HEIGHT: 64 IN | HEART RATE: 60 BPM | SYSTOLIC BLOOD PRESSURE: 156 MMHG | WEIGHT: 205 LBS | BODY MASS INDEX: 35 KG/M2 | OXYGEN SATURATION: 98 % | DIASTOLIC BLOOD PRESSURE: 73 MMHG

## 2022-12-06 DIAGNOSIS — L72.3 INFECTED SEBACEOUS CYST: Primary | ICD-10-CM

## 2022-12-06 DIAGNOSIS — L08.9 INFECTED SEBACEOUS CYST: Primary | ICD-10-CM

## 2022-12-06 PROCEDURE — 1090F PRES/ABSN URINE INCON ASSESS: CPT | Performed by: SURGERY

## 2022-12-06 PROCEDURE — 1123F ACP DISCUSS/DSCN MKR DOCD: CPT | Performed by: SURGERY

## 2022-12-06 PROCEDURE — G8417 CALC BMI ABV UP PARAM F/U: HCPCS | Performed by: SURGERY

## 2022-12-06 PROCEDURE — 3074F SYST BP LT 130 MM HG: CPT | Performed by: SURGERY

## 2022-12-06 PROCEDURE — 3078F DIAST BP <80 MM HG: CPT | Performed by: SURGERY

## 2022-12-06 PROCEDURE — G8427 DOCREV CUR MEDS BY ELIG CLIN: HCPCS | Performed by: SURGERY

## 2022-12-06 PROCEDURE — G8753 SYS BP > OR = 140: HCPCS | Performed by: SURGERY

## 2022-12-06 PROCEDURE — 1101F PT FALLS ASSESS-DOCD LE1/YR: CPT | Performed by: SURGERY

## 2022-12-06 PROCEDURE — G8432 DEP SCR NOT DOC, RNG: HCPCS | Performed by: SURGERY

## 2022-12-06 PROCEDURE — 99212 OFFICE O/P EST SF 10 MIN: CPT | Performed by: SURGERY

## 2022-12-06 PROCEDURE — G8754 DIAS BP LESS 90: HCPCS | Performed by: SURGERY

## 2022-12-06 PROCEDURE — G8536 NO DOC ELDER MAL SCRN: HCPCS | Performed by: SURGERY

## 2022-12-06 PROCEDURE — G8399 PT W/DXA RESULTS DOCUMENT: HCPCS | Performed by: SURGERY

## 2022-12-06 NOTE — PROGRESS NOTES
Rena Pappas is a 76 y.o. female (: 1945) presenting to address:    Chief Complaint   Patient presents with    Follow-up     2 week follow up, infected sebaceous cyst on back        Medication list and allergies have been reviewed with Rena Pappas and updated as of today's date. I have gone over all Medical, Surgical and Social History with Rena Pappas and updated/added the information accordingly. 1. Have you been to the ER, Urgent Care or Hospitalized since your last visit? NO      2. Have you followed up with your PCP or any other Physicians since your procedure/ last office visit?    NO

## 2022-12-06 NOTE — PROGRESS NOTES
Patient seen and examined. She is doing well. She still feels the cyst but there is no drainage and there is no pain. On examination there is a cyst measuring about 4 to 5 cm in size that is soft and nontender consistent with a sebaceous cyst that is noninfected. I gave the patient the option of observation versus surgical excision and she would like to observe it and she will follow-up with me in 3 to 6 months or earlier if needed.

## 2023-02-24 ENCOUNTER — TRANSCRIBE ORDERS (OUTPATIENT)
Facility: HOSPITAL | Age: 78
End: 2023-02-24

## 2023-02-24 DIAGNOSIS — Z12.31 VISIT FOR SCREENING MAMMOGRAM: Primary | ICD-10-CM

## 2023-03-07 ENCOUNTER — OFFICE VISIT (OUTPATIENT)
Age: 78
End: 2023-03-07
Payer: MEDICARE

## 2023-03-07 VITALS
SYSTOLIC BLOOD PRESSURE: 158 MMHG | HEART RATE: 85 BPM | TEMPERATURE: 97 F | BODY MASS INDEX: 35.51 KG/M2 | WEIGHT: 208 LBS | HEIGHT: 64 IN | OXYGEN SATURATION: 97 % | DIASTOLIC BLOOD PRESSURE: 82 MMHG

## 2023-03-07 DIAGNOSIS — L72.3 SEBACEOUS CYST: Primary | ICD-10-CM

## 2023-03-07 PROCEDURE — G8428 CUR MEDS NOT DOCUMENT: HCPCS | Performed by: SURGERY

## 2023-03-07 PROCEDURE — 3079F DIAST BP 80-89 MM HG: CPT | Performed by: SURGERY

## 2023-03-07 PROCEDURE — 1090F PRES/ABSN URINE INCON ASSESS: CPT | Performed by: SURGERY

## 2023-03-07 PROCEDURE — 4004F PT TOBACCO SCREEN RCVD TLK: CPT | Performed by: SURGERY

## 2023-03-07 PROCEDURE — G8419 CALC BMI OUT NRM PARAM NOF/U: HCPCS | Performed by: SURGERY

## 2023-03-07 PROCEDURE — 3077F SYST BP >= 140 MM HG: CPT | Performed by: SURGERY

## 2023-03-07 PROCEDURE — 1123F ACP DISCUSS/DSCN MKR DOCD: CPT | Performed by: SURGERY

## 2023-03-07 PROCEDURE — G8400 PT W/DXA NO RESULTS DOC: HCPCS | Performed by: SURGERY

## 2023-03-07 PROCEDURE — 99213 OFFICE O/P EST LOW 20 MIN: CPT | Performed by: SURGERY

## 2023-03-07 PROCEDURE — G8484 FLU IMMUNIZE NO ADMIN: HCPCS | Performed by: SURGERY

## 2023-03-07 RX ORDER — DOXYCYCLINE HYCLATE 100 MG
100 TABLET ORAL 2 TIMES DAILY
Qty: 10 TABLET | Refills: 0 | Status: SHIPPED | OUTPATIENT
Start: 2023-03-07 | End: 2023-03-12

## 2023-03-07 NOTE — PROGRESS NOTES
Bhakti Wing is a [de-identified] y.o. female (: 1945) presenting to address:    Chief Complaint   Patient presents with    Follow-up     3 month follow up for sebaceous cyst on back       Medication list and allergies have been reviewed with Bhakti Wing and updated as of today's date. I have gone over all Medical, Surgical and Social History with Bhakti Wing and updated/added the information accordingly. 1. Have you been to the ER, Urgent Care or Hospitalized since your last visit? No          2. Have you followed up with your PCP or any other Physicians since your procedure/ last office visit?    No

## 2023-03-07 NOTE — PROGRESS NOTES
General Surgery Consult    Elige Sacks  Admit date: (Not on file)    MRN: [id number]     : 1945     Age: [de-identified] y.o. Attending Physician: Julio C Reyez MD, [facility]      History of Present Illness:      Elige Sacks is a [de-identified] y.o. female who I have seen last year for evaluation of a sebaceous cyst on the back that we decided to observe. The patient has been doing well however recently she noticed some discharge from the cyst and she just wanted make sure that everything is okay. She said that there was more discharge before and now it has slowed down and she feels better. She denies any fever or chills . Patient Active Problem List    Diagnosis Date Noted    Abnormal liver function tests 2022    Anxiety state 2022    Atrophic vaginitis 2022    Diarrhea 2022    Diverticular disease of colon 2022    Fatty liver 2022    Hemorrhoids without complication     Leukorrhea 2022    Polyp of transverse colon 2022    Sleep apnea 2022    B12 deficiency 2022    Vitamin D deficiency 2022    Diabetes 1.5, managed as type 2 ([address] 2022    Microcytic anemia 2021    Iron deficiency anemia due to chronic blood loss 2021    Severe obesity (BMI 35.0-39. 9) with comorbidity ([address] 2019    Renal artery stenosis ([address] 2019    DM (diabetes mellitus) ([address] 2018    Atrial fibrillation with RVR ([address] 2018    HTN (hypertension) 2018    History of abdominal hysterectomy 2017    Family history of colon cancer requiring screening colonoscopy 2014    Mixed hyperlipidemia 2012    Uncontrolled type 2 diabetes mellitus with hyperglycemia ([address] 2012    Abscess of back 10/05/2012     Past Medical History:   Diagnosis Date    Anemia     Atrial fibrillation (HCC)     Breast calcifications     Left breast     Colon polyps     Diabetes ([address]     Fatty liver     Fibromuscular dysplasia of renal artery (HCC)     S/P Renal artery stent in     Hypertension     Menopause     Sleep apnea     SVT (supraventricular tachycardia) [facility]       Past Surgical History:   Procedure Laterality Date    APPENDECTOMY      BREAST BIOPSY Left 9/3/2010    Left breast    BREAST BIOPSY      Liver    CHOLECYSTECTOMY      COLONOSCOPY      COLONOSCOPY  2014    COLONOSCOPY N/A 2019    COLONOSCOPY performed by Charlie Dangelo MD at [address] [place] [address] (CERVIX STATUS UNKNOWN)      TONSILLECTOMY        Social History     Tobacco Use    Smoking status: Former     Packs/day: 0.50     Types: Cigarettes     Quit date: 1980     Years since quittin.2    Smokeless tobacco: Never   Substance Use Topics    Alcohol use: No      Social History     Tobacco Use   Smoking Status Former    Packs/day: 0.50    Types: Cigarettes    Quit date: 1980    Years since quittin.2   Smokeless Tobacco Never     Family History   Problem Relation Age of Onset    Colon Cancer Father 67    Cancer Sister 71        breast cancer    Breast Cancer Sister 79    Breast Cancer Niece 39        45s    Hypertension Mother     Stroke Mother 59    Diabetes Other         grandmother      Current Outpatient Medications   Medication Sig    doxycycline hyclate (VIBRA-TABS) 100 MG tablet Take 1 tablet by mouth 2 times daily for 5 days    amLODIPine (NORVASC) 5 MG tablet Take 10 mg by mouth daily    apixaban (ELIQUIS) 5 MG TABS tablet Take 5 mg by mouth 2 times daily    atorvastatin (LIPITOR) 20 MG tablet Take by mouth daily    cloNIDine (CATAPRES) 0.2 MG tablet Take 0.2 mg by mouth 3 times daily (before meals)    cyanocobalamin 1000 MCG/ML injection inject 1 milliliter ( 1000 MCG ) subcutaneously every week for 4 ...  (REFER TO PRESCRIPTION NOTES).     digoxin (LANOXIN) 250 MCG tablet Take 0.125 mg by mouth daily    doxycycline monohydrate (MONODOX) 100 MG capsule Take 100 mg by mouth 2 times daily    ergocalciferol (ERGOCALCIFEROL) 1.25 MG (77945 UT) capsule ceived the following from [facility]: Outside name: ergocalciferol (ERGOCALCIFEROL) 1,250 mcg (50,000 unit) capsule    ferrous sulfate (IRON 325) 325 (65 Fe) MG tablet Take by mouth every morning (before breakfast)    glimepiride (AMARYL) 4 MG tablet glimepiride 4 mg tablet    insulin glargine (LANTUS) 100 UNIT/ML injection vial Take 24 Units daily    LORazepam (ATIVAN) 0.5 MG tablet Take 0.5 mg by mouth 3 times daily as needed. losartan (COZAAR) 25 MG tablet Take 25 mg by mouth daily    metFORMIN (GLUCOPHAGE) 1000 MG tablet metformin 1,000 mg tablet   bid    Metoprolol Tartrate 75 MG TABS Take 75 mg by mouth every 12 hours     No current facility-administered medications for this visit. Allergies   Allergen Reactions    Iodine Anaphylaxis    Penicillins Hives    Ciprofloxacin Hives    Codeine Other (See Comments)     States not allergic    Flecainide Itching    Sulfa Antibiotics Hives          Review of Systems:  Pertinent items are noted in the History of Present Illness. Objective:     BP (!) 158/82 (Site: Left Upper Arm, Position: Sitting, Cuff Size: Large Adult)   Pulse 85   Temp 97 °F (36.1 °C)   Ht 5' 4\" (1.626 m)   Wt 208 lb (94.3 kg)   SpO2 97%   BMI 35.70 kg/m²     Physical Exam:      General:  in no apparent distress, alert, and oriented times 3   Back:  Sebaceous cyst on the right lower back measuring around 1 to 2 cm. There is no clear erythema but there is a small opening with very minimal drainage. It is nontender.                                Imaging and Lab Review:     CBC:   Lab Results   Component Value Date/Time    WBC 11.9 08/24/2022 09:51 AM    RBC 5.23 08/24/2022 09:51 AM    HGB 14.1 08/24/2022 09:51 AM    HCT 44.7 08/24/2022 09:51 AM     08/24/2022 09:51 AM     BMP:   Lab Results   Component Value Date/Time     08/24/2022 09:51 AM    K 3.5 08/24/2022 09:51 AM     08/24/2022 09:51 AM    CO2 33 08/24/2022 09:51 AM    BUN 13 08/24/2022 09:51 AM     CMP:  Lab Results   Component Value Date/Time     08/24/2022 09:51 AM    K 3.5 08/24/2022 09:51 AM     08/24/2022 09:51 AM    CO2 33 08/24/2022 09:51 AM    BUN 13 08/24/2022 09:51 AM    GLOB 3.8 08/24/2022 09:51 AM       No results found for this or any previous visit (from the past 24 hour(s)). images and reports reviewed    Assessment:   Jyoti Maxwell is a [de-identified] y.o. female with a sebaceous cyst that has recently been infected and seems to be doing well with spontaneous drainage. Because there is some minimal drainage still I would like to prescribe her antibiotics and she will follow-up with me in 1 week.      Plan:     Doxycycline twice daily for 5 days  Follow-up with me next week    Please call me if you have any questions (cell phone: [phone])     Signed By: Dotty Moise MD     March 7, 2023

## 2023-03-14 ENCOUNTER — OFFICE VISIT (OUTPATIENT)
Age: 78
End: 2023-03-14
Payer: MEDICARE

## 2023-03-14 VITALS
HEIGHT: 64 IN | DIASTOLIC BLOOD PRESSURE: 71 MMHG | HEART RATE: 65 BPM | SYSTOLIC BLOOD PRESSURE: 143 MMHG | BODY MASS INDEX: 34.83 KG/M2 | OXYGEN SATURATION: 96 % | TEMPERATURE: 96 F | WEIGHT: 204 LBS

## 2023-03-14 DIAGNOSIS — L72.3 SEBACEOUS CYST: Primary | ICD-10-CM

## 2023-03-14 PROCEDURE — 99213 OFFICE O/P EST LOW 20 MIN: CPT | Performed by: SURGERY

## 2023-03-14 PROCEDURE — G8484 FLU IMMUNIZE NO ADMIN: HCPCS | Performed by: SURGERY

## 2023-03-14 PROCEDURE — G8417 CALC BMI ABV UP PARAM F/U: HCPCS | Performed by: SURGERY

## 2023-03-14 PROCEDURE — 3077F SYST BP >= 140 MM HG: CPT | Performed by: SURGERY

## 2023-03-14 PROCEDURE — 1090F PRES/ABSN URINE INCON ASSESS: CPT | Performed by: SURGERY

## 2023-03-14 PROCEDURE — 1123F ACP DISCUSS/DSCN MKR DOCD: CPT | Performed by: SURGERY

## 2023-03-14 PROCEDURE — 4004F PT TOBACCO SCREEN RCVD TLK: CPT | Performed by: SURGERY

## 2023-03-14 PROCEDURE — G8428 CUR MEDS NOT DOCUMENT: HCPCS | Performed by: SURGERY

## 2023-03-14 PROCEDURE — 3078F DIAST BP <80 MM HG: CPT | Performed by: SURGERY

## 2023-03-14 PROCEDURE — G8400 PT W/DXA NO RESULTS DOC: HCPCS | Performed by: SURGERY

## 2023-03-14 NOTE — PROGRESS NOTES
Stefany Bolanos is a [de-identified] y.o. female (: 1945) presenting to address:    Chief Complaint   Patient presents with    Follow-up     1 week  follow up for sebaceous cyst of mid right back       Medication list and allergies have been reviewed with Stefany Bolanos and updated as of today's date. I have gone over all Medical, Surgical and Social History with Stefany Bolanos and updated/added the information accordingly. 1. Have you been to the ER, Urgent Care or Hospitalized since your last visit? No          2. Have you followed up with your PCP or any other Physicians since your procedure/ last office visit?    No

## 2023-03-14 NOTE — PROGRESS NOTES
General Surgery Consult    Brittnee Villarreal  Admit date: (Not on file)    MRN: [id number]     : 1945     Age: [de-identified] y.o. Attending Physician: Aline Egan MD [facility]      History of Present Illness:      Brittnee Villarreal is a [de-identified] y.o. female who is here for follow-up on her sebaceous cyst on the back. The patient feels much better and that she has no pain or drainage and she still have 2 more days of the doxycycline. Patient Active Problem List    Diagnosis Date Noted    Abnormal liver function tests 2022    Anxiety state 2022    Atrophic vaginitis 2022    Diarrhea 2022    Diverticular disease of colon 2022    Fatty liver 2022    Hemorrhoids without complication     Leukorrhea 2022    Polyp of transverse colon 2022    Sleep apnea 2022    B12 deficiency 2022    Vitamin D deficiency 2022    Diabetes 1.5, managed as type 2 ([address] 2022    Microcytic anemia 2021    Iron deficiency anemia due to chronic blood loss 2021    Severe obesity (BMI 35.0-39. 9) with comorbidity ([address] 2019    Renal artery stenosis ([address] 2019    DM (diabetes mellitus) ([address] 2018    Atrial fibrillation with RVR ([address] 2018    HTN (hypertension) 2018    History of abdominal hysterectomy 2017    Family history of colon cancer requiring screening colonoscopy 2014    Mixed hyperlipidemia 2012    Uncontrolled type 2 diabetes mellitus with hyperglycemia ([address] 2012    Abscess of back 10/05/2012     Past Medical History:   Diagnosis Date    Anemia     Atrial fibrillation (HCC)     Breast calcifications     Left breast     Colon polyps     Diabetes ([address]     Fatty liver     Fibromuscular dysplasia of renal artery (HCC)     S/P Renal artery stent in     Hypertension     Menopause     Sleep apnea     SVT (supraventricular tachycardia) ([address]       Past Surgical History:   Procedure Laterality Date APPENDECTOMY      BREAST BIOPSY Left 9/3/2010    Left breast    BREAST BIOPSY      Liver    CHOLECYSTECTOMY      COLONOSCOPY      COLONOSCOPY  2014    COLONOSCOPY N/A 2019    COLONOSCOPY performed by Isadora Colon MD at [facility] ENDOSCOPY    HYSTERECTOMY (CERVIX STATUS UNKNOWN)      TONSILLECTOMY        Social History     Tobacco Use    Smoking status: Former     Packs/day: 0.50     Types: Cigarettes     Quit date: 1980     Years since quittin.2    Smokeless tobacco: Never   Substance Use Topics    Alcohol use: No      Social History     Tobacco Use   Smoking Status Former    Packs/day: 0.50    Types: Cigarettes    Quit date: 1980    Years since quittin.2   Smokeless Tobacco Never     Family History   Problem Relation Age of Onset    Colon Cancer Father 67    Cancer Sister 71        breast cancer    Breast Cancer Sister 79    Breast Cancer Niece 39        45s    Hypertension Mother     Stroke Mother 59    Diabetes Other         grandmother      Current Outpatient Medications   Medication Sig    amLODIPine (NORVASC) 5 MG tablet Take 10 mg by mouth daily    apixaban (ELIQUIS) 5 MG TABS tablet Take 5 mg by mouth 2 times daily    atorvastatin (LIPITOR) 20 MG tablet Take by mouth daily    cloNIDine (CATAPRES) 0.2 MG tablet Take 0.2 mg by mouth 3 times daily (before meals)    cyanocobalamin 1000 MCG/ML injection inject 1 milliliter ( 1000 MCG ) subcutaneously every week for 4 ...  (REFER TO PRESCRIPTION NOTES).     digoxin (LANOXIN) 250 MCG tablet Take 0.125 mg by mouth daily    doxycycline monohydrate (MONODOX) 100 MG capsule Take 100 mg by mouth 2 times daily    ergocalciferol (ERGOCALCIFEROL) 1.25 MG (66873 UT) capsule ceived the following from [facility]: Outside name: ergocalciferol (ERGOCALCIFEROL) 1,250 mcg (50,000 unit) capsule    ferrous sulfate (IRON 325) 325 (65 Fe) MG tablet Take by mouth every morning (before breakfast)    glimepiride (AMARYL) 4 MG tablet glimepiride 4 mg tablet    insulin glargine (LANTUS) 100 UNIT/ML injection vial Take 24 Units daily    LORazepam (ATIVAN) 0.5 MG tablet Take 0.5 mg by mouth 3 times daily as needed. losartan (COZAAR) 25 MG tablet Take 25 mg by mouth daily    metFORMIN (GLUCOPHAGE) 1000 MG tablet metformin 1,000 mg tablet   bid    Metoprolol Tartrate 75 MG TABS Take 75 mg by mouth every 12 hours     No current facility-administered medications for this visit. Allergies   Allergen Reactions    Iodine Anaphylaxis    Penicillins Hives    Ciprofloxacin Hives    Codeine Other (See Comments)     States not allergic    Flecainide Itching    Sulfa Antibiotics Hives          Review of Systems:  Pertinent items are noted in the History of Present Illness. Objective:     BP (!) 143/71 (Site: Right Upper Arm, Position: Sitting, Cuff Size: Large Adult)   Pulse 65   Temp (!) 96 °F (35.6 °C)   Ht 5' 4\" (1.626 m)   Wt 204 lb (92.5 kg)   SpO2 96%   BMI 35.02 kg/m²     Physical Exam:      General:  in no apparent distress, alert, and afebrile   Back:  Area of the sebaceous cyst has healed very well and that there is no induration or fluctuation and there is no erythema. There is a chronic scarring from the cyst from before but no evidence of any infection.                                Imaging and Lab Review:     CBC:   Lab Results   Component Value Date/Time    WBC 11.9 08/24/2022 09:51 AM    RBC 5.23 08/24/2022 09:51 AM    HGB 14.1 08/24/2022 09:51 AM    HCT 44.7 08/24/2022 09:51 AM     08/24/2022 09:51 AM     BMP:   Lab Results   Component Value Date/Time     08/24/2022 09:51 AM    K 3.5 08/24/2022 09:51 AM     08/24/2022 09:51 AM    CO2 33 08/24/2022 09:51 AM    BUN 13 08/24/2022 09:51 AM     CMP:  Lab Results   Component Value Date/Time     08/24/2022 09:51 AM    K 3.5 08/24/2022 09:51 AM     08/24/2022 09:51 AM    CO2 33 08/24/2022 09:51 AM    BUN 13 08/24/2022 09:51 AM    GLOB 3.8 08/24/2022 09:51 AM       No results found for this or any previous visit (from the past 24 hour(s)). images and reports reviewed    Assessment:   Osbaldo Carey is a [de-identified] y.o. female who has a sebaceous cyst that got infected and is treated very well with the antibiotics and the patient is doing well. No need for any surgical intervention currently. She will follow-up as needed.     Plan:     Veronica Headley the course of doxycycline  Follow-up as needed    Please call me if you have any questions (cell phone: [phone])     Signed By: Lisa Rueda MD     March 14, 2023

## (undated) DEVICE — SYR 50ML SLIP TIP NSAF LF STRL --

## (undated) DEVICE — BASIN EMESIS 500CC ROSE 250/CS 60/PLT: Brand: MEDEGEN MEDICAL PRODUCTS, LLC

## (undated) DEVICE — WORKING LENGTH 235CM, WORKING CHANNEL 2.8MM: Brand: RESOLUTION 360 CLIP

## (undated) DEVICE — TUBING, SUCTION, 3/16" X 6', STRAIGHT: Brand: MEDLINE

## (undated) DEVICE — FLEX ADVANTAGE 1500CC: Brand: FLEX ADVANTAGE

## (undated) DEVICE — CONTAINER PREFIL FRMLN 15ML --

## (undated) DEVICE — KIT COLON W/ 1.1OZ LUB AND 2 END

## (undated) DEVICE — Device: Brand: DEFENDO VALVE AND CONNECTOR KIT

## (undated) DEVICE — TRAP SPEC COLL POLYP POLYSTYR --

## (undated) DEVICE — SNARE ENDO 2.4MMX230CM -- COLD EXACTO